# Patient Record
Sex: FEMALE | Race: ASIAN
[De-identification: names, ages, dates, MRNs, and addresses within clinical notes are randomized per-mention and may not be internally consistent; named-entity substitution may affect disease eponyms.]

---

## 2020-08-16 ENCOUNTER — HOSPITAL ENCOUNTER (INPATIENT)
Dept: HOSPITAL 72 - EMR | Age: 85
LOS: 5 days | Discharge: NURSING, REHAB OR LONG-TERM CARE | DRG: 870 | End: 2020-08-21
Payer: COMMERCIAL

## 2020-08-16 VITALS — SYSTOLIC BLOOD PRESSURE: 85 MMHG | DIASTOLIC BLOOD PRESSURE: 58 MMHG

## 2020-08-16 VITALS — DIASTOLIC BLOOD PRESSURE: 60 MMHG | SYSTOLIC BLOOD PRESSURE: 82 MMHG

## 2020-08-16 VITALS — BODY MASS INDEX: 30.4 KG/M2 | HEIGHT: 61 IN | WEIGHT: 161 LBS

## 2020-08-16 VITALS — SYSTOLIC BLOOD PRESSURE: 90 MMHG | DIASTOLIC BLOOD PRESSURE: 54 MMHG

## 2020-08-16 VITALS — DIASTOLIC BLOOD PRESSURE: 48 MMHG | SYSTOLIC BLOOD PRESSURE: 97 MMHG

## 2020-08-16 VITALS — SYSTOLIC BLOOD PRESSURE: 111 MMHG | DIASTOLIC BLOOD PRESSURE: 90 MMHG

## 2020-08-16 VITALS — SYSTOLIC BLOOD PRESSURE: 70 MMHG | DIASTOLIC BLOOD PRESSURE: 34 MMHG

## 2020-08-16 VITALS — DIASTOLIC BLOOD PRESSURE: 51 MMHG | SYSTOLIC BLOOD PRESSURE: 91 MMHG

## 2020-08-16 DIAGNOSIS — I21.4: ICD-10-CM

## 2020-08-16 DIAGNOSIS — K64.8: ICD-10-CM

## 2020-08-16 DIAGNOSIS — A41.9: Primary | ICD-10-CM

## 2020-08-16 DIAGNOSIS — L89.322: ICD-10-CM

## 2020-08-16 DIAGNOSIS — E87.6: ICD-10-CM

## 2020-08-16 DIAGNOSIS — E87.1: ICD-10-CM

## 2020-08-16 DIAGNOSIS — Z43.1: ICD-10-CM

## 2020-08-16 DIAGNOSIS — L89.126: ICD-10-CM

## 2020-08-16 DIAGNOSIS — Z43.0: ICD-10-CM

## 2020-08-16 DIAGNOSIS — E11.9: ICD-10-CM

## 2020-08-16 DIAGNOSIS — K29.70: ICD-10-CM

## 2020-08-16 DIAGNOSIS — Z79.4: ICD-10-CM

## 2020-08-16 DIAGNOSIS — L89.326: ICD-10-CM

## 2020-08-16 DIAGNOSIS — R40.3: ICD-10-CM

## 2020-08-16 DIAGNOSIS — D64.9: ICD-10-CM

## 2020-08-16 DIAGNOSIS — Z99.11: ICD-10-CM

## 2020-08-16 DIAGNOSIS — I48.91: ICD-10-CM

## 2020-08-16 DIAGNOSIS — I69.198: ICD-10-CM

## 2020-08-16 DIAGNOSIS — E87.70: ICD-10-CM

## 2020-08-16 DIAGNOSIS — L89.526: ICD-10-CM

## 2020-08-16 DIAGNOSIS — R13.10: ICD-10-CM

## 2020-08-16 DIAGNOSIS — N39.0: ICD-10-CM

## 2020-08-16 DIAGNOSIS — N17.9: ICD-10-CM

## 2020-08-16 DIAGNOSIS — J18.9: ICD-10-CM

## 2020-08-16 DIAGNOSIS — L89.896: ICD-10-CM

## 2020-08-16 DIAGNOSIS — K92.2: ICD-10-CM
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
## 2020-08-16 DIAGNOSIS — R65.21: ICD-10-CM

## 2020-08-16 DIAGNOSIS — J96.10: ICD-10-CM

## 2020-08-16 DIAGNOSIS — K57.90: ICD-10-CM

## 2020-08-16 DIAGNOSIS — L89.156: ICD-10-CM

## 2020-08-16 LAB
ADD MANUAL DIFF: YES
ALBUMIN SERPL-MCNC: 1.8 G/DL (ref 3.4–5)
ALBUMIN/GLOB SERPL: 0.6 {RATIO} (ref 1–2.7)
ALP SERPL-CCNC: 87 U/L (ref 46–116)
ALT SERPL-CCNC: 46 U/L (ref 12–78)
ANION GAP SERPL CALC-SCNC: 6 MMOL/L (ref 5–15)
APPEARANCE UR: (no result)
APTT BLD: 25 SEC (ref 23–33)
APTT PPP: YELLOW S
AST SERPL-CCNC: 62 U/L (ref 15–37)
BILIRUB SERPL-MCNC: 0.5 MG/DL (ref 0.2–1)
BUN SERPL-MCNC: 112 MG/DL (ref 7–18)
CALCIUM SERPL-MCNC: 7.3 MG/DL (ref 8.5–10.1)
CHLORIDE SERPL-SCNC: 85 MMOL/L (ref 98–107)
CO2 SERPL-SCNC: 34 MMOL/L (ref 21–32)
CREAT SERPL-MCNC: 1.8 MG/DL (ref 0.55–1.3)
ERYTHROCYTE [DISTWIDTH] IN BLOOD BY AUTOMATED COUNT: 12.2 % (ref 11.6–14.8)
GLOBULIN SER-MCNC: 3 G/DL
GLUCOSE UR STRIP-MCNC: NEGATIVE MG/DL
HCT VFR BLD CALC: 18.3 % (ref 37–47)
HGB BLD-MCNC: 6 G/DL (ref 12–16)
INR PPP: 0.9 (ref 0.9–1.1)
KETONES UR QL STRIP: NEGATIVE
LEUKOCYTE ESTERASE UR QL STRIP: (no result)
MCV RBC AUTO: 91 FL (ref 80–99)
NITRITE UR QL STRIP: NEGATIVE
PH UR STRIP: 5 [PH] (ref 4.5–8)
PLATELET # BLD: 153 K/UL (ref 150–450)
POTASSIUM SERPL-SCNC: 3.7 MMOL/L (ref 3.5–5.1)
PROT UR QL STRIP: NEGATIVE
RBC # BLD AUTO: 2.01 M/UL (ref 4.2–5.4)
SODIUM SERPL-SCNC: 125 MMOL/L (ref 136–145)
SP GR UR STRIP: 1.01 (ref 1–1.03)
UROBILINOGEN UR-MCNC: NORMAL MG/DL (ref 0–1)
WBC # BLD AUTO: 9.5 K/UL (ref 4.8–10.8)

## 2020-08-16 PROCEDURE — 84443 ASSAY THYROID STIM HORMONE: CPT

## 2020-08-16 PROCEDURE — 83880 ASSAY OF NATRIURETIC PEPTIDE: CPT

## 2020-08-16 PROCEDURE — 86140 C-REACTIVE PROTEIN: CPT

## 2020-08-16 PROCEDURE — 85730 THROMBOPLASTIN TIME PARTIAL: CPT

## 2020-08-16 PROCEDURE — 86900 BLOOD TYPING SEROLOGIC ABO: CPT

## 2020-08-16 PROCEDURE — 80048 BASIC METABOLIC PNL TOTAL CA: CPT

## 2020-08-16 PROCEDURE — 84100 ASSAY OF PHOSPHORUS: CPT

## 2020-08-16 PROCEDURE — 94002 VENT MGMT INPAT INIT DAY: CPT

## 2020-08-16 PROCEDURE — 82533 TOTAL CORTISOL: CPT

## 2020-08-16 PROCEDURE — 83550 IRON BINDING TEST: CPT

## 2020-08-16 PROCEDURE — 83615 LACTATE (LD) (LDH) ENZYME: CPT

## 2020-08-16 PROCEDURE — 93970 EXTREMITY STUDY: CPT

## 2020-08-16 PROCEDURE — 82607 VITAMIN B-12: CPT

## 2020-08-16 PROCEDURE — 83735 ASSAY OF MAGNESIUM: CPT

## 2020-08-16 PROCEDURE — 74177 CT ABD & PELVIS W/CONTRAST: CPT

## 2020-08-16 PROCEDURE — 94003 VENT MGMT INPAT SUBQ DAY: CPT

## 2020-08-16 PROCEDURE — 81003 URINALYSIS AUTO W/O SCOPE: CPT

## 2020-08-16 PROCEDURE — 5A1955Z RESPIRATORY VENTILATION, GREATER THAN 96 CONSECUTIVE HOURS: ICD-10-PCS

## 2020-08-16 PROCEDURE — 85044 MANUAL RETICULOCYTE COUNT: CPT

## 2020-08-16 PROCEDURE — 30233N1 TRANSFUSION OF NONAUTOLOGOUS RED BLOOD CELLS INTO PERIPHERAL VEIN, PERCUTANEOUS APPROACH: ICD-10-PCS

## 2020-08-16 PROCEDURE — 86901 BLOOD TYPING SEROLOGIC RH(D): CPT

## 2020-08-16 PROCEDURE — 93005 ELECTROCARDIOGRAM TRACING: CPT

## 2020-08-16 PROCEDURE — 85025 COMPLETE CBC W/AUTO DIFF WBC: CPT

## 2020-08-16 PROCEDURE — 86850 RBC ANTIBODY SCREEN: CPT

## 2020-08-16 PROCEDURE — 80053 COMPREHEN METABOLIC PANEL: CPT

## 2020-08-16 PROCEDURE — 82728 ASSAY OF FERRITIN: CPT

## 2020-08-16 PROCEDURE — 94150 VITAL CAPACITY TEST: CPT

## 2020-08-16 PROCEDURE — 94664 DEMO&/EVAL PT USE INHALER: CPT

## 2020-08-16 PROCEDURE — 87081 CULTURE SCREEN ONLY: CPT

## 2020-08-16 PROCEDURE — 82270 OCCULT BLOOD FECES: CPT

## 2020-08-16 PROCEDURE — 87040 BLOOD CULTURE FOR BACTERIA: CPT

## 2020-08-16 PROCEDURE — 84550 ASSAY OF BLOOD/URIC ACID: CPT

## 2020-08-16 PROCEDURE — 99291 CRITICAL CARE FIRST HOUR: CPT

## 2020-08-16 PROCEDURE — 36415 COLL VENOUS BLD VENIPUNCTURE: CPT

## 2020-08-16 PROCEDURE — 85610 PROTHROMBIN TIME: CPT

## 2020-08-16 PROCEDURE — 87070 CULTURE OTHR SPECIMN AEROBIC: CPT

## 2020-08-16 PROCEDURE — 87324 CLOSTRIDIUM AG IA: CPT

## 2020-08-16 PROCEDURE — 87086 URINE CULTURE/COLONY COUNT: CPT

## 2020-08-16 PROCEDURE — 96361 HYDRATE IV INFUSION ADD-ON: CPT

## 2020-08-16 PROCEDURE — 83540 ASSAY OF IRON: CPT

## 2020-08-16 PROCEDURE — 83605 ASSAY OF LACTIC ACID: CPT

## 2020-08-16 PROCEDURE — 87181 SC STD AGAR DILUTION PER AGT: CPT

## 2020-08-16 PROCEDURE — 82746 ASSAY OF FOLIC ACID SERUM: CPT

## 2020-08-16 PROCEDURE — 86920 COMPATIBILITY TEST SPIN: CPT

## 2020-08-16 PROCEDURE — 84484 ASSAY OF TROPONIN QUANT: CPT

## 2020-08-16 PROCEDURE — 93306 TTE W/DOPPLER COMPLETE: CPT

## 2020-08-16 PROCEDURE — 85007 BL SMEAR W/DIFF WBC COUNT: CPT

## 2020-08-16 PROCEDURE — 96365 THER/PROPH/DIAG IV INF INIT: CPT

## 2020-08-16 PROCEDURE — 71045 X-RAY EXAM CHEST 1 VIEW: CPT

## 2020-08-16 RX ADMIN — HUMAN INSULIN SCH MLS/HR: 100 INJECTION, SOLUTION SUBCUTANEOUS at 17:24

## 2020-08-16 RX ADMIN — SUCRALFATE SCH GM: 1 TABLET ORAL at 21:40

## 2020-08-16 RX ADMIN — SUCRALFATE SCH GM: 1 TABLET ORAL at 17:23

## 2020-08-16 RX ADMIN — PANTOPRAZOLE SODIUM SCH MG: 40 INJECTION, POWDER, FOR SOLUTION INTRAVENOUS at 21:39

## 2020-08-16 NOTE — NUR
ED Nurse Note:



Pt from [facility] and was brought in by ambulance due to hypotension 
78/48, low hgb of 6.2 and low hct of 18.5. Pt is a trach vent dependent. Noted 
severe edema on bilateral arms. Pt on G tube and with indwelling leahy cath 
upon arrival. Opens eyes spontaneously but non verbal, unable to follow 
commands. No respiratory distress.

## 2020-08-16 NOTE — NUR
ED Nurse Note:

15 min monitoring completed; blood continues to infuse. patient asymptomatic; 
vitals stable to baseline.

## 2020-08-16 NOTE — NUR
Received pt on SIMV rate of 10  Fio2 35% PEEP +5 PS 12. Placed pt on AC 
10  35% +5 per Dr. Guaman. SpO2 100%. Pt has cuffed shiley XLT 8. 
Suction with scant to small thin clear/white secretion with no complications. 
Trach is patent and secure. Cuff pressure checked via MLT. Alarms are on and 
audible. Back up trach at bedside. Will continue to monitor.

## 2020-08-16 NOTE — CARDIAC ELECTROPHYSIOLOGY PN
Subjective


Subjective


[id number]





Objective





Last 24 Hour Vital Signs








  Date Time  Temp Pulse Resp B/P (MAP) Pulse Ox O2 Delivery O2 Flow Rate FiO2


 


8/16/20 15:57  105 16 97/48 (64) 100   


 


8/16/20 15:51 97.7 79 15 70/34 (46) 100   


 


8/16/20 14:56  128 16  100 Mechanical Ventilator  35


 


8/16/20 14:56  128 16     35


 


8/16/20 14:33      Mechanical Ventilator  


 


8/16/20 14:00 97.9 103 18 111/90 (97) 100   


 


8/16/20 13:56 97.5 123 17 90/54 100 Mechanical Ventilator  35


 


8/16/20 13:51 97.5 123 17 90/54 100 Mechanical Ventilator  35


 


8/16/20 13:30 97.9 120 17 85/58 100 Mechanical Ventilator  35


 


8/16/20 13:25  127 16     35


 


8/16/20 11:10  68 24     35


 


8/16/20 10:49 96.4 87 19 82/60 100 Mechanical Ventilator  


 


8/16/20 10:45        35


 


8/16/20 10:39 96.4 59 22 82/60 (67) 99 Trach Collar  











Laboratory Tests








Test


  8/16/20


10:50 8/16/20


12:00 8/16/20


13:30


 


Urine Color Yellow    


 


Urine Appearance


  Slightly


cloudy 


  


 


 


Urine pH 5 (4.5-8.0)    


 


Urine Specific Gravity


  1.010


(1.005-1.035) 


  


 


 


Urine Protein


  Negative


(NEGATIVE) 


  


 


 


Urine Glucose (UA)


  Negative


(NEGATIVE) 


  


 


 


Urine Ketones


  Negative


(NEGATIVE) 


  


 


 


Urine Blood


  3+ (NEGATIVE)


H 


  


 


 


Urine Nitrite


  Negative


(NEGATIVE) 


  


 


 


Urine Bilirubin


  Negative


(NEGATIVE) 


  


 


 


Urine Urobilinogen


  Normal MG/DL


(0.0-1.0) 


  


 


 


Urine Leukocyte Esterase


  2+ (NEGATIVE)


H 


  


 


 


Urine RBC


  15-20 /HPF (0


- 2)  H 


  


 


 


Urine WBC


  20-30 /HPF (0


- 2)  H 


  


 


 


Urine Squamous Epithelial


Cells Moderate /LPF


(NONE/OCC)  H 


  


 


 


Urine Bacteria


  Moderate /HPF


(NONE)  H 


  


 


 


Urine Yeast


  Many /HPF


(NONE)  H 


  


 


 


White Blood Count


  


  9.5 K/UL


(4.8-10.8) 


 


 


Red Blood Count


  


  2.01 M/UL


(4.20-5.40)  L 


 


 


Hemoglobin


  


  6.0 G/DL


(12.0-16.0)  *L 


 


 


Hematocrit


  


  18.3 %


(37.0-47.0)  L 


 


 


Mean Corpuscular Volume  91 FL (80-99)   


 


Mean Corpuscular Hemoglobin


  


  29.8 PG


(27.0-31.0) 


 


 


Mean Corpuscular Hemoglobin


Concent 


  32.7 G/DL


(32.0-36.0) 


 


 


Red Cell Distribution Width


  


  12.2 %


(11.6-14.8) 


 


 


Platelet Count


  


  153 K/UL


(150-450) 


 


 


Mean Platelet Volume


  


  9.3 FL


(6.5-10.1) 


 


 


Neutrophils (%) (Auto)


  


  % (45.0-75.0)


  


 


 


Lymphocytes (%) (Auto)


  


  % (20.0-45.0)


  


 


 


Monocytes (%) (Auto)   % (1.0-10.0)   


 


Eosinophils (%) (Auto)   % (0.0-3.0)   


 


Basophils (%) (Auto)   % (0.0-2.0)   


 


Differential Total Cells


Counted 


  100  


  


 


 


Neutrophils % (Manual)  80 % (45-75)  H 


 


Lymphocytes % (Manual)  11 % (20-45)  L 


 


Monocytes % (Manual)  6 % (1-10)   


 


Eosinophils % (Manual)  1 % (0-3)   


 


Basophils % (Manual)  1 % (0-2)   


 


Band Neutrophils  1 % (0-8)   


 


Platelet Estimate  Adequate   


 


Platelet Morphology  Normal   


 


Hypochromasia  4+   


 


Anisocytosis  1+   


 


Spherocytes  2+   


 


Prothrombin Time


  


  10.2 SEC


(9.30-11.50) 


 


 


Prothromb Time International


Ratio 


  0.9 (0.9-1.1)  


  


 


 


Activated Partial


Thromboplast Time 


  25 SEC (23-33)


  


 


 


Sodium Level


  


  125 MMOL/L


(136-145)  L 


 


 


Potassium Level


  


  3.7 MMOL/L


(3.5-5.1) 


 


 


Chloride Level


  


  85 MMOL/L


()  L 


 


 


Carbon Dioxide Level


  


  34 MMOL/L


(21-32)  H 


 


 


Anion Gap


  


  6 mmol/L


(5-15) 


 


 


Blood Urea Nitrogen


  


  112 mg/dL


(7-18)  H 


 


 


Creatinine


  


  1.8 MG/DL


(0.55-1.30)  H 


 


 


Estimat Glomerular Filtration


Rate 


  26.6 mL/min


(>60) 


 


 


Glucose Level


  


  127 MG/DL


()  H 


 


 


Lactic Acid Level


  


  3.20 mmol/L


(0.4-2.0)  H 2.60 mmol/L


(0.66-2.22)  H


 


Calcium Level


  


  7.3 MG/DL


(8.5-10.1)  L 


 


 


Total Bilirubin


  


  0.5 MG/DL


(0.2-1.0) 


 


 


Aspartate Amino Transf


(AST/SGOT) 


  62 U/L (15-37)


H 


 


 


Alanine Aminotransferase


(ALT/SGPT) 


  46 U/L (12-78)


  


 


 


Alkaline Phosphatase


  


  87 U/L


() 


 


 


Troponin I


  


  3.205 ng/mL


(0.000-0.056) 


 


 


Pro-B-Type Natriuretic Peptide


  


  69591 pg/mL


(0-125)  H 


 


 


Total Protein


  


  4.8 G/DL


(6.4-8.2)  L 


 


 


Albumin


  


  1.8 G/DL


(3.4-5.0)  L 


 


 


Globulin  3.0 g/dL   


 


Albumin/Globulin Ratio


  


  0.6 (1.0-2.7)


L 


 











Microbiology








 Date/Time


Source Procedure


Growth Status


 


 


 8/16/20 10:30


Nasopharynx SARS-CoV-2 RdRp Gene Assay - Final Complete


 


 8/16/20 12:00


Rectum  Received

















Antione Redding MD Aug 16, 2020 16:59

## 2020-08-16 NOTE — DIAGNOSTIC IMAGING REPORT
EXAM:

  XR Chest, 1 View

 

CLINICAL HISTORY:

  SOB

 

TECHNIQUE:

  Frontal view of the chest.

 

COMPARISON:

  None

 

FINDINGS:

  Hardware: Tracheostomy tube terminates in the region of the mid 

thoracic trachea.

 

  Lungs/pleura: Patchy opacities throughout the lungs.  Possible small 

pleural effusions.

 

  Heart/mediastinum: Mild enlargement of the cardiac silhouette.  

Atherosclerotic calcifications of the aorta.  Mitral annular 

calcifications.

 

  Soft tissues: Unremarkable.

 

  Bones: No acute fracture.  Degenerative changes of the shoulders and 

spine.

 

  Upper abdomen: Normal.

 

IMPRESSION:   

  Patchy opacities throughout the lungs which may represent pulmonary 

edema versus infectious/inflammatory process.  Possible small pleural 

effusions.

## 2020-08-16 NOTE — NUR
NURSE NOTES:

Received report from ROSALBA Vincent. Upon assessment pt is obtunded and unresponsive to verbal 
stimuli. Vent settings observed at S8, A/C 10, Vt 400, FiO2 35% and P5 saturating at 98%. 
Right femoral TLC noted to be patent and intact running D5W 1/2 NS @ 100cc. 20 [place] leahy 
draining well to gravity. Vital WNL and pt afebrile. G-tube site intact with 0 residual. No 
signs of bleeding noted. Bed in lowest and locked position. Bed alarm on and call light 
within reach. Will continue monitoring.

## 2020-08-16 NOTE — NUR
NURSE NOTES:

Made aware of no diet order. Per Jaylyn Arreguin pt on Glucerna 1.2 at 35 cc via g-tube. 
Will f/u with MD in AM.

## 2020-08-16 NOTE — EMERGENCY ROOM REPORT
History of Present Illness


General


Chief Complaint:  Abnormal labs, hypotension


Source:  Medical Record, EMS





Present Illness


HPI


Disclaimer: Please note that this report is being documented using DRAGON 

technology. This can lead to erroneous entry secondary to incorrect 

interpretation by the dictating instrument.





HPI: 87-year-old female with a history of GI bleed, intracranial bleed, trach 

and vent dependent presents for evaluation of hypotension and abnormal labs.  

The patient was being transported by private ambulance to hospital for anemia 

with reported hemoglobin of 6.2, and for acute kidney injury with elevated BUN/

creatinine on outpatient labs.  En route she became hypotensive with pressures 

in the 80s.  Ambulance rerouted to our facility as we were the closest.  She 

arrives still mildly hypotensive no acute distress.  She is trach and vent 

dependent.  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 

oriented x0 at baseline.  No respiratory distress.  Afebrile.  Tracheostomy was 

put in 5 days ago.  She was COVID negative at that time.


 


PMH: Anemia, intracranial hemorrhage, trach and vent dependent, GI bleed, 

hypertension, GERD


 


PSH: Trach.,  G-tube


 


Allergies: Reviewed


 


Social Hx: Cannot obtain


Allergies:  


Coded Allergies:  


     No Known Allergies (Unverified , 8/16/20)





Review of Systems


All Other Systems:  limited - Cannot obtain from patient





Physical Exam


 





General: Obtunded, no spontaneous movement.  No obvious distress


HEENT: NC/AT. 


Neck: Tracheostomy appears appropriately placed, no surrounding bleeding, 

erythema, purulent drainage.


Cardiovascular: RRR.  S1 and S2 normal.  No murmur appreciated


Resp: Normal work of breathing. No cough, wheezing or crackles appreciated


Abdomen: Abdomen is soft, nondistended.  Gastrostomy tube appears in 

appropriate position without surrounding signs of infection or trauma.


Skin: Intact.  No abrasions, laceration or rash over the exposed skin


MSK: Normal tone and bulk.  No spontaneous movement.


Neuro: Obtunded, GCS 3.





Procedures


Critical Care Time


Critical Care Time


Total critical care time: Approximately 45 minutes





Due to a high probability of clinically significant, life threatening 

deterioration, the patient required the highest level of preparedness to 

intervene emergently and I personally spent this critical care time directly 

and personally managing the patient. This critical care time included obtaining 

a history, examining the patient, pulse oximetry, ordering and reviewing studies

, ordering treatments, evaluating response to treatment and updating management 

plan as needed, frequent reassessment and discussion with other providers as 

well as arranging for ultimate disposition.  This critical to care time was 

performed to assess and manage the high probability of life-threatening 

deterioration that could result in multiorgan failure.  This critical care time 

is separate from the separately billable procedures and treating other patients.





Central Line


Central Line :  


   Consent:  Emergent


   Central Line Lumen:  triple


   Maximal Sterile Barrier Tech:  yes cap, yes mask, yes sterile gown, yes 

sterile gloves, yes large sterile sheet, yes hand hygiene, yes chlorhexidine 

prep


   No Max Barrier Tech Because:  emergency insertion


   Central Line Postion:  femoral (R)


   US Guided Line?:  Yes


   Vessel visualized with U/S:  Right Femoral Vein


   Ultrasound Findings:  Collapsible Vessel, Vessel Patent, Visualize vessel 

puncture


   Complications:  none


   Central Line Post Position:  sutured, good blood return


   Attempts:  One


   Patient Tolerated:  Well


   Complications:  None





Medical Decision Making


Diagnostic Impression:  


 Primary Impression:  


 Hypotension


 Additional Impressions:  


 Anemia


 NSTEMI (non-ST elevated myocardial infarction)


 XUAN (acute kidney injury)


 Hyponatremia


 UTI (urinary tract infection)


ER Course


Is an 87-year-old trach and vent dependent female with a history of 

intracranial and GI bleed presenting for anemia, hypotension and XUAN.  Tested 

negative for COVID-19 on 8/12.  No respiratory issues at this time.  Patient 

hypotensive on arrival though otherwise her vitals are within normal limits.  

No tachycardia, afebrile.  Unable to establish peripheral access.  A central 

line was placed in the right femoral under ultrasound guidance by me with no 

complications.  Receiving aggressive IV fluid resuscitation and will add 

vasopressor agents as indicated.  





1230:


Labs confirm XUAN.  Troponin returned elevated.  Aspirin withheld over 

possibility of bleed given her history and current anemia.  Patient arrived 

with Santizo catheter in place and urinalysis concerning for infection.  Chest x-

ray also shows bilateral hazy opacities either infectious versus fluid 

overload.  BNP elevated and therefore believe more signs of pulmonary 

congestion as opposed to infiltrate.  Patient is saturating 100% without signs 

of respiratory distress or fever at this time.  Blood cultures were sent and 

patient was given Zosyn for broad coverage.  Pressures improved.  Map 68.  She 

will be admitted to SDU to her PMD, Dr. Casillas.





Sepsis reevaluation:


I, Dr. Ramy Guaman, reevaluated the patient


MAP: 68


Heart rate: 124


Respiratory rate: 20


Initial Lactate: 3.4


Repeat Lactate: Pending


Pressors: None





Laboratory Tests








Test


  8/16/20


10:50 8/16/20


12:00


 


Urine Color Yellow   


 


Urine Appearance


  Slightly


cloudy 


 


 


Urine pH 5 (4.5-8.0)   


 


Urine Specific Gravity


  1.010


(1.005-1.035) 


 


 


Urine Protein


  Negative


(NEGATIVE) 


 


 


Urine Glucose (UA)


  Negative


(NEGATIVE) 


 


 


Urine Ketones


  Negative


(NEGATIVE) 


 


 


Urine Blood


  3+ (NEGATIVE)


H 


 


 


Urine Nitrite


  Negative


(NEGATIVE) 


 


 


Urine Bilirubin


  Negative


(NEGATIVE) 


 


 


Urine Urobilinogen


  Normal MG/DL


(0.0-1.0) 


 


 


Urine Leukocyte Esterase


  2+ (NEGATIVE)


H 


 


 


Urine RBC


  15-20 /HPF (0


- 2)  H 


 


 


Urine WBC


  20-30 /HPF (0


- 2)  H 


 


 


Urine Squamous Epithelial


Cells Moderate /LPF


(NONE/OCC)  H 


 


 


Urine Bacteria


  Moderate /HPF


(NONE)  H 


 


 


Urine Yeast


  Many /HPF


(NONE)  H 


 


 


White Blood Count


  


  9.5 K/UL


(4.8-10.8)


 


Red Blood Count


  


  2.01 M/UL


(4.20-5.40)  L


 


Hemoglobin


  


  6.0 G/DL


(12.0-16.0)  *L


 


Hematocrit


  


  18.3 %


(37.0-47.0)  L


 


Mean Corpuscular Volume  91 FL (80-99)  


 


Mean Corpuscular Hemoglobin


  


  29.8 PG


(27.0-31.0)


 


Mean Corpuscular Hemoglobin


Concent 


  32.7 G/DL


(32.0-36.0)


 


Red Cell Distribution Width


  


  12.2 %


(11.6-14.8)


 


Platelet Count


  


  153 K/UL


(150-450)


 


Mean Platelet Volume


  


  9.3 FL


(6.5-10.1)


 


Neutrophils (%) (Auto)


  


  % (45.0-75.0)


 


 


Lymphocytes (%) (Auto)


  


  % (20.0-45.0)


 


 


Monocytes (%) (Auto)   % (1.0-10.0)  


 


Eosinophils (%) (Auto)   % (0.0-3.0)  


 


Basophils (%) (Auto)   % (0.0-2.0)  


 


Neutrophils % (Manual)  Pending  


 


Lymphocytes % (Manual)  Pending  


 


Platelet Estimate  Pending  


 


Platelet Morphology  Pending  


 


Prothrombin Time  Pending  


 


Prothrombin Time INR  Pending  


 


Activated Partial


Thromboplast Time 


  Pending  


 


 


Sodium Level


  


  125 MMOL/L


(136-145)  L


 


Potassium Level


  


  3.7 MMOL/L


(3.5-5.1)


 


Chloride Level


  


  85 MMOL/L


()  L


 


Carbon Dioxide Level


  


  34 MMOL/L


(21-32)  H


 


Anion Gap


  


  6 mmol/L


(5-15)


 


Blood Urea Nitrogen


  


  112 mg/dL


(7-18)  H


 


Creatinine


  


  1.8 MG/DL


(0.55-1.30)  H


 


Estimated Glomerular


Filtration Rate 


  26.6 mL/min


(>60)


 


Glucose Level


  


  127 MG/DL


()  H


 


Lactic Acid Level  Pending  


 


Calcium Level


  


  7.3 MG/DL


(8.5-10.1)  L


 


Total Bilirubin


  


  0.5 MG/DL


(0.2-1.0)


 


Aspartate Amino Transferase


(AST) 


  62 U/L (15-37)


H


 


Alanine Aminotransferase (ALT)


  


  46 U/L (12-78)


 


 


Alkaline Phosphatase


  


  87 U/L


()


 


Troponin I


  


  3.205 ng/mL


(0.000-0.056)


 


Pro-B-Type Natriuretic Peptide


  


  97139 pg/mL


(0-125)  H


 


Total Protein


  


  4.8 G/DL


(6.4-8.2)  L


 


Albumin


  


  1.8 G/DL


(3.4-5.0)  L


 


Globulin  3.0 g/dL  


 


Albumin/Globulin Ratio


  


  0.6 (1.0-2.7)


L








Microbiology








 Date/Time


Source Procedure


Growth Status


 


 


 8/16/20 10:30


Nasopharynx SARS-CoV-2 RdRp Gene Assay - Final Complete








EKG Diagnostic Results


EKG Time:  10:57


Rate:  normal


Other Impression


Irregularly irregular rhythm.  Palpable second-degree block versus atrial 

fibrillation.  Difficult to discern.  No acute ST segment changes.





Rhythm Strip Diag. Results


Rhythm Strip Time:  10:57


EP Interpretation:  yes


Rate:  60s


Rhythm:  other - Atrial fibrillation





Chest X-Ray Diagnostic Results


Chest X-Ray Diagnostic Results :  


   Chest X-Ray Ordered:  Yes


   # of Views/Limited/Complete:  1 View


   Indication:  Other - Tracheostomy


   EP Interpretation:  Yes


   Interpretation:  other - Bilateral vascular congestion versus infiltrate


   Impression:  Other - Infiltrate versus vascular congestion


   Electronically Signed by:  Electronically signed by Dr. Ramy Guaman


Disposition:  ADMITTED AS INPATIENT


Condition:  Serious











Ramy Guaman MD Aug 16, 2020 10:44

## 2020-08-16 NOTE — NUR
NURSE HAND-OFF REPORT: 



Important Events on Shift:Low BP, 2U blood given

Patient Status: Full Code

Diet: Pending



Pending Orders: Diet

Pending Results/Labs: Troponin

Pending MD notification:N/A



Latest Vital Signs: Temperature 97.7 , Pulse 82 , B/P 97 /48 , Respiratory Rate 15 , O2 SAT 
100 , Mechanical Ventilator, O2 Flow Rate .  

Vital Sign Comment: Stable



EKG Rhythm: Sinus Tachycardia

Rhythm change?: 

MD Notified?: -

MD Response: 



Latest Wilkinson Fall Score: 70  

Fall Risk: High Risk 

Safety Measures: Call light Within Reach, Bed Alarm Zone 3, Side Rails Side Rails x3, Bed 
position Low and Locked.

Fall Precautions: 

Yellow Socks

Door Sign

Patient Fall Education



Report given to ROSALBA Smith.

## 2020-08-16 NOTE — NUR
NURSE NOTES:

Second unit of blood started at 1551. Patient continues to have low BP 70/34 pulse of 79 on 
cardiac monitor.

## 2020-08-16 NOTE — NUR
ED Nurse Note:

blood transfusion initiated. verified with 2 rn vitals stable to baseline; 
documented on blood bank form

## 2020-08-16 NOTE — NUR
ED Nurse Note:

RIGHT FEMORAL TLC ETABLISHED BY DEEPIKA. BLOOD, LACTIC ACID, BLOOD CULTURE 
COLLLECTED; SENT DOWN TO LAB.

## 2020-08-16 NOTE — NUR
TRANSFER TO FLOOR:

Patient transferred to sdu [id number] as ordered, per jas walker.   Report given to 
grzegorz messer. endorsed blood transfusion. patient stable for transfer. 
[place] [facility] r[place] and rt

## 2020-08-17 VITALS — SYSTOLIC BLOOD PRESSURE: 96 MMHG | DIASTOLIC BLOOD PRESSURE: 61 MMHG

## 2020-08-17 VITALS — SYSTOLIC BLOOD PRESSURE: 111 MMHG | DIASTOLIC BLOOD PRESSURE: 55 MMHG

## 2020-08-17 VITALS — DIASTOLIC BLOOD PRESSURE: 76 MMHG | SYSTOLIC BLOOD PRESSURE: 103 MMHG

## 2020-08-17 VITALS — SYSTOLIC BLOOD PRESSURE: 100 MMHG | DIASTOLIC BLOOD PRESSURE: 52 MMHG

## 2020-08-17 VITALS — DIASTOLIC BLOOD PRESSURE: 57 MMHG | SYSTOLIC BLOOD PRESSURE: 121 MMHG

## 2020-08-17 VITALS — SYSTOLIC BLOOD PRESSURE: 119 MMHG | DIASTOLIC BLOOD PRESSURE: 62 MMHG

## 2020-08-17 LAB
% IRON SATURATION: 20 % (ref 15–50)
ADD MANUAL DIFF: NO
ALBUMIN SERPL-MCNC: 1.7 G/DL (ref 3.4–5)
ALBUMIN/GLOB SERPL: 0.6 {RATIO} (ref 1–2.7)
ALP SERPL-CCNC: 92 U/L (ref 46–116)
ALT SERPL-CCNC: 45 U/L (ref 12–78)
ANION GAP SERPL CALC-SCNC: 7 MMOL/L (ref 5–15)
AST SERPL-CCNC: 57 U/L (ref 15–37)
BILIRUB SERPL-MCNC: 0.6 MG/DL (ref 0.2–1)
BUN SERPL-MCNC: 103 MG/DL (ref 7–18)
CALCIUM SERPL-MCNC: 6.8 MG/DL (ref 8.5–10.1)
CHLORIDE SERPL-SCNC: 92 MMOL/L (ref 98–107)
CO2 SERPL-SCNC: 31 MMOL/L (ref 21–32)
CREAT SERPL-MCNC: 1.5 MG/DL (ref 0.55–1.3)
ERYTHROCYTE [DISTWIDTH] IN BLOOD BY AUTOMATED COUNT: 12.5 % (ref 11.6–14.8)
FERRITIN SERPL-MCNC: 1338 NG/ML (ref 8–388)
GLOBULIN SER-MCNC: 3 G/DL
HCT VFR BLD CALC: 25.5 % (ref 37–47)
HGB BLD-MCNC: 8.6 G/DL (ref 12–16)
IRON SERPL-MCNC: 23 UG/DL (ref 50–175)
LDH SERPL L TO P-CCNC: 462 U/L (ref 81–234)
MCV RBC AUTO: 87 FL (ref 80–99)
PHOSPHATE SERPL-MCNC: 4.5 MG/DL (ref 2.5–4.9)
PLATELET # BLD: 141 K/UL (ref 150–450)
POTASSIUM SERPL-SCNC: 3.1 MMOL/L (ref 3.5–5.1)
RBC # BLD AUTO: 2.92 M/UL (ref 4.2–5.4)
SODIUM SERPL-SCNC: 130 MMOL/L (ref 136–145)
TIBC SERPL-MCNC: 115 UG/DL (ref 250–450)
UNSATURATED IRON BINDING: 92 UG/DL (ref 112–346)
WBC # BLD AUTO: 8 K/UL (ref 4.8–10.8)

## 2020-08-17 RX ADMIN — DOCUSATE SODIUM SCH MG: 50 LIQUID ORAL at 17:54

## 2020-08-17 RX ADMIN — SUCRALFATE SCH GM: 1 TABLET ORAL at 13:19

## 2020-08-17 RX ADMIN — SUCRALFATE SCH GM: 1 TABLET ORAL at 20:38

## 2020-08-17 RX ADMIN — HUMAN INSULIN SCH MLS/HR: 100 INJECTION, SOLUTION SUBCUTANEOUS at 12:00

## 2020-08-17 RX ADMIN — PANTOPRAZOLE SODIUM SCH MG: 40 INJECTION, POWDER, FOR SOLUTION INTRAVENOUS at 20:38

## 2020-08-17 RX ADMIN — SUCRALFATE SCH GM: 1 TABLET ORAL at 09:00

## 2020-08-17 RX ADMIN — DEXTROSE MONOHYDRATE SCH MLS/HR: 50 INJECTION, SOLUTION INTRAVENOUS at 10:24

## 2020-08-17 RX ADMIN — HUMAN INSULIN SCH MLS/HR: 100 INJECTION, SOLUTION SUBCUTANEOUS at 02:47

## 2020-08-17 RX ADMIN — HUMAN INSULIN SCH MLS/HR: 100 INJECTION, SOLUTION SUBCUTANEOUS at 20:38

## 2020-08-17 RX ADMIN — DEXTROSE MONOHYDRATE SCH MLS/HR: 50 INJECTION, SOLUTION INTRAVENOUS at 16:58

## 2020-08-17 RX ADMIN — SUCRALFATE SCH GM: 1 TABLET ORAL at 17:54

## 2020-08-17 RX ADMIN — PANTOPRAZOLE SODIUM SCH MG: 40 INJECTION, POWDER, FOR SOLUTION INTRAVENOUS at 09:00

## 2020-08-17 RX ADMIN — ACETAMINOPHEN PRN MG: 160 SOLUTION ORAL at 20:38

## 2020-08-17 NOTE — CONSULTATION
Consult Note


Consult Note


I am asked to evaluate this patient at the request of Dr. Erazo covering 

for Dr. Casillas for renal failure.


Patient examined, chronic trach connected to vent 


data reviewed


Patient non-historian, 





Chief Complaint:  Abnormal labs, hypotension





HPI: 87-year-old female with a history of GI bleed, intracranial bleed, trach 

and vent dependent presents for evaluation of hypotension and abnormal labs.  

The patient was being transported by private ambulance to hospital for anemia 

with reported hemoglobin of 6.2, and for acute kidney injury with elevated BUN/

creatinine on outpatient labs.  En route she became hypotensive with pressures 

in the 80s.  Ambulance rerouted to our facility as we were the closest.  She 

arrives still mildly hypotensive no acute distress.  She is trach and vent 

dependent.  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 

oriented x0 at baseline.  No respiratory distress.  Afebrile.  Tracheostomy was 

put in 5 days ago.  She was COVID negative at that time.


 


PMH: Anemia, intracranial hemorrhage, trach and vent dependent, GI bleed, 

hypertension, GERD


 


PSH: Trach.,  G-tube


 


Social Hx: Cannot obtain


Allergies:  No Known Allergies (Unverified , 8/16/20)





Review of Systems


All Other Systems:  limited - Cannot obtain from patient











VITAL SIGNS:  Show blood pressure 111/85 , pulse 82, respirations 18, 

temperature 97.7.





General: Obtunded, no spontaneous movement.  No obvious distress


HEENT: NC/AT. 


Neck: Tracheostomy appears appropriately placed, no surrounding bleeding, 

erythema, purulent drainage.


Cardiovascular: RRR.  S1 and S2 normal.  No murmur appreciated


Resp: Normal work of breathing. No cough, wheezing or crackles appreciated


Abdomen: Abdomen is soft, nondistended.  Gastrostomy tube appears in 

appropriate position without surrounding signs of infection or trauma.


Skin: Intact.  No abrasions, laceration or rash over the exposed skin


MSK: Normal tone and bulk.  No spontaneous movement.


Neuro: Obtunded, GCS 3.





.


Assessment/Plan





Acute renal failure, mainly prerenal picture


Non-STEMI: Troponin 2.1


Chronic ventilatory dependent respiratory failure, history of intracranial bleed


Septic shock with lactic acidosis, possible UTI, possible pneumonia


Severe hyponatremia


Severe anemia, history of GI bleed


Hypertension


GERD











Suggestions:


Hydrate


Hold BP medications


IV Protonix, Carafate


Antibiotics per ID


Monitor renal parameters and electrolytes


Potassium supplement as needed


Transfusion as needed


Per consultants





 I spent an additional 36 minutes on review of medical records including prior 

hospital records,consult notes, progress notes, procedures ,imaging labs, 

hemodynamics, and other clinical documentation. 














Mando Garcia MD Aug 17, 2020 10:24

## 2020-08-17 NOTE — CONSULTATION
DATE OF CONSULTATION:  08/16/2020

CARDIOLOGY CONSULTATION



CONSULTING PHYSICIAN:  Antione Redding MD



REFERRING PHYSICIAN:  Felix Barriga MD



ADDITIONAL REFERRING PHYSICIAN:  Lilly Casillas MD



REASON FOR CONSULTATION:  Hypotension and non-ST elevation myocardial

infarction with troponin of more than 3.



HISTORY OF PRESENT ILLNESS:  The patient is a 87-year-old lady with history

of hypertension and ventilator-dependent respiratory failure status post

tracheostomy, dysphagia status post PEG placement, history of intracranial

bleed and GI bleed, was transferred by _____ anemia with hemoglobin of

6.2.  En route, the patient  became hypotensive with blood pressure

dropping to 70s and 80s.  The patient _____ facility which was the

closest.  The patient's tracheostomy was put in just 5 days ago and was

COVID negative at that time.  The patient also noted to have elevated

troponin of more than 3 and cardiac electrophysiology consultation was

obtained for further evaluation and management.



REVIEW OF SYSTEMS:  Cannot be obtained.



PAST MEDICAL HISTORY:  As mentioned above.



FAMILY HISTORY:  Noncontributory.



SOCIAL HISTORY:  Cannot be obtained.



ALLERGIES:  Reviewed.



PHYSICAL EXAMINATION:

VITAL SIGNS:  Show blood pressure 70/30 and currently 97/48, pulse 105,

respirations 18, temperature 97.7.

HEAD AND NECK:  Status post tracheostomy, which is fresh.

LUNGS:  Coarse rhonchi.

CARDIOVASCULAR:  Regular S1 and S2 with no gallop.

ABDOMEN:  Status post G-tube.

EXTREMITIES:  A 3+ pitting edema.



LABORATORY AND DIAGNOSTIC DATA:  White count of _____, hemoglobin of 6,

hematocrit of 18, and platelet count 153.  Sodium 125, potassium 3.7, BUN

of 112, creatinine of 1.8, and glucose of 127.  Troponin 3.25.  BNP is

19,000.



ASSESSMENT AND PLAN:

1. Non-ST elevation myocardial infarction with troponin of 3.2.  This

may be due to patient's demand ischemia in view hemoglobin of only 6 as

well as renal failure. Her EKG shows sinus rhythm with nonspecific ST-T

wave abnormality and no ST elevation.  In view of low blood pressure,

unable to put the patient on beta-blocker in view of history of GI bleed

and hemoglobin of 6 _____ aspirin.

2. Volume overload.  BNP of 19,000 as well as 4+ edema.  However, the

patient is likely intravascularly depleted as her BUN is 112 and

creatinine 1.8.  Further evaluation by Dr. Garcia.

3. Severe hyponatremia, sodium 125.

4. Septic shock with lactic acidosis.  The patient is on IV antibiotic

per ID.

5. 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

6. Dysphagia, status post PEG placement.

7. Acute renal failure.



The case was discussed with nurse at the bedside.  Echocardiogram is

also pending at the time of this dictation.



Thank you very much for allowing me to participate in the care of this

patient.  Please do not hesitate to contact me for any questions regarding

my evaluation.



Sincerely,









  ______________________________________________

  Antione Redding M.D.





DR:  Margareth

D:  08/16/2020 16:59

T:  08/17/2020 02:24

JOB#:  [id number]

CC:

## 2020-08-17 NOTE — NUR
RADIOLOGY DEPT., CHEST X-RAY DONE.-P.DYE Service to Anticoagulation received from Dr Urbina     Upon review of chart it appears that patient's Warfarin/INRs were previously managed by [facility] prior to hospitalization    Call placed to Med/Surg and spoke with Angel the nurse who is taking care of patient. She reports that patient resided at [facility] prior to hospitalization and will be discharged back to them. Will disregard referral.

## 2020-08-17 NOTE — CONSULTATION
History of Present Illness


General


Date patient seen:  Aug 17, 2020


Chief Complaint:  Abnormal Labs


Referring physician:  Dr Casillas


Reason for Consultation:  respiratory failure





Present Illness


HPI


87-year-old female with a history of GI bleed, intracranial bleed, trach and 

vent dependent presents for evaluation of hypotension and abnormal labs.  The 

patient was being transported by private ambulance to hospital for anemia with 

reported hemoglobin of 6.2, and for acute kidney injury with elevated BUN/

creatinine on outpatient labs.  En route she became hypotensive with pressures 

in the 80s.  Ambulance rerouted to our facility as we were the closest.  She 

arrives still mildly hypotensive no acute distress.  She is trach and vent 

dependent.  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 

oriented x0 at baseline. per report recent trach and surgery called toe valuate 

and assist with care.


Allergies:  


Coded Allergies:  


     No Known Allergies (Unverified , 8/16/20)





Medication History


Scheduled


Albuterol Sulfate* (Albuterol Sulfate Hfa*), 2 PUFF INH Q3H, (Reported)


Atorvastatin Calcium* (Lipitor*), 10 MG ORAL BEDTIME, (Reported)


Clopidogrel Bisulfate* (Plavix*), 75 MG ORAL DAILY, (Reported)


Diltiazem Hcl* (Cardizem*), 90 MG ORAL EVERY 6 HOURS, (Reported)


Docusate Sodium* (Colace*), 100 MG ORAL DAILY, (Reported)


Furosemide* (Lasix*), 20 MG ORAL DAILY, (Reported)


Insulin Regular, Human* (Novolin R*), 0 SUBQ .SLIDING SCALE, (Reported)


Magnesium Hydroxide* (Milk Of Magnesia*), 30 ML ORAL DAILY, (Reported)


Metoclopramide Hcl* (Reglan*), 5 MG ORAL EVERY 6 HOURS, (Reported)


Oxybutynin Chloride (Ditropan Xl), 5 MG ORAL DAILY, (Reported)


Pantoprazole* (Protonix*), 40 MG ORAL DAILY, (Reported)


Valsartan (Diovan), 80 MG ORAL DAILY, (Reported)





Scheduled PRN


Acetaminophen* (Acetaminophen 325MG Tablet*), 325 MG ORAL Q4H PRN for For Pain, 

(Reported)





Miscellaneous Medications


Chlorhexidine Gluconate* (Hibiclens*), 118 ML TP, (Reported)


Cholecalciferol (Vitamin D3) (Vitamin D3), 5,000 UNIT MC, (Reported)


Gel Base No.41 (Hydrogel), 3,000 GM MC, (Reported)


Iron,Carbonyl (Feosol), 325 MG PO, (Reported)


Silver Sulfadiazine (Silver Sulfadiazine), 50 GM TP, (Reported)





Patient History


Limited by:  medical condition


History Provided By:  Medical Record, PMD


Healthcare decision maker





Resuscitation status





Advanced Directive on File








Past Medical/Surgical History


Past Medical/Surgical History:  


(1) Sepsis


(2) Nosocomial pneumonia


(3) Hyponatremia


(4) UTI (urinary tract infection)


(5) Hypotension


(6) Chronic respiratory failure


(7) Anemia


(8) NSTEMI (non-ST elevated myocardial infarction)


(9) Feeding by G-tube


(10) XUAN (acute kidney injury)


(11) Chronic vegetative state


(12) ICH (intracerebral hemorrhage)





Review of Systems


All Other Systems:  negative except mentioned in HPI


ROS Narrative


cannot obtain given medical condition





Physical Exam


General Appearance:  mild distress


Lines, tubes and drains:  peripheral


HEENT:  mucous membranes moist


Neck:  trach


Respiratory/Chest:  on vent


Cardiovascular/Chest:  normal peripheral pulses, normal rate, regular rhythm


Abdomen:  soft, no organomegaly, no mass, feeding tube, other


Genitourinary/Rectal:  normal rectal exam


Extremities:  normal capillary refill, other


Skin Exam:  other


Neurologic:  unresponsiveness





Last 24 Hour Vital Signs








  Date Time  Temp Pulse Resp B/P (MAP) Pulse Ox O2 Delivery O2 Flow Rate FiO2


 


8/17/20 13:35  120 21     35


 


8/17/20 12:00 97.9 74 18 121/57 (78) 99   


 


8/17/20 12:00        35


 


8/17/20 12:00  65      


 


8/17/20 12:00      Mechanical Ventilator  


 


8/17/20 11:04  110 20     35


 


8/17/20 08:49  84 15     35


 


8/17/20 08:00  85      


 


8/17/20 08:00        35


 


8/17/20 08:00      Mechanical Ventilator  


 


8/17/20 08:00 98.6 82 18 111/55 (73) 99   


 


8/17/20 07:17  80 13     35


 


8/17/20 05:22  100 16     35


 


8/17/20 04:00        35


 


8/17/20 04:00      Mechanical Ventilator  


 


8/17/20 04:00 98.8 90 18 96/61 (73) 100   


 


8/17/20 03:42  107      


 


8/17/20 03:30  89 14     35


 


8/17/20 01:14  113 17     35


 


8/17/20 00:00      Mechanical Ventilator  


 


8/17/20 00:00 98.2 108 18 103/76 (85) 100   


 


8/16/20 23:33  133      


 


8/16/20 23:25  110 16     35


 


8/16/20 21:07  102 12     35


 


8/16/20 20:00 99.7 79 14 91/51 (64) 100   


 


8/16/20 20:00      Mechanical Ventilator  


 


8/16/20 20:00        35


 


8/16/20 19:22  82      


 


8/16/20 19:20  89 17     35


 


8/16/20 17:21  82 15     35


 


8/16/20 16:00  92      


 


8/16/20 16:00        35


 


8/16/20 15:57  105 16 97/48 (64) 100   


 


8/16/20 15:51 97.7 79 15 70/34 (46) 100   


 


8/16/20 14:56  128 16  100 Mechanical Ventilator  35


 


8/16/20 14:56  128 16     35


 


8/16/20 14:33      Mechanical Ventilator  

















Intake and Output  


 


 8/16/20 8/17/20





 19:00 07:00


 


Intake Total 560 ml 741.6 ml


 


Output Total 101 ml 600 ml


 


Balance 459 ml 141.6 ml


 


  


 


Intake Free Water  20 ml


 


IV Total 60 ml 721.6 ml


 


Blood Product 500 ml 


 


Output Urine Total 101 ml 600 ml


 


# Voids 100 


 


# Bowel Movements 2 











Laboratory Tests








Test


  8/16/20


19:45 8/17/20


03:23


 


Troponin I


  2.111 ng/mL


(0.000-0.056) 1.928 ng/mL


(0.000-0.056)


 


White Blood Count


  


  8.0 K/UL


(4.8-10.8)


 


Red Blood Count


  


  2.92 M/UL


(4.20-5.40)  L


 


Hemoglobin


  


  8.6 G/DL


(12.0-16.0)  #L


 


Hematocrit


  


  25.5 %


(37.0-47.0)  #L


 


Mean Corpuscular Volume  87 FL (80-99)  


 


Mean Corpuscular Hemoglobin


  


  29.4 PG


(27.0-31.0)


 


Mean Corpuscular Hemoglobin


Concent 


  33.6 G/DL


(32.0-36.0)


 


Red Cell Distribution Width


  


  12.5 %


(11.6-14.8)


 


Platelet Count


  


  141 K/UL


(150-450)  L


 


Mean Platelet Volume


  


  8.0 FL


(6.5-10.1)


 


Neutrophils (%) (Auto)


  


  % (45.0-75.0)


 


 


Lymphocytes (%) (Auto)


  


  % (20.0-45.0)


 


 


Monocytes (%) (Auto)   % (1.0-10.0)  


 


Eosinophils (%) (Auto)   % (0.0-3.0)  


 


Basophils (%) (Auto)   % (0.0-2.0)  


 


Reticulocyte Count


  


  1.8 %


(0.5-2.0)


 


Sodium Level


  


  130 MMOL/L


(136-145)  L


 


Potassium Level


  


  3.1 MMOL/L


(3.5-5.1)  L


 


Chloride Level


  


  92 MMOL/L


()  L


 


Carbon Dioxide Level


  


  31 MMOL/L


(21-32)


 


Anion Gap


  


  7 mmol/L


(5-15)


 


Blood Urea Nitrogen


  


  103 mg/dL


(7-18)  H


 


Creatinine


  


  1.5 MG/DL


(0.55-1.30)  H


 


Estimat Glomerular Filtration


Rate 


  32.9 mL/min


(>60)


 


Glucose Level


  


  128 MG/DL


()  H


 


Uric Acid


  


  9.8 MG/DL


(2.6-7.2)  H


 


Calcium Level


  


  6.8 MG/DL


(8.5-10.1)  L


 


Phosphorus Level


  


  4.5 MG/DL


(2.5-4.9)


 


Magnesium Level


  


  2.2 MG/DL


(1.8-2.4)


 


Iron Level


  


  23 ug/dL


()  L


 


Total Iron Binding Capacity


  


  115 ug/dL


(250-450)  L


 


Percent Iron Saturation  20 % (15-50)  


 


Unsaturated Iron Binding


  


  92 ug/dL


(112-346)  L


 


Ferritin


  


  1338 NG/ML


(8-388)  H


 


Total Bilirubin


  


  0.6 MG/DL


(0.2-1.0)


 


Aspartate Amino Transf


(AST/SGOT) 


  57 U/L (15-37)


H


 


Alanine Aminotransferase


(ALT/SGPT) 


  45 U/L (12-78)


 


 


Alkaline Phosphatase


  


  92 U/L


()


 


Lactate Dehydrogenase


  


  462 U/L


()  H


 


C-Reactive Protein,


Quantitative 


  9.9 mg/dL


(0.00-0.90)  H


 


Pro-B-Type Natriuretic Peptide


  


  25274 pg/mL


(0-125)  H


 


Total Protein


  


  4.7 G/DL


(6.4-8.2)  L


 


Albumin


  


  1.7 G/DL


(3.4-5.0)  L


 


Globulin  3.0 g/dL  


 


Albumin/Globulin Ratio


  


  0.6 (1.0-2.7)


L


 


Folate


  


  15.5 NG/ML


(8.6-58.9)


 


Thyroid Stimulating Hormone


(TSH) 


  1.954 uiU/mL


(0.358-3.740)


 


Cortisol AM Sample  Pending  











Microbiology








 Date/Time


Source Procedure


Growth Status


 


 


 8/17/20 09:05


Nasopharynx SARS-CoV-2 RdRp Gene Assay - Final Complete








Height (Feet):  5


Height (Inches):  3.00


Weight (Pounds):  139


Medications





Current Medications








 Medications


  (Trade)  Dose


 Ordered  Sig/Lisa


 Route


 PRN Reason  Start Time


 Stop Time Status Last Admin


Dose Admin


 


 Dextrose/Sodium


 Chloride  1,000 ml @ 


 100 mls/hr  Q10H


 IV


   8/16/20 16:00


 9/15/20 15:59  8/17/20 02:47


 


 


 Docusate Sodium


  (Colace)  100 mg  TWICE A  DAY


 NG


   8/17/20 18:00


 9/16/20 17:59   


 


 


 Pantoprazole


  (Protonix)  40 mg  EVERY 12  HOURS


 IVP


   8/16/20 21:00


 9/15/20 20:59  8/17/20 09:00


 


 


 Piperacillin Sod/


 Tazobactam Sod


 3.375 gm/Sodium


 Chloride  110 ml @ 


 27.5 mls/hr  Q8H


 IVPB


   8/17/20 09:00


 8/24/20 08:59  8/17/20 10:24


 


 


 Polyethylene


 Glycol


  (Miralax)  17 gm  BEDTIME


 NG


   8/17/20 21:00


 9/16/20 20:59   


 


 


 Sucralfate


  (Carafate)  1 gm  FOUR TIMES A  DAY


 GT


   8/16/20 18:00


 11/14/20 17:59  8/17/20 13:19


 











Assessment/Plan


Problem List:  


(1) Sepsis


ICD Codes:  A41.9 - Sepsis, unspecified organism


SNOMED:  44524595


(2) Nosocomial pneumonia


ICD Codes:  J18.9 - Pneumonia, unspecified organism; Y95 - Nosocomial condition


SNOMED:  046348349


(3) Hyponatremia


ICD Codes:  E87.1 - Hypo-osmolality and hyponatremia


SNOMED:  11808840


(4) UTI (urinary tract infection)


ICD Codes:  N39.0 - Urinary tract infection, site not specified


SNOMED:  18045090


(5) Hypotension


ICD Codes:  I95.9 - Hypotension, unspecified


SNOMED:  49574014


(6) Chronic respiratory failure


Assessment & Plan:  87F ill appearing trach on vent


trach evaluated and 8f xlt noted


sutures in place causing tension and breakdown


sutures removed at bedside


trach stoma evaluated and with breakdown.  dressings applied


trach in place and functional


cont vent support


will monitor and follow with recs


thank you


ICD Codes:  J96.10 - Chronic respiratory failure, unspecified whether with 

hypoxia or hypercapnia


SNOMED:  23077360


(7) Anemia


ICD Codes:  D64.9 - Anemia, unspecified


SNOMED:  985766335


(8) NSTEMI (non-ST elevated myocardial infarction)


ICD Codes:  I21.4 - Non-ST elevation (NSTEMI) myocardial infarction


SNOMED:  07661674


(9) Feeding by G-tube


Assessment & Plan:  DAILY ESTIMATED NEEDS:


Needs based on wound, critical care 47.5kg abw 


25-30  kcals/kg 


6370-3962  total kcals


1.25-2  g protein/kg


59-95  g total protein 


25-30ml/kcal   mL/kg


8431-8128  total fluid mLs





NUTRITION DIAGNOSIS:


Swallowing difficulty r/t respiratory status as evidenced by pt is trach


and peg dep.





CURRENT TF: NPO for EGD  





ENTERAL NUTRITION RECOMMENDATIONS:


Osmolite 1.2 goal of 45ml/hr x24 hrs + Prosource 1 pack daily    to provide 

1080ml, 1296 kcal, 60g + 11g pro, 886ml free H2O  





- As medically able, start Osmolite low fiber TF @low rate 25ml/hr for 6 hrs.


- Advance as tolerated 10ml/hr q4-6 hrs to goal


- Flush per MD/ HOB over 30 degrees


- Add Prosource 1 pack daily to better meet est pro needs.








ADDITIONAL RECOMMENDATIONS:


1) F/up w/ WC eval, add KEVIN BID w/ TF order 


2) Per SNF : 4'8" (56 inches) and 142 lbs/64.55kg 


3) Monitor renal function, lytes; need for renal TF formula  


4) Monitor BG and need for carb control formula 


   -> rec accuchecks + niss


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


SNOMED:  896861210, 342847652, 144885515


(10) XUAN (acute kidney injury)


ICD Codes:  N17.9 - Acute kidney failure, unspecified


SNOMED:  36242407, 7943979


(11) Chronic vegetative state


ICD Codes:  R40.3 - Persistent vegetative state


SNOMED:  53284611


(12) ICH (intracerebral hemorrhage)


ICD Codes:  I61.9 - Nontraumatic intracerebral hemorrhage, unspecified


SNOMED:  626599388











Reymundo Driscoll Aug 17, 2020 14:38

## 2020-08-17 NOTE — NUR
NURSE NOTES:

Bright red blood noticed upon trach suctioning. EKG recorded displaying Afib with RVR. Dr. Redding notified.

## 2020-08-17 NOTE — NUR
NURSE NOTES:WOUND CARE NOTES:Pt presented on admission with Tracheostomy ,Generalized 
edemae, Multiple Pressure injuries.

DTPI that is de-capped L Scapula(L)3.4cmx (W)7.3cm. Base of wound is 90% slough 10% Loose 
purpuric base and edges. Surrounding non-blanching erythema.

Inferior L scapula ,but in close proximity is DTPI (L)1.9cm x (W)4.4cm. Base of wound is 
purpuric with Maroon borders. 

Within close proximity to both wounds #3 linear purpuric areas that fans from scapula area 
outward towards thoracic spine.

DTPI Sacrococcygeal to L Buttocks (L)7.4cm x (W)4.3cm.Base of wound is purpuric and 
indurated with surrounding non-blanching erythema.

Two Partial thickness pressure injuries noted to L buttocks(Proximal)2.5cm x (W)4.6cm. Base 
of wound is moist and viable.

                                                                              Surrounding 
Non-Blanching erythema.

                                                                              (Distal)1cm x 
(W)1cm. Base of wound is moist and viable.

                                                                              Surrounding 
Non-Blanching erythema.

Perineal area is erythematous and moist . A purpuric area that is fluctuant noted at 
Perineum.

Bilat lower ext are edematous. Pt noted to have a large serous filled Bulla R Heel(L)11.5cm 
x (W)11.5cm.Surrounding area of heel is firm and pink. A second smaller serous Bulla noted 
to dorso/lateral R foot (L)2cm x(W)2.2cm.Scattered small purpuric areas noted to heads of 
2nd ,3rd,4th metatarsals.

Multiple purpuric areas noted to distal/lateral L tibia and L foot.

Elongated Purpuric Area with Marginal erythema along edges noted to distal/Lateral L 
tibia(L)7cm x (W)1.8cm.

L Heel /L Foot is an irregular shaped Purpuric area with marginal erythema without 
fluctuance/induration (L)3cm x (W)9.5cm. 

L Lateral Malleolus DTPI(L)1.5cm x (W)1.9cm.. Base of injury is fluctuant, purpuric with 
Maroon borders.

DTPI distal/lateral L foot (L)1.5cm x (W)1.5cm. Base of injury is maroon with fluctuance.

L Heel is boggy with non-blanchable erythema.



Tx.Plan: Cleanse wound L Scapula with Saline. Apply Therahoney. Apply Cavilon Skin Barrier 
Periwound. Cover with Optifoam 

            Drsg every 3 days and prn.

            Apply Cavilon Skin Barrier to Purpuric areas L Scapula. Cover with Optifoam drsg 
every 3 days and prn.

            Apply Moisture Barrier Paste to Sacrum and L Buttocks. Cover each site with 
Optifoam drsgs. Change every 3 days

            and PRN.

            Apply Cavilon Skin Barrier Spray to Blisters R foot. Cover with ABD Pads and 
wrap with Kerlix every 3 days and prn.

            Apply Cavilon Barrier Spray To Purpuric areas Distal L tibia and L foot. Cover 
with ABD Pads and wrap with Kerlix 

            every 3 days and prn.

            Reposition at least every 2hors or as tolerated .

            Off-load heels with Pillow.

            Place Pillow between knees.

            APM/HEATHER Mattress.

## 2020-08-17 NOTE — NUR
NURSE NOTES:

Left message for Dr. Mir in regard to diet order and possible insulin with sliding scale 
orders. Dr. Barriga at bedside. Pt in stable condition.

## 2020-08-17 NOTE — NUR
NURSE HAND-OFF REPORT: 



Important Events on Shift: Diarrhea, blood culture positive gram positive cocci

Patient Status: stable

Diet: npo



Pending Orders: na

Pending Results/Labs: C.diff and OB stool collected

Pending MD notification:na



Latest Vital Signs: Temperature 98.0 , Pulse 116 , B/P 119 /62 , Respiratory Rate 20 , O2 
SAT 99 , Mechanical Ventilator, O2 Flow Rate .  

Vital Sign Comment: stable



EKG Rhythm: Atrial Fibrillation

Rhythm change?: N 

MD Notified?: N -

MD Response: 



Latest Wilkinson Fall Score: 70  

Fall Risk: High Risk 

Safety Measures: Call light Within Reach, Bed Alarm Zone 1, Side Rails Side Rails x3, Bed 
position Low and Locked.

Fall Precautions: 

Yellow Socks

Yellow Gown

Patient Fall Education



Report given to ROSALBA Yost.

## 2020-08-17 NOTE — NUR
NURSE NOTES:

Received report from ROSALBA Yost. Patient obtunded, A. fib with HR 80. Trach to vent 
Shiley 8 AC 10  Fio2 35% PEEP 5, NPO at this time, will follow up with MD. Patient on 
Santizo Catheter draining well to gravity at this time. Right femoral PICC TLC intact and 
patent. IVF running as prescribed rate D5W @ 100ml/h, endorsed MD made aware of K level. Bed 
in lowest position, side rails upx3, call light within reach, bed alarm on, Will continue to 
monitor.

## 2020-08-17 NOTE — NUR
NURSE NOTES:

99.9 temperature post PRN administration. 

Pt. HR fluctuating from 34-60 bpm. 

Dr. Casillas at nurses station with no new orders.

## 2020-08-17 NOTE — NUR
NURSE HAND-OFF REPORT: 



Important Events on Shift: 3.1 

Patient Status: Stable

Diet: Pending



Pending Orders: Y

Pending Results/Labs: N

Pending MD notification: Y



Latest Vital Signs: Temperature 98.8 , Pulse 80 , B/P 96 /61 , Respiratory Rate 13 , O2 SAT 
100 , Mechanical Ventilator, O2 Flow Rate .  

Vital Sign Comment: 



EKG Rhythm: Atrial Fibrillation

Rhythm change?: N 

MD Notified?: -

MD Response: 



Latest Wilkinson Fall Score: 70  

Fall Risk: High Risk 

Safety Measures: Call light Within Reach, Bed Alarm Zone 1, Side Rails Side Rails x3, Bed 
position Low and Locked.

Fall Precautions: 

Yellow Socks

Yellow Gown

Patient Fall Education



Report given to ROSALBA Moctezuma.

## 2020-08-17 NOTE — NUR
***INSURANCE***



[facility] 

SPOKE TO: NITO GARC[facility] FULLY DELEGATED

NO AUTH ON FILE YET

FAX CLINCIALS TO [facility]

## 2020-08-17 NOTE — DIAGNOSTIC IMAGING REPORT
Indication: Shortness of breath

 

Technique: One view of the chest

 

Comparison: 8/16/2020

 

Findings: The heart is enlarged. There there is bilateral interstitial and airspace

edema versus infiltrates, unchanged. There is evidence of pleural fluid bilaterally,

left greater than right. Tracheostomy remains. Findings are unchanged

 

Impression: 

 

 Unchanged, over one day, findings as above. Likely multifactorial COPD, CHF, clinically better  O2 requirements at baseline     Patient resting on cart, denies any needs. States pain is \"ok\" call light within reach.

## 2020-08-17 NOTE — NUR
RD ASSESSMENT & RECOMMENDATIONS

SEE CARE ACTIVITY FOR COMPLETE ASSESSMENT



DAILY ESTIMATED NEEDS:

Needs based on wound, critical care 47.5kg abw 

25-30  kcals/kg 

0356-1878  total kcals

1.25-2  g protein/kg

59-95  g total protein 

25-30ml/kcal   mL/kg

8270-0583  total fluid mLs



NUTRITION DIAGNOSIS:

Swallowing difficulty r/t respiratory status as evidenced by pt is trach

and peg dep.



CURRENT TF: NPO for EGD  



ENTERAL NUTRITION RECOMMENDATIONS:

Osmolite 1.2 goal of 45ml/hr x24 hrs + Prosource 1 pack daily    to provide 1080ml, 1296 
kcal, 60g + 11g pro, 886ml free H2O  



- As medically able, start Osmolite low fiber TF @low rate 25ml/hr for 6 hrs.

- Advance as tolerated 10ml/hr q4-6 hrs to goal

- Flush per MD/ HOB over 30 degrees

- Add Prosource 1 pack daily to better meet est pro needs.





ADDITIONAL RECOMMENDATIONS:

1) F/up w/ WC eval, add KEVIN BID w/ TF order 

2) Per SNF : 4'8" (56 inches) and 142 lbs/64.55kg 

3) Monitor renal function, lytes; need for renal TF formula  

4) Monitor BG and need for carb control formula 

   -> rec accuchecks + niss

## 2020-08-17 NOTE — CONSULTATION
History of Present Illness


General


Date patient seen:  Aug 17, 2020


Time patient seen:  10:15


Chief Complaint:  Abnormal Labs


Referring physician:  Dr. Barriga


Reason for Consultation:  R/o UTI, pna





Present Illness


HPI





86yo F non-verbal, vegetative state, from SNF, who p/w anemia to 6.0 and 

hypotension, UCx positive and CXR w/ possible pna, for which ID is consulted.





Pt is non-verbal. History obtained via chart review. Per ED note, pt p/w anemia

, XUAN on labs, hypotensive en route but NAD. Trach and vent dependent. Trach 

placed 5 days ago, COVID neg at that time.





PMH/PSH: GIB, ICH, trach and vent dependent, HTN, GERD


NKDA


Social hx: Resides at [facility]


Fam hx: Non-contributory


Allergies:  


Coded Allergies:  


     No Known Allergies (Unverified , 8/16/20)





Medication History


Scheduled


Albuterol Sulfate* (Albuterol Sulfate Hfa*), 2 PUFF INH Q3H, (Reported)


Atorvastatin Calcium* (Lipitor*), 10 MG ORAL BEDTIME, (Reported)


Clopidogrel Bisulfate* (Plavix*), 75 MG ORAL DAILY, (Reported)


Diltiazem Hcl* (Cardizem*), 90 MG ORAL EVERY 6 HOURS, (Reported)


Docusate Sodium* (Colace*), 100 MG ORAL DAILY, (Reported)


Furosemide* (Lasix*), 20 MG ORAL DAILY, (Reported)


Insulin Regular, Human* (Novolin R*), 0 SUBQ .SLIDING SCALE, (Reported)


Magnesium Hydroxide* (Milk Of Magnesia*), 30 ML ORAL DAILY, (Reported)


Metoclopramide Hcl* (Reglan*), 5 MG ORAL EVERY 6 HOURS, (Reported)


Oxybutynin Chloride (Ditropan Xl), 5 MG ORAL DAILY, (Reported)


Pantoprazole* (Protonix*), 40 MG ORAL DAILY, (Reported)


Valsartan (Diovan), 80 MG ORAL DAILY, (Reported)





Scheduled PRN


Acetaminophen* (Acetaminophen 325MG Tablet*), 325 MG ORAL Q4H PRN for For Pain, 

(Reported)





Miscellaneous Medications


Chlorhexidine Gluconate* (Hibiclens*), 118 ML TP, (Reported)


Cholecalciferol (Vitamin D3) (Vitamin D3), 5,000 UNIT MC, (Reported)


Gel Base No.41 (Hydrogel), 3,000 GM MC, (Reported)


Iron,Carbonyl (Feosol), 325 MG PO, (Reported)


Silver Sulfadiazine (Silver Sulfadiazine), 50 GM TP, (Reported)





Patient History


Healthcare decision maker





Resuscitation status





Advanced Directive on File








Review of Systems


ROS Narrative





Unable to obtain 2/2 pt condition





Physical Exam


Physical Exam Narrative





Gen: Older woman, NAD on vent 35% Fi02


HEENT: OP clear, trach


CV: RRR


Pulm: CTAB anteriorly


Abd: Soft, NTND


Neuro: Not interactive





Last 24 Hour Vital Signs








  Date Time  Temp Pulse Resp B/P (MAP) Pulse Ox O2 Delivery O2 Flow Rate FiO2


 


8/17/20 07:17  80 13     35


 


8/17/20 05:22  100 16     35


 


8/17/20 04:00        35


 


8/17/20 04:00      Mechanical Ventilator  


 


8/17/20 04:00 98.8 90 18 96/61 (73) 100   


 


8/17/20 03:42  107      


 


8/17/20 03:30  89 14     35


 


8/17/20 01:14  113 17     35


 


8/17/20 00:00      Mechanical Ventilator  


 


8/17/20 00:00 98.2 108 18 103/76 (85) 100   


 


8/16/20 23:33  133      


 


8/16/20 23:25  110 16     35


 


8/16/20 21:07  102 12     35


 


8/16/20 20:00 99.7 79 14 91/51 (64) 100   


 


8/16/20 20:00      Mechanical Ventilator  


 


8/16/20 20:00        35


 


8/16/20 19:22  82      


 


8/16/20 19:20  89 17     35


 


8/16/20 17:21  82 15     35


 


8/16/20 16:00  92      


 


8/16/20 16:00        35


 


8/16/20 15:57  105 16 97/48 (64) 100   


 


8/16/20 15:51 97.7 79 15 70/34 (46) 100   


 


8/16/20 14:56  128 16  100 Mechanical Ventilator  35


 


8/16/20 14:56  128 16     35


 


8/16/20 14:33      Mechanical Ventilator  


 


8/16/20 14:00 97.9 103 18 111/90 (97) 100   


 


8/16/20 13:56 97.5 123 17 90/54 100 Mechanical Ventilator  35


 


8/16/20 13:51 97.5 123 17 90/54 100 Mechanical Ventilator  35


 


8/16/20 13:30 97.9 120 17 85/58 100 Mechanical Ventilator  35


 


8/16/20 13:25  127 16     35


 


8/16/20 11:10  68 24     35


 


8/16/20 10:49 96.4 87 19 82/60 100 Mechanical Ventilator  


 


8/16/20 10:45        35


 


8/16/20 10:39 96.4 59 22 82/60 (67) 99 Trach Collar  

















Intake and Output  


 


 8/16/20 8/17/20





 19:00 07:00


 


Intake Total 560 ml 741.6 ml


 


Output Total 101 ml 600 ml


 


Balance 459 ml 141.6 ml


 


  


 


Intake Free Water  20 ml


 


IV Total 60 ml 721.6 ml


 


Blood Product 500 ml 


 


Output Urine Total 101 ml 600 ml


 


# Voids 100 


 


# Bowel Movements 2 











Laboratory Tests








Test


  8/16/20


10:50 8/16/20


12:00 8/16/20


13:30 8/16/20


19:45


 


Urine Color Yellow     


 


Urine Appearance


  Slightly


cloudy 


  


  


 


 


Urine pH 5 (4.5-8.0)     


 


Urine Specific Gravity


  1.010


(1.005-1.035) 


  


  


 


 


Urine Protein


  Negative


(NEGATIVE) 


  


  


 


 


Urine Glucose (UA)


  Negative


(NEGATIVE) 


  


  


 


 


Urine Ketones


  Negative


(NEGATIVE) 


  


  


 


 


Urine Blood


  3+ (NEGATIVE)


H 


  


  


 


 


Urine Nitrite


  Negative


(NEGATIVE) 


  


  


 


 


Urine Bilirubin


  Negative


(NEGATIVE) 


  


  


 


 


Urine Urobilinogen


  Normal MG/DL


(0.0-1.0) 


  


  


 


 


Urine Leukocyte Esterase


  2+ (NEGATIVE)


H 


  


  


 


 


Urine RBC


  15-20 /HPF (0


- 2)  H 


  


  


 


 


Urine WBC


  20-30 /HPF (0


- 2)  H 


  


  


 


 


Urine Squamous Epithelial


Cells Moderate /LPF


(NONE/OCC)  H 


  


  


 


 


Urine Bacteria


  Moderate /HPF


(NONE)  H 


  


  


 


 


Urine Yeast


  Many /HPF


(NONE)  H 


  


  


 


 


White Blood Count


  


  9.5 K/UL


(4.8-10.8) 


  


 


 


Red Blood Count


  


  2.01 M/UL


(4.20-5.40)  L 


  


 


 


Hemoglobin


  


  6.0 G/DL


(12.0-16.0)  *L 


  


 


 


Hematocrit


  


  18.3 %


(37.0-47.0)  L 


  


 


 


Mean Corpuscular Volume  91 FL (80-99)    


 


Mean Corpuscular Hemoglobin


  


  29.8 PG


(27.0-31.0) 


  


 


 


Mean Corpuscular Hemoglobin


Concent 


  32.7 G/DL


(32.0-36.0) 


  


 


 


Red Cell Distribution Width


  


  12.2 %


(11.6-14.8) 


  


 


 


Platelet Count


  


  153 K/UL


(150-450) 


  


 


 


Mean Platelet Volume


  


  9.3 FL


(6.5-10.1) 


  


 


 


Neutrophils (%) (Auto)


  


  % (45.0-75.0)


  


  


 


 


Lymphocytes (%) (Auto)


  


  % (20.0-45.0)


  


  


 


 


Monocytes (%) (Auto)   % (1.0-10.0)    


 


Eosinophils (%) (Auto)   % (0.0-3.0)    


 


Basophils (%) (Auto)   % (0.0-2.0)    


 


Differential Total Cells


Counted 


  100  


  


  


 


 


Neutrophils % (Manual)  80 % (45-75)  H  


 


Lymphocytes % (Manual)  11 % (20-45)  L  


 


Monocytes % (Manual)  6 % (1-10)    


 


Eosinophils % (Manual)  1 % (0-3)    


 


Basophils % (Manual)  1 % (0-2)    


 


Band Neutrophils  1 % (0-8)    


 


Platelet Estimate  Adequate    


 


Platelet Morphology  Normal    


 


Hypochromasia  4+    


 


Anisocytosis  1+    


 


Spherocytes  2+    


 


Prothrombin Time


  


  10.2 SEC


(9.30-11.50) 


  


 


 


Prothromb Time International


Ratio 


  0.9 (0.9-1.1)  


  


  


 


 


Activated Partial


Thromboplast Time 


  25 SEC (23-33)


  


  


 


 


Sodium Level


  


  125 MMOL/L


(136-145)  L 


  


 


 


Potassium Level


  


  3.7 MMOL/L


(3.5-5.1) 


  


 


 


Chloride Level


  


  85 MMOL/L


()  L 


  


 


 


Carbon Dioxide Level


  


  34 MMOL/L


(21-32)  H 


  


 


 


Anion Gap


  


  6 mmol/L


(5-15) 


  


 


 


Blood Urea Nitrogen


  


  112 mg/dL


(7-18)  H 


  


 


 


Creatinine


  


  1.8 MG/DL


(0.55-1.30)  H 


  


 


 


Estimat Glomerular Filtration


Rate 


  26.6 mL/min


(>60) 


  


 


 


Glucose Level


  


  127 MG/DL


()  H 


  


 


 


Lactic Acid Level


  


  3.20 mmol/L


(0.4-2.0)  H 2.60 mmol/L


(0.66-2.22)  H 


 


 


Calcium Level


  


  7.3 MG/DL


(8.5-10.1)  L 


  


 


 


Total Bilirubin


  


  0.5 MG/DL


(0.2-1.0) 


  


 


 


Aspartate Amino Transf


(AST/SGOT) 


  62 U/L (15-37)


H 


  


 


 


Alanine Aminotransferase


(ALT/SGPT) 


  46 U/L (12-78)


  


  


 


 


Alkaline Phosphatase


  


  87 U/L


() 


  


 


 


Troponin I


  


  3.205 ng/mL


(0.000-0.056) 


  2.111 ng/mL


(0.000-0.056)


 


Pro-B-Type Natriuretic Peptide


  


  91307 pg/mL


(0-125)  H 


  


 


 


Total Protein


  


  4.8 G/DL


(6.4-8.2)  L 


  


 


 


Albumin


  


  1.8 G/DL


(3.4-5.0)  L 


  


 


 


Globulin  3.0 g/dL    


 


Albumin/Globulin Ratio


  


  0.6 (1.0-2.7)


L 


  


 


 


Test


  8/17/20


03:23 


  


  


 


 


White Blood Count


  8.0 K/UL


(4.8-10.8) 


  


  


 


 


Red Blood Count


  2.92 M/UL


(4.20-5.40)  L 


  


  


 


 


Hemoglobin


  8.6 G/DL


(12.0-16.0)  #L 


  


  


 


 


Hematocrit


  25.5 %


(37.0-47.0)  #L 


  


  


 


 


Mean Corpuscular Volume 87 FL (80-99)     


 


Mean Corpuscular Hemoglobin


  29.4 PG


(27.0-31.0) 


  


  


 


 


Mean Corpuscular Hemoglobin


Concent 33.6 G/DL


(32.0-36.0) 


  


  


 


 


Red Cell Distribution Width


  12.5 %


(11.6-14.8) 


  


  


 


 


Platelet Count


  141 K/UL


(150-450)  L 


  


  


 


 


Mean Platelet Volume


  8.0 FL


(6.5-10.1) 


  


  


 


 


Neutrophils (%) (Auto)


  % (45.0-75.0)


  


  


  


 


 


Lymphocytes (%) (Auto)


  % (20.0-45.0)


  


  


  


 


 


Monocytes (%) (Auto)  % (1.0-10.0)     


 


Eosinophils (%) (Auto)  % (0.0-3.0)     


 


Basophils (%) (Auto)  % (0.0-2.0)     


 


Sodium Level


  130 MMOL/L


(136-145)  L 


  


  


 


 


Potassium Level


  3.1 MMOL/L


(3.5-5.1)  L 


  


  


 


 


Chloride Level


  92 MMOL/L


()  L 


  


  


 


 


Carbon Dioxide Level


  31 MMOL/L


(21-32) 


  


  


 


 


Anion Gap


  7 mmol/L


(5-15) 


  


  


 


 


Blood Urea Nitrogen


  103 mg/dL


(7-18)  H 


  


  


 


 


Creatinine


  1.5 MG/DL


(0.55-1.30)  H 


  


  


 


 


Estimat Glomerular Filtration


Rate 32.9 mL/min


(>60) 


  


  


 


 


Glucose Level


  128 MG/DL


()  H 


  


  


 


 


Uric Acid


  9.8 MG/DL


(2.6-7.2)  H 


  


  


 


 


Calcium Level


  6.8 MG/DL


(8.5-10.1)  L 


  


  


 


 


Phosphorus Level


  4.5 MG/DL


(2.5-4.9) 


  


  


 


 


Magnesium Level


  2.2 MG/DL


(1.8-2.4) 


  


  


 


 


Iron Level Pending     


 


Unsaturated Iron Binding Pending     


 


Ferritin Pending     


 


Total Bilirubin


  0.6 MG/DL


(0.2-1.0) 


  


  


 


 


Aspartate Amino Transf


(AST/SGOT) 57 U/L (15-37)


H 


  


  


 


 


Alanine Aminotransferase


(ALT/SGPT) 45 U/L (12-78)


  


  


  


 


 


Alkaline Phosphatase


  92 U/L


() 


  


  


 


 


Lactate Dehydrogenase Pending     


 


Troponin I


  1.928 ng/mL


(0.000-0.056) 


  


  


 


 


C-Reactive Protein,


Quantitative 9.9 mg/dL


(0.00-0.90)  H 


  


  


 


 


Pro-B-Type Natriuretic Peptide


  69035 pg/mL


(0-125)  H 


  


  


 


 


Total Protein


  4.7 G/DL


(6.4-8.2)  L 


  


  


 


 


Albumin


  1.7 G/DL


(3.4-5.0)  L 


  


  


 


 


Globulin 3.0 g/dL     


 


Albumin/Globulin Ratio


  0.6 (1.0-2.7)


L 


  


  


 


 


Folate Pending     


 


Thyroid Stimulating Hormone


(TSH) Pending  


  


  


  


 


 


Cortisol AM Sample Pending     











Microbiology








 Date/Time


Source Procedure


Growth Status


 


 


 8/16/20 10:30


Nasopharynx SARS-CoV-2 RdRp Gene Assay - Final Complete


 


 8/16/20 10:50


Urine,Clean Catch Urine Culture - Preliminary


Gram Negative Bacillus 1 Resulted


 


 8/16/20 12:00


Rectum  Received








Height (Feet):  5


Height (Inches):  3.00


Weight (Pounds):  139


Medications





Current Medications








 Medications


  (Trade)  Dose


 Ordered  Sig/Lisa


 Route


 PRN Reason  Start Time


 Stop Time Status Last Admin


Dose Admin


 


 Dextrose/Sodium


 Chloride  1,000 ml @ 


 100 mls/hr  Q10H


 IV


   8/16/20 16:00


 9/15/20 15:59  8/17/20 02:47


 


 


 Pantoprazole


  (Protonix)  40 mg  DAILY


 IVP


   8/17/20 09:00


 9/16/20 08:59   


 


 


 Pantoprazole


  (Protonix)  40 mg  EVERY 12  HOURS


 IVP


   8/16/20 21:00


 9/15/20 20:59  8/16/20 21:39


 


 


 Potassium Chloride  100 ml @ 


 100 mls/hr  Q1HR


 IVPB


   8/17/20 08:00


 8/17/20 11:59 UNV  


 


 


 Sucralfate


  (Carafate)  1 gm  FOUR TIMES A  DAY


 GT


   8/16/20 18:00


 11/14/20 17:59  8/16/20 21:40


 











Assessment/Plan


Assessment/Plan:





86yo F with:





Hypotension in setting of anemia to 6.0


Normal WBC


Afebrile, Tmax 99.7


   8/16 BCx p


   MRSA nares p


Possible UTI


   8/16 UA+, UCx >100k GNR


Possible pna, vent dependent via trach


   8/16 CXR: Patchy opacities throughout the lungs which may represent 

pulmonary edema versus infectious/inflammatory process. Possible small pleural 

effusions.


   8/16 COVID rapid Ag neg x1


Volume OL? BNP 16,470


NSTEMI, troponin 2.1


XUAN on CKD, Cr 1.8, improving





Vent dependent, FiO2 35%


S/p trach 5 days pta


H/o GIB


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





PMH: GIB, ICH, trach and vent dependent, HTN, GERD





Plan:


Cont Zosyn #2 for UTI and possible pneumonia


Repeat COVID test again (neg x2 now)


F/u UCx, BCx


Trend XUAN





Monitor CBC/CMP


Monitor resp status


Monitor temp curve





D/w RN





Thank you for this consult. [facility] will continue to follow.











Vianney Feldman M.D. Aug 17, 2020 07:53

## 2020-08-17 NOTE — NUR
CASE MANAGEMENT: REVIEW



87 YEAR OLD FEMALE BIBA FROM [place] 





CC: ABNORMAL LABS 







SI: ANEMIA . XUAN . PNA 

T 96.4 HR 59 RR 22 BP 82/60 SAT 99% MECH VENT FIO2 35

H/H 6.0/18.3   CR 1.8 TROP 3.205 











IS: NS IVF BOLUS X1

ZOSYN IV X1

ALBUMIN IV X1

KCl 10MEQ IV X1



TRANSFUSE PRBC 2 UNITS 





***PATIENT ADMITTED TO STEP DOWN UNIT 08/16/2020***

DCP: PATIENT IS FROM [facility]

## 2020-08-17 NOTE — NUR
NURSE NOTES:

Report received from ROSALBA Moctezuma with update. Pt episode of fever 100.2 axil at start of shift. 
Cooling measures and PRN initiated. BP and O2 stat stable. No respiratory or cardiac 
distress noted. Made aware of episode of diarrhea today. Pending c-diff and OBS results. Pt. 
resuming NPO until further orders. Per Clarke, made aware of plan for EGD with clearance 
from cardio; pending hematology. Right femoral TLC stable and intact running D5 1/2 NS @ 
100. Bed kept in lowest and locked position. Bed alarm on. Will continue to monitor.

## 2020-08-17 NOTE — GENERAL PROGRESS NOTE
Assessment/Plan


Problem List:  


(1) Feeding by G-tube


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


SNOMED:  526623853, 257254231, 074818382


(2) Chronic respiratory failure


ICD Codes:  J96.10 - Chronic respiratory failure, unspecified whether with 

hypoxia or hypercapnia


SNOMED:  50799505


(3) Hyponatremia


ICD Codes:  E87.1 - Hypo-osmolality and hyponatremia


SNOMED:  10467901


(4) UTI (urinary tract infection)


ICD Codes:  N39.0 - Urinary tract infection, site not specified


SNOMED:  85950329


(5) Hypotension


ICD Codes:  I95.9 - Hypotension, unspecified


SNOMED:  23742606


(6) NSTEMI (non-ST elevated myocardial infarction)


ICD Codes:  I21.4 - Non-ST elevation (NSTEMI) myocardial infarction


SNOMED:  07180640


(7) Anemia


ICD Codes:  D64.9 - Anemia, unspecified


SNOMED:  184824298


(8) XUAN (acute kidney injury)


ICD Codes:  N17.9 - Acute kidney failure, unspecified


SNOMED:  19930997, 1889893


Assessment/Plan:


ppi


GTF on hold


abx per ID


monitor H&H


plan EGD if cleared by cardiology


stool ob





Subjective


ROS Limited/Unobtainable:  No


Allergies:  


Coded Allergies:  


     No Known Allergies (Unverified , 8/16/20)





Objective





Last 24 Hour Vital Signs








  Date Time  Temp Pulse Resp B/P (MAP) Pulse Ox O2 Delivery O2 Flow Rate FiO2


 


8/17/20 08:49  84 15     35


 


8/17/20 08:00 98.6 82 18 111/55 (73) 99   


 


8/17/20 07:17  80 13     35


 


8/17/20 05:22  100 16     35


 


8/17/20 04:00        35


 


8/17/20 04:00      Mechanical Ventilator  


 


8/17/20 04:00 98.8 90 18 96/61 (73) 100   


 


8/17/20 03:42  107      


 


8/17/20 03:30  89 14     35


 


8/17/20 01:14  113 17     35


 


8/17/20 00:00      Mechanical Ventilator  


 


8/17/20 00:00 98.2 108 18 103/76 (85) 100   


 


8/16/20 23:33  133      


 


8/16/20 23:25  110 16     35


 


8/16/20 21:07  102 12     35


 


8/16/20 20:00 99.7 79 14 91/51 (64) 100   


 


8/16/20 20:00      Mechanical Ventilator  


 


8/16/20 20:00        35


 


8/16/20 19:22  82      


 


8/16/20 19:20  89 17     35


 


8/16/20 17:21  82 15     35


 


8/16/20 16:00  92      


 


8/16/20 16:00        35


 


8/16/20 15:57  105 16 97/48 (64) 100   


 


8/16/20 15:51 97.7 79 15 70/34 (46) 100   


 


8/16/20 14:56  128 16  100 Mechanical Ventilator  35


 


8/16/20 14:56  128 16     35


 


8/16/20 14:33      Mechanical Ventilator  


 


8/16/20 14:00 97.9 103 18 111/90 (97) 100   


 


8/16/20 13:56 97.5 123 17 90/54 100 Mechanical Ventilator  35


 


8/16/20 13:51 97.5 123 17 90/54 100 Mechanical Ventilator  35


 


8/16/20 13:30 97.9 120 17 85/58 100 Mechanical Ventilator  35


 


8/16/20 13:25  127 16     35


 


8/16/20 11:10  68 24     35


 


8/16/20 10:49 96.4 87 19 82/60 100 Mechanical Ventilator  


 


8/16/20 10:45        35


 


8/16/20 10:39 96.4 59 22 82/60 (67) 99 Trach Collar  

















Intake and Output  


 


 8/16/20 8/17/20





 19:00 07:00


 


Intake Total 560 ml 741.6 ml


 


Output Total 101 ml 600 ml


 


Balance 459 ml 141.6 ml


 


  


 


Intake Free Water  20 ml


 


IV Total 60 ml 721.6 ml


 


Blood Product 500 ml 


 


Output Urine Total 101 ml 600 ml


 


# Voids 100 


 


# Bowel Movements 2 








Laboratory Tests


8/16/20 10:50: 


Urine Color Yellow, Urine Appearance Slightly cloudy, Urine pH 5, Urine 

Specific Gravity 1.010, Urine Protein Negative, Urine Glucose (UA) Negative, 

Urine Ketones Negative, Urine Blood 3+H, Urine Nitrite Negative, Urine 

Bilirubin Negative, Urine Urobilinogen Normal, Urine Leukocyte Esterase 2+H, 

Urine RBC 15-20H, Urine WBC 20-30H, Urine Squamous Epithelial Cells ModerateH, 

Urine Bacteria ModerateH, Urine Yeast ManyH


8/16/20 12:00: 


White Blood Count 9.5, Red Blood Count 2.01L, Hemoglobin 6.0*L, Hematocrit 18.3L

, Mean Corpuscular Volume 91, Mean Corpuscular Hemoglobin 29.8, Mean 

Corpuscular Hemoglobin Concent 32.7, Red Cell Distribution Width 12.2, Platelet 

Count 153, Mean Platelet Volume 9.3, Neutrophils (%) (Auto) , Lymphocytes (%) (

Auto) , Monocytes (%) (Auto) , Eosinophils (%) (Auto) , Basophils (%) (Auto) , 

Differential Total Cells Counted 100, Neutrophils % (Manual) 80H, Lymphocytes % 

(Manual) 11L, Monocytes % (Manual) 6, Eosinophils % (Manual) 1, Basophils % (

Manual) 1, Band Neutrophils 1, Platelet Estimate Adequate, Platelet Morphology 

Normal, Hypochromasia 4+, Anisocytosis 1+, Spherocytes 2+, Prothrombin Time 10.2

, Prothromb Time International Ratio 0.9, Activated Partial Thromboplast Time 25

, Sodium Level 125L, Potassium Level 3.7, Chloride Level 85L, Carbon Dioxide 

Level 34H, Anion Gap 6, Blood Urea Nitrogen 112H, Creatinine 1.8H, Estimat 

Glomerular Filtration Rate 26.6, Glucose Level 127H, Lactic Acid Level 3.20H, 

Calcium Level 7.3L, Total Bilirubin 0.5, Aspartate Amino Transf (AST/SGOT) 62H, 

Alanine Aminotransferase (ALT/SGPT) 46, Alkaline Phosphatase 87, Troponin I 

3.205H, Pro-B-Type Natriuretic Peptide 78060P, Total Protein 4.8L, Albumin 1.8L

, Globulin 3.0, Albumin/Globulin Ratio 0.6L


8/16/20 13:30: Lactic Acid Level 2.60H


8/16/20 19:45: Troponin I 2.111H


8/17/20 03:23: 


White Blood Count 8.0, Red Blood Count 2.92L, Hemoglobin 8.6#L, Hematocrit 25.5#

L, Mean Corpuscular Volume 87, Mean Corpuscular Hemoglobin 29.4, Mean 

Corpuscular Hemoglobin Concent 33.6, Red Cell Distribution Width 12.5, Platelet 

Count 141L, Mean Platelet Volume 8.0, Neutrophils (%) (Auto) , Lymphocytes (%) (

Auto) , Monocytes (%) (Auto) , Eosinophils (%) (Auto) , Basophils (%) (Auto) , 

Reticulocyte Count [Pending], Sodium Level 130L, Potassium Level 3.1L, Chloride 

Level 92L, Carbon Dioxide Level 31, Anion Gap 7, Blood Urea Nitrogen 103H, 

Creatinine 1.5H, Estimat Glomerular Filtration Rate 32.9, Glucose Level 128H, 

Uric Acid 9.8H, Calcium Level 6.8L, Phosphorus Level 4.5, Magnesium Level 2.2, 

Iron Level 23L, Total Iron Binding Capacity 115L, Percent Iron Saturation 20, 

Unsaturated Iron Binding 92L, Ferritin 1338H, Total Bilirubin 0.6, Aspartate 

Amino Transf (AST/SGOT) 57H, Alanine Aminotransferase (ALT/SGPT) 45, Alkaline 

Phosphatase 92, Lactate Dehydrogenase 462H, Troponin I 1.928H, C-Reactive 

Protein, Quantitative 9.9H, Pro-B-Type Natriuretic Peptide 50493J, Total 

Protein 4.7L, Albumin 1.7L, Globulin 3.0, Albumin/Globulin Ratio 0.6L, Folate 

15.5, Thyroid Stimulating Hormone (TSH) 1.954, Cortisol AM Sample [Pending]


Height (Feet):  5


Height (Inches):  3.00


Weight (Pounds):  139


General Appearance:  lethargic


EENT:  normal ENT inspection


Neck:  supple


Cardiovascular:  normal rate


Respiratory/Chest:  decreased breath sounds


Abdomen:  normal bowel sounds, non tender, soft


Extremities:  non-tender











George Mir MD Aug 17, 2020 09:43

## 2020-08-17 NOTE — CARDIAC ELECTROPHYSIOLOGY PN
Assessment/Plan


Assessment/Plan


1. Non-ST elevation myocardial infarction with troponin of 3.2, down to 2 and 

1.9. 


Due to demand ischemia in view hemoglobin of only 6 as


well as renal failure. Her EKG shows sinus rhythm with nonspecific ST-T


wave abnormality and no ST elevation. 





2. Volume overload.  BNP of 19,000 as well as 4+ edema.  However, the


patient is likely intravascularly depleted as her BUN is 112 and


creatinine 1.8.  Improved to 103/1.5. Further evaluation by Dr. Garcia.





3. Severe hyponatremia, sodium 125.





4. Septic shock with lactic acidosis and Hb 6.  Got PRBC and is on IV antibiotic


per [id number] to proceed with EGD in am.





5. 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


6. Dysphagia, status post PEG placement.


7. Acute renal failure.





Subjective


Subjective


 Remained in atrial fib in 70s.On 35% Fio2 on Vent via Trach. NPO.





Objective





Last 24 Hour Vital Signs








  Date Time  Temp Pulse Resp B/P (MAP) Pulse Ox O2 Delivery O2 Flow Rate FiO2


 


8/17/20 13:35  120 21     35


 


8/17/20 12:00 97.9 74 18 121/57 (78) 99   


 


8/17/20 12:00        35


 


8/17/20 12:00  65      


 


8/17/20 12:00      Mechanical Ventilator  


 


8/17/20 11:04  110 20     35


 


8/17/20 08:49  84 15     35


 


8/17/20 08:00  85      


 


8/17/20 08:00        35


 


8/17/20 08:00      Mechanical Ventilator  


 


8/17/20 08:00 98.6 82 18 111/55 (73) 99   


 


8/17/20 07:17  80 13     35


 


8/17/20 05:22  100 16     35


 


8/17/20 04:00        35


 


8/17/20 04:00      Mechanical Ventilator  


 


8/17/20 04:00 98.8 90 18 96/61 (73) 100   


 


8/17/20 03:42  107      


 


8/17/20 03:30  89 14     35


 


8/17/20 01:14  113 17     35


 


8/17/20 00:00      Mechanical Ventilator  


 


8/17/20 00:00 98.2 108 18 103/76 (85) 100   


 


8/16/20 23:33  133      


 


8/16/20 23:25  110 16     35


 


8/16/20 21:07  102 12     35


 


8/16/20 20:00 99.7 79 14 91/51 (64) 100   


 


8/16/20 20:00      Mechanical Ventilator  


 


8/16/20 20:00        35


 


8/16/20 19:22  82      


 


8/16/20 19:20  89 17     35


 


8/16/20 17:21  82 15     35


 


8/16/20 16:00  92      


 


8/16/20 16:00        35

















Intake and Output  


 


 8/16/20 8/17/20





 19:00 07:00


 


Intake Total 560 ml 741.6 ml


 


Output Total 101 ml 600 ml


 


Balance 459 ml 141.6 ml


 


  


 


Intake Free Water  20 ml


 


IV Total 60 ml 721.6 ml


 


Blood Product 500 ml 


 


Output Urine Total 101 ml 600 ml


 


# Voids 100 


 


# Bowel Movements 2 











Laboratory Tests








Test


  8/16/20


19:45 8/17/20


03:23 8/17/20


15:00


 


Troponin I


  2.111 ng/mL


(0.000-0.056) 1.928 ng/mL


(0.000-0.056) 


 


 


White Blood Count


  


  8.0 K/UL


(4.8-10.8) 


 


 


Red Blood Count


  


  2.92 M/UL


(4.20-5.40)  L 


 


 


Hemoglobin


  


  8.6 G/DL


(12.0-16.0)  #L 


 


 


Hematocrit


  


  25.5 %


(37.0-47.0)  #L 


 


 


Mean Corpuscular Volume  87 FL (80-99)   


 


Mean Corpuscular Hemoglobin


  


  29.4 PG


(27.0-31.0) 


 


 


Mean Corpuscular Hemoglobin


Concent 


  33.6 G/DL


(32.0-36.0) 


 


 


Red Cell Distribution Width


  


  12.5 %


(11.6-14.8) 


 


 


Platelet Count


  


  141 K/UL


(150-450)  L 


 


 


Mean Platelet Volume


  


  8.0 FL


(6.5-10.1) 


 


 


Neutrophils (%) (Auto)


  


  % (45.0-75.0)


  


 


 


Lymphocytes (%) (Auto)


  


  % (20.0-45.0)


  


 


 


Monocytes (%) (Auto)   % (1.0-10.0)   


 


Eosinophils (%) (Auto)   % (0.0-3.0)   


 


Basophils (%) (Auto)   % (0.0-2.0)   


 


Reticulocyte Count


  


  1.8 %


(0.5-2.0) 


 


 


Sodium Level


  


  130 MMOL/L


(136-145)  L 


 


 


Potassium Level


  


  3.1 MMOL/L


(3.5-5.1)  L 


 


 


Chloride Level


  


  92 MMOL/L


()  L 


 


 


Carbon Dioxide Level


  


  31 MMOL/L


(21-32) 


 


 


Anion Gap


  


  7 mmol/L


(5-15) 


 


 


Blood Urea Nitrogen


  


  103 mg/dL


(7-18)  H 


 


 


Creatinine


  


  1.5 MG/DL


(0.55-1.30)  H 


 


 


Estimat Glomerular Filtration


Rate 


  32.9 mL/min


(>60) 


 


 


Glucose Level


  


  128 MG/DL


()  H 


 


 


Uric Acid


  


  9.8 MG/DL


(2.6-7.2)  H 


 


 


Calcium Level


  


  6.8 MG/DL


(8.5-10.1)  L 


 


 


Phosphorus Level


  


  4.5 MG/DL


(2.5-4.9) 


 


 


Magnesium Level


  


  2.2 MG/DL


(1.8-2.4) 


 


 


Iron Level


  


  23 ug/dL


()  L 


 


 


Total Iron Binding Capacity


  


  115 ug/dL


(250-450)  L 


 


 


Percent Iron Saturation  20 % (15-50)   


 


Unsaturated Iron Binding


  


  92 ug/dL


(112-346)  L 


 


 


Ferritin


  


  1338 NG/ML


(8-388)  H 


 


 


Total Bilirubin


  


  0.6 MG/DL


(0.2-1.0) 


 


 


Aspartate Amino Transf


(AST/SGOT) 


  57 U/L (15-37)


H 


 


 


Alanine Aminotransferase


(ALT/SGPT) 


  45 U/L (12-78)


  


 


 


Alkaline Phosphatase


  


  92 U/L


() 


 


 


Lactate Dehydrogenase


  


  462 U/L


()  H 


 


 


C-Reactive Protein,


Quantitative 


  9.9 mg/dL


(0.00-0.90)  H 


 


 


Pro-B-Type Natriuretic Peptide


  


  92179 pg/mL


(0-125)  H 


 


 


Total Protein


  


  4.7 G/DL


(6.4-8.2)  L 


 


 


Albumin


  


  1.7 G/DL


(3.4-5.0)  L 


 


 


Globulin  3.0 g/dL   


 


Albumin/Globulin Ratio


  


  0.6 (1.0-2.7)


L 


 


 


Folate


  


  15.5 NG/ML


(8.6-58.9) 


 


 


Thyroid Stimulating Hormone


(TSH) 


  1.954 uiU/mL


(0.358-3.740) 


 


 


Cortisol AM Sample  Pending   


 


Stool Occult Blood   Pending  











Microbiology








 Date/Time


Source Procedure


Growth Status


 


 


 8/16/20 12:00


Blood Blood Culture - Preliminary Resulted


 


 8/17/20 09:05


Nasopharynx SARS-CoV-2 RdRp Gene Assay - Final Complete


 


 8/16/20 10:30


Nasopharynx SARS-CoV-2 RdRp Gene Assay - Final Complete


 


 8/16/20 10:50


Urine,Clean Catch Urine Culture - Preliminary


Gram Negative Bacillus 1 Resulted


 


 8/16/20 12:00


Rectum  Received








Objective





HEAD AND NECK:  Status post tracheostomy, which is fresh.


LUNGS:  Coarse rhonchi.


CARDIOVASCULAR:  Regular S1 and S2 with no gallop.


ABDOMEN:  Status post G-tube.


EXTREMITIES:   3+ pitting edema.











Antione Redding MD Aug 17, 2020 16:03

## 2020-08-17 NOTE — NUR
NURSE NOTES:

Dr. Mir made aware patient continuous diarrhea, smelly. C. diff and OB stool collected. 
Per MD discontinue laxatives. MD made aware per Dr. Miladis marin for EGD. Dr. Mir made 
aware of Cardiology stand point.

## 2020-08-17 NOTE — HISTORY & PHYSICAL
History of Present Illness


General


Reason for Hospitalization:  Abnormal Labs





Present Illness


Allergies:  


Coded Allergies:  


     No Known Allergies (Unverified , 8/16/20)





COVID-19 Screening


Contact w/high risk pt:  No


Experienced COVID-19 symptoms?:  No





Medication History


Scheduled


Albuterol Sulfate* (Albuterol Sulfate Hfa*), 2 PUFF INH Q3H, (Reported)


Atorvastatin Calcium* (Lipitor*), 10 MG ORAL BEDTIME, (Reported)


Clopidogrel Bisulfate* (Plavix*), 75 MG ORAL DAILY, (Reported)


Diltiazem Hcl* (Cardizem*), 90 MG ORAL EVERY 6 HOURS, (Reported)


Docusate Sodium* (Colace*), 100 MG ORAL DAILY, (Reported)


Furosemide* (Lasix*), 20 MG ORAL DAILY, (Reported)


Insulin Regular, Human* (Novolin R*), 0 SUBQ .SLIDING SCALE, (Reported)


Magnesium Hydroxide* (Milk Of Magnesia*), 30 ML ORAL DAILY, (Reported)


Metoclopramide Hcl* (Reglan*), 5 MG ORAL EVERY 6 HOURS, (Reported)


Oxybutynin Chloride (Ditropan Xl), 5 MG ORAL DAILY, (Reported)


Pantoprazole* (Protonix*), 40 MG ORAL DAILY, (Reported)


Valsartan (Diovan), 80 MG ORAL DAILY, (Reported)





Scheduled PRN


Acetaminophen* (Acetaminophen 325MG Tablet*), 325 MG ORAL Q4H PRN for For Pain, 

(Reported)





Miscellaneous Medications


Chlorhexidine Gluconate* (Hibiclens*), 118 ML TP, (Reported)


Cholecalciferol (Vitamin D3) (Vitamin D3), 5,000 UNIT MC, (Reported)


Gel Base No.41 (Hydrogel), 3,000 GM MC, (Reported)


Iron,Carbonyl (Feosol), 325 MG PO, (Reported)


Silver Sulfadiazine (Silver Sulfadiazine), 50 GM TP, (Reported)





Patient History


Healthcare decision maker





Resuscitation status





Advanced Directive on File








Review of Systems


Review of Symptoms


General ROS: no weight loss or fever


Psychological ROS: no depression or mood changes, no memory loss


Ophthalmic ROS: no visual changes or eye irritation


ENT ROS: no nasal congestion, hearing loss, dizziness


Allergy and Immunology ROS: no allergic symptoms or urticaria


Hematological and Lymphatic ROS: no swollen glands, unusual bleeding or bruising


Endocrine ROS: no polyuria, polydipsia, weight changes, temperature intolerance


Respiratory ROS: no cough, shortness of breath, or wheezing


Cardiovascular ROS: no chest pain or dyspnea on exertion


Gastrointestinal ROS: denies abdominal pain, bright red blood in stool.


Musculoskeletal ROS: no myalgias or arthralgias


Neurological ROS: no TIA or stroke symptoms


Dermatological ROS: no new or changing skin lesions, rashes or pruritis





Physical Exam


Physical Exam


General appearance:  alert, cooperative, no distress, appears stated age


Head:  Normocephalic, without obvious abnormality, atraumatic


Eyes:  conjunctivae/corneas clear. PERRL, EOM's intact. Fundi benign


Throat:  Lips, mucosa, and tongue normal. Teeth and gums normal


Neck:  supple, symmetrical, trachea midline, no adenopathy, thyroid: not 

enlarged, symmetric, no tenderness/mass/nodules, no carotid bruit and no JVD


Lungs:  clear to auscultation bilaterally


Heart:  regular rate and rhythm, S1, S2 normal, no murmur, click, rub or gallop


Abdomen:  soft, non-tender. Bowel sounds normal. No masses,  no organomegaly


Extremities:  extremities normal, atraumatic, no cyanosis or edema


Pulses:  2+ and symmetric


Skin:  Skin color, texture, turgor normal. No rashes or lesions


Neurologic:  Grossly normal





Last 24 Hour Vital Signs








  Date Time  Temp Pulse Resp B/P (MAP) Pulse Ox O2 Delivery O2 Flow Rate FiO2


 


8/17/20 05:22  100 16     35


 


8/17/20 04:00        35


 


8/17/20 04:00      Mechanical Ventilator  


 


8/17/20 04:00 98.8 90 18 96/61 (73) 100   


 


8/17/20 03:42  107      


 


8/17/20 03:30  89 14     35


 


8/17/20 01:14  113 17     35


 


8/17/20 00:00      Mechanical Ventilator  


 


8/17/20 00:00 98.2 108 18 103/76 (85) 100   


 


8/16/20 23:33  133      


 


8/16/20 23:25  110 16     35


 


8/16/20 21:07  102 12     35


 


8/16/20 20:00 99.7 79 14 91/51 (64) 100   


 


8/16/20 20:00      Mechanical Ventilator  


 


8/16/20 20:00        35


 


8/16/20 19:22  82      


 


8/16/20 19:20  89 17     35


 


8/16/20 17:21  82 15     35


 


8/16/20 16:00  92      


 


8/16/20 16:00        35


 


8/16/20 15:57  105 16 97/48 (64) 100   


 


8/16/20 15:51 97.7 79 15 70/34 (46) 100   


 


8/16/20 14:56  128 16  100 Mechanical Ventilator  35


 


8/16/20 14:56  128 16     35


 


8/16/20 14:33      Mechanical Ventilator  


 


8/16/20 14:00 97.9 103 18 111/90 (97) 100   


 


8/16/20 13:56 97.5 123 17 90/54 100 Mechanical Ventilator  35


 


8/16/20 13:51 97.5 123 17 90/54 100 Mechanical Ventilator  35


 


8/16/20 13:30 97.9 120 17 85/58 100 Mechanical Ventilator  35


 


8/16/20 13:25  127 16     35


 


8/16/20 11:10  68 24     35


 


8/16/20 10:49 96.4 87 19 82/60 100 Mechanical Ventilator  


 


8/16/20 10:45        35


 


8/16/20 10:39 96.4 59 22 82/60 (67) 99 Trach Collar  

















Intake and Output  


 


 8/16/20 8/17/20





 19:00 07:00


 


Intake Total 560 ml 641.6 ml


 


Output Total 101 ml 600 ml


 


Balance 459 ml 41.6 ml


 


  


 


Intake Free Water  20 ml


 


IV Total 60 ml 621.6 ml


 


Blood Product 500 ml 


 


Output Urine Total 101 ml 600 ml


 


# Voids 100 


 


# Bowel Movements 2 











Laboratory Tests








Test


  8/16/20


10:50 8/16/20


12:00 8/16/20


13:30 8/16/20


19:45


 


Urine Color Yellow     


 


Urine Appearance


  Slightly


cloudy 


  


  


 


 


Urine pH 5 (4.5-8.0)     


 


Urine Specific Gravity


  1.010


(1.005-1.035) 


  


  


 


 


Urine Protein


  Negative


(NEGATIVE) 


  


  


 


 


Urine Glucose (UA)


  Negative


(NEGATIVE) 


  


  


 


 


Urine Ketones


  Negative


(NEGATIVE) 


  


  


 


 


Urine Blood


  3+ (NEGATIVE)


H 


  


  


 


 


Urine Nitrite


  Negative


(NEGATIVE) 


  


  


 


 


Urine Bilirubin


  Negative


(NEGATIVE) 


  


  


 


 


Urine Urobilinogen


  Normal MG/DL


(0.0-1.0) 


  


  


 


 


Urine Leukocyte Esterase


  2+ (NEGATIVE)


H 


  


  


 


 


Urine RBC


  15-20 /HPF (0


- 2)  H 


  


  


 


 


Urine WBC


  20-30 /HPF (0


- 2)  H 


  


  


 


 


Urine Squamous Epithelial


Cells Moderate /LPF


(NONE/OCC)  H 


  


  


 


 


Urine Bacteria


  Moderate /HPF


(NONE)  H 


  


  


 


 


Urine Yeast


  Many /HPF


(NONE)  H 


  


  


 


 


White Blood Count


  


  9.5 K/UL


(4.8-10.8) 


  


 


 


Red Blood Count


  


  2.01 M/UL


(4.20-5.40)  L 


  


 


 


Hemoglobin


  


  6.0 G/DL


(12.0-16.0)  *L 


  


 


 


Hematocrit


  


  18.3 %


(37.0-47.0)  L 


  


 


 


Mean Corpuscular Volume  91 FL (80-99)    


 


Mean Corpuscular Hemoglobin


  


  29.8 PG


(27.0-31.0) 


  


 


 


Mean Corpuscular Hemoglobin


Concent 


  32.7 G/DL


(32.0-36.0) 


  


 


 


Red Cell Distribution Width


  


  12.2 %


(11.6-14.8) 


  


 


 


Platelet Count


  


  153 K/UL


(150-450) 


  


 


 


Mean Platelet Volume


  


  9.3 FL


(6.5-10.1) 


  


 


 


Neutrophils (%) (Auto)


  


  % (45.0-75.0)


  


  


 


 


Lymphocytes (%) (Auto)


  


  % (20.0-45.0)


  


  


 


 


Monocytes (%) (Auto)   % (1.0-10.0)    


 


Eosinophils (%) (Auto)   % (0.0-3.0)    


 


Basophils (%) (Auto)   % (0.0-2.0)    


 


Differential Total Cells


Counted 


  100  


  


  


 


 


Neutrophils % (Manual)  80 % (45-75)  H  


 


Lymphocytes % (Manual)  11 % (20-45)  L  


 


Monocytes % (Manual)  6 % (1-10)    


 


Eosinophils % (Manual)  1 % (0-3)    


 


Basophils % (Manual)  1 % (0-2)    


 


Band Neutrophils  1 % (0-8)    


 


Platelet Estimate  Adequate    


 


Platelet Morphology  Normal    


 


Hypochromasia  4+    


 


Anisocytosis  1+    


 


Spherocytes  2+    


 


Prothrombin Time


  


  10.2 SEC


(9.30-11.50) 


  


 


 


Prothromb Time International


Ratio 


  0.9 (0.9-1.1)  


  


  


 


 


Activated Partial


Thromboplast Time 


  25 SEC (23-33)


  


  


 


 


Sodium Level


  


  125 MMOL/L


(136-145)  L 


  


 


 


Potassium Level


  


  3.7 MMOL/L


(3.5-5.1) 


  


 


 


Chloride Level


  


  85 MMOL/L


()  L 


  


 


 


Carbon Dioxide Level


  


  34 MMOL/L


(21-32)  H 


  


 


 


Anion Gap


  


  6 mmol/L


(5-15) 


  


 


 


Blood Urea Nitrogen


  


  112 mg/dL


(7-18)  H 


  


 


 


Creatinine


  


  1.8 MG/DL


(0.55-1.30)  H 


  


 


 


Estimat Glomerular Filtration


Rate 


  26.6 mL/min


(>60) 


  


 


 


Glucose Level


  


  127 MG/DL


()  H 


  


 


 


Lactic Acid Level


  


  3.20 mmol/L


(0.4-2.0)  H 2.60 mmol/L


(0.66-2.22)  H 


 


 


Calcium Level


  


  7.3 MG/DL


(8.5-10.1)  L 


  


 


 


Total Bilirubin


  


  0.5 MG/DL


(0.2-1.0) 


  


 


 


Aspartate Amino Transf


(AST/SGOT) 


  62 U/L (15-37)


H 


  


 


 


Alanine Aminotransferase


(ALT/SGPT) 


  46 U/L (12-78)


  


  


 


 


Alkaline Phosphatase


  


  87 U/L


() 


  


 


 


Troponin I


  


  3.205 ng/mL


(0.000-0.056) 


  2.111 ng/mL


(0.000-0.056)


 


Pro-B-Type Natriuretic Peptide


  


  36275 pg/mL


(0-125)  H 


  


 


 


Total Protein


  


  4.8 G/DL


(6.4-8.2)  L 


  


 


 


Albumin


  


  1.8 G/DL


(3.4-5.0)  L 


  


 


 


Globulin  3.0 g/dL    


 


Albumin/Globulin Ratio


  


  0.6 (1.0-2.7)


L 


  


 


 


Test


  8/17/20


03:23 


  


  


 


 


White Blood Count


  8.0 K/UL


(4.8-10.8) 


  


  


 


 


Red Blood Count


  2.92 M/UL


(4.20-5.40)  L 


  


  


 


 


Hemoglobin


  8.6 G/DL


(12.0-16.0)  #L 


  


  


 


 


Hematocrit


  25.5 %


(37.0-47.0)  #L 


  


  


 


 


Mean Corpuscular Volume 87 FL (80-99)     


 


Mean Corpuscular Hemoglobin


  29.4 PG


(27.0-31.0) 


  


  


 


 


Mean Corpuscular Hemoglobin


Concent 33.6 G/DL


(32.0-36.0) 


  


  


 


 


Red Cell Distribution Width


  12.5 %


(11.6-14.8) 


  


  


 


 


Platelet Count


  141 K/UL


(150-450)  L 


  


  


 


 


Mean Platelet Volume


  8.0 FL


(6.5-10.1) 


  


  


 


 


Neutrophils (%) (Auto)


  % (45.0-75.0)


  


  


  


 


 


Lymphocytes (%) (Auto)


  % (20.0-45.0)


  


  


  


 


 


Monocytes (%) (Auto)  % (1.0-10.0)     


 


Eosinophils (%) (Auto)  % (0.0-3.0)     


 


Basophils (%) (Auto)  % (0.0-2.0)     


 


Sodium Level


  130 MMOL/L


(136-145)  L 


  


  


 


 


Potassium Level


  3.1 MMOL/L


(3.5-5.1)  L 


  


  


 


 


Chloride Level


  92 MMOL/L


()  L 


  


  


 


 


Carbon Dioxide Level


  31 MMOL/L


(21-32) 


  


  


 


 


Anion Gap


  7 mmol/L


(5-15) 


  


  


 


 


Blood Urea Nitrogen


  103 mg/dL


(7-18)  H 


  


  


 


 


Creatinine


  1.5 MG/DL


(0.55-1.30)  H 


  


  


 


 


Estimat Glomerular Filtration


Rate 32.9 mL/min


(>60) 


  


  


 


 


Glucose Level


  128 MG/DL


()  H 


  


  


 


 


Uric Acid


  9.8 MG/DL


(2.6-7.2)  H 


  


  


 


 


Calcium Level


  6.8 MG/DL


(8.5-10.1)  L 


  


  


 


 


Phosphorus Level


  4.5 MG/DL


(2.5-4.9) 


  


  


 


 


Magnesium Level


  2.2 MG/DL


(1.8-2.4) 


  


  


 


 


Total Bilirubin


  0.6 MG/DL


(0.2-1.0) 


  


  


 


 


Aspartate Amino Transf


(AST/SGOT) 57 U/L (15-37)


H 


  


  


 


 


Alanine Aminotransferase


(ALT/SGPT) 45 U/L (12-78)


  


  


  


 


 


Alkaline Phosphatase


  92 U/L


() 


  


  


 


 


Troponin I


  1.928 ng/mL


(0.000-0.056) 


  


  


 


 


C-Reactive Protein,


Quantitative 9.9 mg/dL


(0.00-0.90)  H 


  


  


 


 


Pro-B-Type Natriuretic Peptide


  94234 pg/mL


(0-125)  H 


  


  


 


 


Total Protein


  4.7 G/DL


(6.4-8.2)  L 


  


  


 


 


Albumin


  1.7 G/DL


(3.4-5.0)  L 


  


  


 


 


Globulin 3.0 g/dL     


 


Albumin/Globulin Ratio


  0.6 (1.0-2.7)


L 


  


  


 


 


Cortisol AM Sample Pending     











Microbiology








 Date/Time


Source Procedure


Growth Status


 


 


 8/16/20 10:30


Nasopharynx SARS-CoV-2 RdRp Gene Assay - Final Complete


 


 8/16/20 12:00


Rectum  Received








Height (Feet):  5


Height (Inches):  3.00


Weight (Pounds):  139


Medications





Current Medications








 Medications


  (Trade)  Dose


 Ordered  Sig/Lisa


 Route


 PRN Reason  Start Time


 Stop Time Status Last Admin


Dose Admin


 


 Dextrose/Sodium


 Chloride  1,000 ml @ 


 100 mls/hr  Q10H


 IV


   8/16/20 16:00


 9/15/20 15:59  8/17/20 02:47


 


 


 Pantoprazole


  (Protonix)  40 mg  DAILY


 IVP


   8/17/20 09:00


 9/16/20 08:59 UNV  


 


 


 Pantoprazole


  (Protonix)  40 mg  DAILY


 IVP


   8/17/20 09:00


 9/16/20 08:59 UNV  


 


 


 Pantoprazole


  (Protonix)  40 mg  EVERY 12  HOURS


 IVP


   8/16/20 21:00


 9/15/20 20:59  8/16/20 21:39


 


 


 Sucralfate


  (Carafate)  1 gm  FOUR TIMES A  DAY


 GT


   8/16/20 18:00


 11/14/20 17:59  8/16/20 21:40


 











Assessment/Plan


Assessment/Plan:


Internal Med H&P





Covering for Dr. Casillas


RFA: Anemia, Gi bleed, trop high





HPI: 


87-year-old female with a history of GI bleed, intracranial bleed, trach and 

vent dependent presents for evaluation of hypotension and abnormal labs.  The 

patient was being transported by private ambulance to hospital for anemia with 

reported hemoglobin of 6.2, and for acute kidney injury with elevated BUN/

creatinine on outpatient labs.  En route she became hypotensive with pressures 

in the 80s.  Ambulance rerouted to our facility as we were the closest.  She 

arrives still mildly hypotensive no acute distress.  She is trach and vent 

dependent.  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 

oriented x0 at baseline.  No respiratory distress.  Afebrile.  Tracheostomy was 

put in 5 days ago.  She was COVID negative at that time. Currently comfortable 

in bed labs noted.


 


PMH: Anemia, intracranial hemorrhage, trach and vent dependent, GI bleed, 

hypertension, GERD


 


PSH: Trach.,  G-tube


 


Allergies: Reviewed


 


Social Hx: Cannot obtain


Allergies:  


Coded Allergies:  


     No Known Allergies (Unverified , 8/16/20)





Review of Systems


All Other Systems:  limited - Cannot obtain from patient





PE


General: Obtunded, no spontaneous movement.  No obvious distress


HEENT: NC/AT. 


Neck: Tracheostomy appears appropriately placed


Cardiovascular: RRR.  S1 and S2 normal.  No murmur appreciated


Resp: Normal work of breathing. No cough


Abdomen: Abdomen is soft, nondistended.  Gastrostomy tube appears in 

appropriate position without surrounding signs of infection or trauma.


Skin: Intact.  No abrasions, laceration or rash over the exposed skin


MSK: Normal tone and bulk.


Neuro: Obtunded





Labs noted





Imaging reviewed





Assessment and Recs


# Anemia rule out underlying gi bleed


--> anemia panel has been ordered, esr, ferritin, tibc, occult


--> s/p transfusion prbc 8/16


--> gi service if h/h downtrends


--> triple lumen has been placed


# NSTEMI (non-ST elevated myocardial infarction) with trop elevation


-> on bb, off asa


--> seen by Dr. Redding


--> volume management as per cards/renal


--> trop downtrended


# XUAN (acute kidney injury)


--> cr 1.8-->1.5


# Hyponatremia/Hypokalemoa


--> per renal recs


# Respiratory failure s/p trach


-> Dr. Malagon recs


# PNA on cxr


--> as per id recs


# Dysphagia s/p peg





Appreciate consultant care, will follow





[facility]


Hospital declaration


  INPATIENT level of care is warranted for this patient because patient is a 89 

year old with *** who presents with suspicion of ***. I have a high level of 

concern because ***. Patient is at high risk for ***. Plan of care/treatment 

include ***. Patient care is expected to be greater than 2 midnights.  





  OBSERVATION level of care is warranted for this patient. Patient is a 89 year 

old with *** who presents with ***. Patient will be admitted for 1 midnight, 

but if additional night(s) is/are necessary, patient will be converted to 

inpatient status for the entire hospitalization





Disposition: Once the patient is stable to leave the hospital, I anticipate the 

patient will likely be discharged to the following environment:***





Estimated discharge date: ***





I spent 70 minutes on this patient's case, and *** minutes was dedicated to 

counseling and/or care coordination. 








MIPS ([employer] System) 


Applicable CPT: 69173, 78282





CHECK ALL THAT ARE MET:





  Measure #5 (CHF): All ages. Prescribe ACE/ARB upon discharge for patients 

with left ventricular systolic dysfunction. If not, the reason is clearly 

documented in the medical chart.





  Measure #8 (CHF): All ages. Prescribe a beta blocker upon discharge for 

patients with left ventricular systolic dysfunction. If not, the reason is 

clearly documented in the medical chart.





  Measure #47 Advance care plan or surrogate decision maker documented in the 

medical record. 





  Measure #130 The provider has documented, updated, or reviewed the patients 

current medication list and has documented it in the patients note.  





  Measure #374 (All): Send report to referring provider. 





  Measure #407(Sepsis due to MSSA bacteremia): Age 18+ Patient treated with a 

beta-lactam antibiotic (Nafcillin, Oxacillin or Cefazolin) as definitive 

therapy. 








MEDICAL COMPLEXITY


High complexity medical decision making (need 2/3 categories)





Problem - need 4 points


  Acute/new problem with new plan for workup (4 points, 1 max)


  Acute/new problem without additional workup (3 points, 1 max)


  Unstable chronic problem actively being managed (2 point each, 2 max)


  Stable chronic problem actively being managed (1 point each, 2 max)


  Self-limited/transient process (constipation, muscle ache, etc) (1 point each

, 2 max)


 





Data - need 4 points


  Reviewed labs/imaging studies (1 points, 2 max)


  Independent review of imaging (EKG, xrays, etc) (2 points, 2 max)


  Discussed case with consult/other MD/RN (2 points, 2 max)





High Risk - qualify if have one of the following:


  Severe exacerbation of acute problem, acute mental status change, IV narcotics

, monitoring drug levels (vancomycin, INR, tacrolimus etc)











Felix Barriga MD Aug 17, 2020 06:58

## 2020-08-17 NOTE — CONSULTATION
History of Present Illness


General


Date patient seen:  Aug 17, 2020


Chief Complaint:  Abnormal Labs


Referring physician:  Dr Casillas


Reason for Consultation:  respiratory failure





Present Illness


HPI


 87-year-old female with a history of Chronic respiratory failure, vegetative 

state,  intracranial bleed, trach and vent dependent presented  to ER  for 

evaluation of hypotension and abnormal All laboratory, microbiology and 

radiology results were reviewed.including  hemoglobin of 6.2 and  elevated BUN/

creatinine.  


Pt wad diagnosed to have sepsis and pneumonia and admitted for further 

management.


Allergies:  


Coded Allergies:  


     No Known Allergies (Unverified , 8/16/20)





Medication History


Scheduled


Albuterol Sulfate* (Albuterol Sulfate Hfa*), 2 PUFF INH Q3H, (Reported)


Atorvastatin Calcium* (Lipitor*), 10 MG ORAL BEDTIME, (Reported)


Clopidogrel Bisulfate* (Plavix*), 75 MG ORAL DAILY, (Reported)


Diltiazem Hcl* (Cardizem*), 90 MG ORAL EVERY 6 HOURS, (Reported)


Docusate Sodium* (Colace*), 100 MG ORAL DAILY, (Reported)


Furosemide* (Lasix*), 20 MG ORAL DAILY, (Reported)


Insulin Regular, Human* (Novolin R*), 0 SUBQ .SLIDING SCALE, (Reported)


Magnesium Hydroxide* (Milk Of Magnesia*), 30 ML ORAL DAILY, (Reported)


Metoclopramide Hcl* (Reglan*), 5 MG ORAL EVERY 6 HOURS, (Reported)


Oxybutynin Chloride (Ditropan Xl), 5 MG ORAL DAILY, (Reported)


Pantoprazole* (Protonix*), 40 MG ORAL DAILY, (Reported)


Valsartan (Diovan), 80 MG ORAL DAILY, (Reported)





Scheduled PRN


Acetaminophen* (Acetaminophen 325MG Tablet*), 325 MG ORAL Q4H PRN for For Pain, 

(Reported)





Miscellaneous Medications


Chlorhexidine Gluconate* (Hibiclens*), 118 ML TP, (Reported)


Cholecalciferol (Vitamin D3) (Vitamin D3), 5,000 UNIT MC, (Reported)


Gel Base No.41 (Hydrogel), 3,000 GM MC, (Reported)


Iron,Carbonyl (Feosol), 325 MG PO, (Reported)


Silver Sulfadiazine (Silver Sulfadiazine), 50 GM TP, (Reported)





Patient History


Healthcare decision maker





Resuscitation status





Advanced Directive on File








Past Medical/Surgical History


Past Medical/Surgical History:  


(1) Chronic respiratory failure


(2) Feeding by G-tube


(3) Chronic vegetative state


(4) ICH (intracerebral hemorrhage)





Review of Systems


Constitutional:  Reports: no symptoms


All Other Systems:  negative except mentioned in HPI





Physical Exam


General Appearance:  WD/WN, no apparent distress


Lines, tubes and drains:  peripheral


HEENT:  normocephalic, atraumatic


Respiratory/Chest:  chest wall non-tender, lungs clear


Cardiovascular/Chest:  normal peripheral pulses, normal rate


Abdomen:  normal bowel sounds, non tender


Genitourinary/Rectal:  normal genital exam


Extremities:  normal range of motion


Skin Exam:  normal pigmentation





Last 24 Hour Vital Signs








  Date Time  Temp Pulse Resp B/P (MAP) Pulse Ox O2 Delivery O2 Flow Rate FiO2


 


8/17/20 11:04  110 20     35


 


8/17/20 08:49  84 15     35


 


8/17/20 08:00  85      


 


8/17/20 08:00 98.6 82 18 111/55 (73) 99   


 


8/17/20 07:17  80 13     35


 


8/17/20 05:22  100 16     35


 


8/17/20 04:00        35


 


8/17/20 04:00      Mechanical Ventilator  


 


8/17/20 04:00 98.8 90 18 96/61 (73) 100   


 


8/17/20 03:42  107      


 


8/17/20 03:30  89 14     35


 


8/17/20 01:14  113 17     35


 


8/17/20 00:00      Mechanical Ventilator  


 


8/17/20 00:00 98.2 108 18 103/76 (85) 100   


 


8/16/20 23:33  133      


 


8/16/20 23:25  110 16     35


 


8/16/20 21:07  102 12     35


 


8/16/20 20:00 99.7 79 14 91/51 (64) 100   


 


8/16/20 20:00      Mechanical Ventilator  


 


8/16/20 20:00        35


 


8/16/20 19:22  82      


 


8/16/20 19:20  89 17     35


 


8/16/20 17:21  82 15     35


 


8/16/20 16:00  92      


 


8/16/20 16:00        35


 


8/16/20 15:57  105 16 97/48 (64) 100   


 


8/16/20 15:51 97.7 79 15 70/34 (46) 100   


 


8/16/20 14:56  128 16  100 Mechanical Ventilator  35


 


8/16/20 14:56  128 16     35


 


8/16/20 14:33      Mechanical Ventilator  


 


8/16/20 14:00 97.9 103 18 111/90 (97) 100   


 


8/16/20 13:56 97.5 123 17 90/54 100 Mechanical Ventilator  35


 


8/16/20 13:51 97.5 123 17 90/54 100 Mechanical Ventilator  35


 


8/16/20 13:30 97.9 120 17 85/58 100 Mechanical Ventilator  35


 


8/16/20 13:25  127 16     35


 


8/16/20 11:10  68 24     35

















Intake and Output  


 


 8/16/20 8/17/20





 19:00 07:00


 


Intake Total 560 ml 741.6 ml


 


Output Total 101 ml 600 ml


 


Balance 459 ml 141.6 ml


 


  


 


Intake Free Water  20 ml


 


IV Total 60 ml 721.6 ml


 


Blood Product 500 ml 


 


Output Urine Total 101 ml 600 ml


 


# Voids 100 


 


# Bowel Movements 2 











Laboratory Tests








Test


  8/16/20


12:00 8/16/20


13:30 8/16/20


19:45 8/17/20


03:23


 


White Blood Count


  9.5 K/UL


(4.8-10.8) 


  


  8.0 K/UL


(4.8-10.8)


 


Red Blood Count


  2.01 M/UL


(4.20-5.40)  L 


  


  2.92 M/UL


(4.20-5.40)  L


 


Hemoglobin


  6.0 G/DL


(12.0-16.0)  *L 


  


  8.6 G/DL


(12.0-16.0)  #L


 


Hematocrit


  18.3 %


(37.0-47.0)  L 


  


  25.5 %


(37.0-47.0)  #L


 


Mean Corpuscular Volume 91 FL (80-99)     87 FL (80-99)  


 


Mean Corpuscular Hemoglobin


  29.8 PG


(27.0-31.0) 


  


  29.4 PG


(27.0-31.0)


 


Mean Corpuscular Hemoglobin


Concent 32.7 G/DL


(32.0-36.0) 


  


  33.6 G/DL


(32.0-36.0)


 


Red Cell Distribution Width


  12.2 %


(11.6-14.8) 


  


  12.5 %


(11.6-14.8)


 


Platelet Count


  153 K/UL


(150-450) 


  


  141 K/UL


(150-450)  L


 


Mean Platelet Volume


  9.3 FL


(6.5-10.1) 


  


  8.0 FL


(6.5-10.1)


 


Neutrophils (%) (Auto)


  % (45.0-75.0)


  


  


  % (45.0-75.0)


 


 


Lymphocytes (%) (Auto)


  % (20.0-45.0)


  


  


  % (20.0-45.0)


 


 


Monocytes (%) (Auto)  % (1.0-10.0)      % (1.0-10.0)  


 


Eosinophils (%) (Auto)  % (0.0-3.0)      % (0.0-3.0)  


 


Basophils (%) (Auto)  % (0.0-2.0)      % (0.0-2.0)  


 


Differential Total Cells


Counted 100  


  


  


  


 


 


Neutrophils % (Manual) 80 % (45-75)  H   


 


Lymphocytes % (Manual) 11 % (20-45)  L   


 


Monocytes % (Manual) 6 % (1-10)     


 


Eosinophils % (Manual) 1 % (0-3)     


 


Basophils % (Manual) 1 % (0-2)     


 


Band Neutrophils 1 % (0-8)     


 


Platelet Estimate Adequate     


 


Platelet Morphology Normal     


 


Hypochromasia 4+     


 


Anisocytosis 1+     


 


Spherocytes 2+     


 


Prothrombin Time


  10.2 SEC


(9.30-11.50) 


  


  


 


 


Prothromb Time International


Ratio 0.9 (0.9-1.1)  


  


  


  


 


 


Activated Partial


Thromboplast Time 25 SEC (23-33)


  


  


  


 


 


Sodium Level


  125 MMOL/L


(136-145)  L 


  


  130 MMOL/L


(136-145)  L


 


Potassium Level


  3.7 MMOL/L


(3.5-5.1) 


  


  3.1 MMOL/L


(3.5-5.1)  L


 


Chloride Level


  85 MMOL/L


()  L 


  


  92 MMOL/L


()  L


 


Carbon Dioxide Level


  34 MMOL/L


(21-32)  H 


  


  31 MMOL/L


(21-32)


 


Anion Gap


  6 mmol/L


(5-15) 


  


  7 mmol/L


(5-15)


 


Blood Urea Nitrogen


  112 mg/dL


(7-18)  H 


  


  103 mg/dL


(7-18)  H


 


Creatinine


  1.8 MG/DL


(0.55-1.30)  H 


  


  1.5 MG/DL


(0.55-1.30)  H


 


Estimat Glomerular Filtration


Rate 26.6 mL/min


(>60) 


  


  32.9 mL/min


(>60)


 


Glucose Level


  127 MG/DL


()  H 


  


  128 MG/DL


()  H


 


Lactic Acid Level


  3.20 mmol/L


(0.4-2.0)  H 2.60 mmol/L


(0.66-2.22)  H 


  


 


 


Calcium Level


  7.3 MG/DL


(8.5-10.1)  L 


  


  6.8 MG/DL


(8.5-10.1)  L


 


Total Bilirubin


  0.5 MG/DL


(0.2-1.0) 


  


  0.6 MG/DL


(0.2-1.0)


 


Aspartate Amino Transf


(AST/SGOT) 62 U/L (15-37)


H 


  


  57 U/L (15-37)


H


 


Alanine Aminotransferase


(ALT/SGPT) 46 U/L (12-78)


  


  


  45 U/L (12-78)


 


 


Alkaline Phosphatase


  87 U/L


() 


  


  92 U/L


()


 


Troponin I


  3.205 ng/mL


(0.000-0.056) 


  2.111 ng/mL


(0.000-0.056) 1.928 ng/mL


(0.000-0.056)


 


Pro-B-Type Natriuretic Peptide


  61473 pg/mL


(0-125)  H 


  


  21369 pg/mL


(0-125)  H


 


Total Protein


  4.8 G/DL


(6.4-8.2)  L 


  


  4.7 G/DL


(6.4-8.2)  L


 


Albumin


  1.8 G/DL


(3.4-5.0)  L 


  


  1.7 G/DL


(3.4-5.0)  L


 


Globulin 3.0 g/dL     3.0 g/dL  


 


Albumin/Globulin Ratio


  0.6 (1.0-2.7)


L 


  


  0.6 (1.0-2.7)


L


 


Reticulocyte Count


  


  


  


  1.8 %


(0.5-2.0)


 


Uric Acid


  


  


  


  9.8 MG/DL


(2.6-7.2)  H


 


Phosphorus Level


  


  


  


  4.5 MG/DL


(2.5-4.9)


 


Magnesium Level


  


  


  


  2.2 MG/DL


(1.8-2.4)


 


Iron Level


  


  


  


  23 ug/dL


()  L


 


Total Iron Binding Capacity


  


  


  


  115 ug/dL


(250-450)  L


 


Percent Iron Saturation    20 % (15-50)  


 


Unsaturated Iron Binding


  


  


  


  92 ug/dL


(112-346)  L


 


Ferritin


  


  


  


  1338 NG/ML


(8-388)  H


 


Lactate Dehydrogenase


  


  


  


  462 U/L


()  H


 


C-Reactive Protein,


Quantitative 


  


  


  9.9 mg/dL


(0.00-0.90)  H


 


Folate


  


  


  


  15.5 NG/ML


(8.6-58.9)


 


Thyroid Stimulating Hormone


(TSH) 


  


  


  1.954 uiU/mL


(0.358-3.740)


 


Cortisol AM Sample    Pending  











Microbiology








 Date/Time


Source Procedure


Growth Status


 


 


 8/17/20 09:05


Nasopharynx SARS-CoV-2 RdRp Gene Assay - Final Complete


 


 8/16/20 12:00


Rectum  Received








Height (Feet):  5


Height (Inches):  3.00


Weight (Pounds):  139


Medications





Current Medications








 Medications


  (Trade)  Dose


 Ordered  Sig/Lisa


 Route


 PRN Reason  Start Time


 Stop Time Status Last Admin


Dose Admin


 


 Dextrose/Sodium


 Chloride  1,000 ml @ 


 100 mls/hr  Q10H


 IV


   8/16/20 16:00


 9/15/20 15:59  8/17/20 02:47


 


 


 Docusate Sodium


  (Colace)  100 mg  TWICE A  DAY


 NG


   8/17/20 18:00


 9/16/20 17:59   


 


 


 Pantoprazole


  (Protonix)  40 mg  EVERY 12  HOURS


 IVP


   8/16/20 21:00


 9/15/20 20:59  8/17/20 09:00


 


 


 Piperacillin Sod/


 Tazobactam Sod


 3.375 gm/Sodium


 Chloride  110 ml @ 


 27.5 mls/hr  Q8H


 IVPB


   8/17/20 09:00


 8/24/20 08:59  8/17/20 10:24


 


 


 Polyethylene


 Glycol


  (Miralax)  17 gm  BEDTIME


 NG


   8/17/20 21:00


 9/16/20 20:59   


 


 


 Potassium Chloride  100 ml @ 


 100 mls/hr  Q1H


 IVPB


   8/17/20 08:30


 8/17/20 12:29  8/17/20 10:24


 


 


 Sucralfate


  (Carafate)  1 gm  FOUR TIMES A  DAY


 GT


   8/16/20 18:00


 11/14/20 17:59  8/17/20 09:00


 











Assessment/Plan


Problem List:  


(1) Nosocomial pneumonia


ICD Codes:  J18.9 - Pneumonia, unspecified organism; Y95 - Nosocomial condition


SNOMED:  850382635


(2) Sepsis


ICD Codes:  A41.9 - Sepsis, unspecified organism


SNOMED:  62907327


(3) UTI (urinary tract infection)


ICD Codes:  N39.0 - Urinary tract infection, site not specified


SNOMED:  02244398


(4) Chronic respiratory failure


ICD Codes:  J96.10 - Chronic respiratory failure, unspecified whether with 

hypoxia or hypercapnia


SNOMED:  84748701


(5) XUAN (acute kidney injury)


ICD Codes:  N17.9 - Acute kidney failure, unspecified


SNOMED:  28809106, 2849221


(6) Chronic vegetative state


ICD Codes:  R40.3 - Persistent vegetative state


SNOMED:  19908641


(7) ICH (intracerebral hemorrhage)


ICD Codes:  I61.9 - Nontraumatic intracerebral hemorrhage, unspecified


SNOMED:  309973263


(8) Feeding by G-tube


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


SNOMED:  437368530, 512437386, 166146379


Respiratory:  adjust tidal volume, monitor respiratory rate, adjust FIO2, CXR


Cardiac:  continue pressors, continue to monitor HR/BP


Renal:  F/U I&O, keep IV fluid, check electrolytes


Infectious Disease:  check cultures, continue antibiotics


Gastrointestinal:  continue feedings/current rate


Endocrine:  monitor blood sugar, check HgA1C, continue sliding scale insulin


Hematologic:  monitor H/H, transfuse if hgb<8.5


Neurologic:  PRN Ativan, PRN Morphine, keep patient comfortable


Affect:  PRN ativan


Time Spent (Minutes):  40


Notes Reviewed:  internist, cardio, renal


Discussed with:  nurses, consultants, 











Shiela Causey MD Aug 17, 2020 11:09

## 2020-08-18 VITALS — DIASTOLIC BLOOD PRESSURE: 78 MMHG | SYSTOLIC BLOOD PRESSURE: 143 MMHG

## 2020-08-18 VITALS — SYSTOLIC BLOOD PRESSURE: 100 MMHG | DIASTOLIC BLOOD PRESSURE: 77 MMHG

## 2020-08-18 VITALS — DIASTOLIC BLOOD PRESSURE: 63 MMHG | SYSTOLIC BLOOD PRESSURE: 101 MMHG

## 2020-08-18 VITALS — DIASTOLIC BLOOD PRESSURE: 57 MMHG | SYSTOLIC BLOOD PRESSURE: 103 MMHG

## 2020-08-18 VITALS — DIASTOLIC BLOOD PRESSURE: 70 MMHG | SYSTOLIC BLOOD PRESSURE: 115 MMHG

## 2020-08-18 VITALS — DIASTOLIC BLOOD PRESSURE: 61 MMHG | SYSTOLIC BLOOD PRESSURE: 115 MMHG

## 2020-08-18 LAB
ADD MANUAL DIFF: NO
ALBUMIN SERPL-MCNC: 1.9 G/DL (ref 3.4–5)
ALBUMIN/GLOB SERPL: 0.7 {RATIO} (ref 1–2.7)
ALP SERPL-CCNC: 123 U/L (ref 46–116)
ALT SERPL-CCNC: 45 U/L (ref 12–78)
ANION GAP SERPL CALC-SCNC: 6 MMOL/L (ref 5–15)
AST SERPL-CCNC: 50 U/L (ref 15–37)
BILIRUB SERPL-MCNC: 0.8 MG/DL (ref 0.2–1)
BUN SERPL-MCNC: 83 MG/DL (ref 7–18)
CALCIUM SERPL-MCNC: 7.1 MG/DL (ref 8.5–10.1)
CHLORIDE SERPL-SCNC: 98 MMOL/L (ref 98–107)
CO2 SERPL-SCNC: 28 MMOL/L (ref 21–32)
CREAT SERPL-MCNC: 1.4 MG/DL (ref 0.55–1.3)
ERYTHROCYTE [DISTWIDTH] IN BLOOD BY AUTOMATED COUNT: 12.8 % (ref 11.6–14.8)
GLOBULIN SER-MCNC: 2.8 G/DL
HCT VFR BLD CALC: 23.1 % (ref 37–47)
HGB BLD-MCNC: 7.7 G/DL (ref 12–16)
MCV RBC AUTO: 89 FL (ref 80–99)
PHOSPHATE SERPL-MCNC: 3.5 MG/DL (ref 2.5–4.9)
PLATELET # BLD: 155 K/UL (ref 150–450)
POTASSIUM SERPL-SCNC: 3.2 MMOL/L (ref 3.5–5.1)
RBC # BLD AUTO: 2.61 M/UL (ref 4.2–5.4)
SODIUM SERPL-SCNC: 132 MMOL/L (ref 136–145)
WBC # BLD AUTO: 6.8 K/UL (ref 4.8–10.8)

## 2020-08-18 RX ADMIN — HUMAN INSULIN SCH MLS/HR: 100 INJECTION, SOLUTION SUBCUTANEOUS at 17:49

## 2020-08-18 RX ADMIN — PANTOPRAZOLE SODIUM SCH MG: 40 INJECTION, POWDER, FOR SOLUTION INTRAVENOUS at 09:28

## 2020-08-18 RX ADMIN — DOCUSATE SODIUM SCH MG: 50 LIQUID ORAL at 17:49

## 2020-08-18 RX ADMIN — SUCRALFATE SCH GM: 1 TABLET ORAL at 13:51

## 2020-08-18 RX ADMIN — DEXTROSE MONOHYDRATE SCH MLS/HR: 50 INJECTION, SOLUTION INTRAVENOUS at 20:24

## 2020-08-18 RX ADMIN — PANTOPRAZOLE SODIUM SCH MG: 40 INJECTION, POWDER, FOR SOLUTION INTRAVENOUS at 20:24

## 2020-08-18 RX ADMIN — MEROPENEM SCH MLS/HR: 500 INJECTION INTRAVENOUS at 13:50

## 2020-08-18 RX ADMIN — DEXTROSE MONOHYDRATE SCH MLS/HR: 50 INJECTION, SOLUTION INTRAVENOUS at 01:39

## 2020-08-18 RX ADMIN — DEXTROSE MONOHYDRATE SCH MLS/HR: 50 INJECTION, SOLUTION INTRAVENOUS at 09:38

## 2020-08-18 RX ADMIN — MEROPENEM SCH MLS/HR: 500 INJECTION INTRAVENOUS at 20:30

## 2020-08-18 RX ADMIN — SUCRALFATE SCH GM: 1 TABLET ORAL at 09:28

## 2020-08-18 RX ADMIN — SUCRALFATE SCH GM: 1 TABLET ORAL at 20:24

## 2020-08-18 RX ADMIN — DOCUSATE SODIUM SCH MG: 50 LIQUID ORAL at 09:00

## 2020-08-18 RX ADMIN — HUMAN INSULIN SCH MLS/HR: 100 INJECTION, SOLUTION SUBCUTANEOUS at 07:45

## 2020-08-18 RX ADMIN — SUCRALFATE SCH GM: 1 TABLET ORAL at 17:58

## 2020-08-18 RX ADMIN — DOCUSATE SODIUM SCH MG: 50 LIQUID ORAL at 09:28

## 2020-08-18 NOTE — NUR
*IMM NOTE*

Signature of patient or representative obtained Y/N? : 

Date/Time: 08/18/201303

Comments: 

S/W PATIENTS SON ALETHA TREVIZO,IN REGAURDS TO PATIENTS RIGHT IMPORATANT MESSAGE FROM 
MEDICARE.



Or 



If No Representative, Referred to :

## 2020-08-18 NOTE — PROGRESS NOTE
DATE:  08/18/2020

SUBJECTIVE:  Patient is awake, alert, afebrile, hemodynamically stable, but

avoids eye contact.



PHYSICAL EXAMINATION:

VITAL SIGNS:  Blood pressure 115/70, pulse is 104, respirations are 19,

temperature 98.0.

HEENT:  Eyes were normal.  ENT, mucous membranes moist and intact.

NECK:  Supple with no JVD without lymph nodes.  Tracheostomy site is clean.

 

LUNGS:  Clear without rhonchi, rales, or wheezing.

HEART:  Normal sounds with regular beats.  There is intermittent

tachycardia at rest.

ABDOMEN:  Soft, nontender with normal bowel sounds.  Gastrostomy site is

clean.

EXTREMITIES:  Warm without cyanosis, clubbing, or edema.



LABORATORY AND DIAGNOSTIC DATA:  Hemoglobin is 7.7, hematocrit 23.1 with

MCV of 89, WBC of 6.8, and platelets 155.  Her BUN and creatinine is 83

and 1.4 respectively.  Sodium is 132, potassium 3.2, chloride 98, CO2 is

28.  Her magnesium is 2.2.  Her phosphorus is 3.5.  Her iron saturation is

20.  SGOT 45, SGPT is normal, alkaline phosphatase is 123.  CRP is 6.7.

[place] [address].  ________ 16.5.  Her CRP was 9.9 yesterday and her proBNP

was 49908 yesterday.  Her troponin was 2.1 two days ago.  It was 1.9

yesterday.



IMPRESSION:  Patient has improved multiple parameters that include CRP,

proBNP, and troponin.  Patient has been assessed by the cardiologist.  To

note, patient had serial EKGs that shows that patient has atrial

fibrillation with rapid ventricular response within 100 to 130 and on the

same time has atrial fibrillation with a rate of 39 to 40 that lasted 6

seconds.  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  However,

Cardiology cleared the patient to undergo upper GI endoscopy because of

her drastic drop in hemoglobin and hematocrit and patient will not require

other interventional tests like stress test or coronary angiogram and

coronary dilation.  The patient will be scheduled for upper GI endoscopy,

but not to colonoscopy under this current condition.  Repeat laboratory

tests will be done in the a.m.









  ______________________________________________

  Lilly Casillas M.D.





DR:  BRANDEN

D:  08/18/2020 17:11

T:  08/18/2020 17:37

JOB#:  [id number]

CC:

## 2020-08-18 NOTE — GENERAL PROGRESS NOTE
Assessment/Plan


Assessment/Plan:





Covering for Dr. Casillas





Assessment and Recs


# Anemia rule out underlying gi bleed


--> anemia panel has been ordered, esr, ferritin, tibc, occult


--> s/p transfusion prbc 8/16


--> gi service if h/h downtrends


--> triple lumen has been placed


--> hgb 7.7


# NSTEMI (non-ST elevated myocardial infarction) with trop elevation


-> on bb, off asa


--> seen by Dr. Redding


--> volume management as per cards/renal


--> trop downtrended


# XUAN (acute kidney injury)


--> cr 1.8-->1.5


# Hyponatremia/Hypokalemoa


--> per renal recs


# Respiratory failure s/p trach


--> consulted pulm


# PNA on cxr


--> as per id recs


# Dysphagia s/p peg


# Dvt ppx scds





Appreciate consultant care, will follow





Subjective


Allergies:  


Coded Allergies:  


     No Known Allergies (Unverified , 8/16/20)


All Systems:  reviewed and negative except above


Subjective


8/18 remains obtunded, dw rn, labs are noted, on vanc/zosyn, mild temp overnight





Objective





Last 24 Hour Vital Signs








  Date Time  Temp Pulse Resp B/P (MAP) Pulse Ox O2 Delivery O2 Flow Rate FiO2


 


8/18/20 05:20  94 16     35


 


8/18/20 04:00  87      


 


8/18/20 04:00      Mechanical Ventilator  


 


8/18/20 04:00        35


 


8/18/20 03:30  92 16     35


 


8/18/20 01:30  108 18     35


 


8/18/20 00:00 99.3 95 19 103/57 (72) 98   


 


8/18/20 00:00      Mechanical Ventilator  


 


8/17/20 23:32  96      


 


8/17/20 23:23  94 16     35


 


8/17/20 21:08 99.9       


 


8/17/20 21:00 99.9       


 


8/17/20 20:32  58 18     35


 


8/17/20 20:00      Mechanical Ventilator  


 


8/17/20 20:00 100.1 86 19 100/52 (68) 100   


 


8/17/20 20:00  73      


 


8/17/20 20:00        35


 


8/17/20 19:30  61 18     35


 


8/17/20 19:30 100.4       


 


8/17/20 17:00  116 20     35


 


8/17/20 16:02  108 21     35


 


8/17/20 16:00        35


 


8/17/20 16:00      Mechanical Ventilator  


 


8/17/20 16:00 98.0 86 19 119/62 (81) 99   


 


8/17/20 16:00  88      


 


8/17/20 13:35  120 21     35


 


8/17/20 12:00 97.9 74 18 121/57 (78) 99   


 


8/17/20 12:00        35


 


8/17/20 12:00  65      


 


8/17/20 12:00      Mechanical Ventilator  


 


8/17/20 11:04  110 20     35


 


8/17/20 08:49  84 15     35


 


8/17/20 08:00  85      


 


8/17/20 08:00        35


 


8/17/20 08:00      Mechanical Ventilator  


 


8/17/20 08:00 98.6 82 18 111/55 (73) 99   


 


8/17/20 07:17  80 13     35

















Intake and Output  


 


 8/17/20 8/18/20





 19:00 07:00


 


Intake Total 1398.75 ml 1069.399 ml


 


Output Total 600 ml 


 


Balance 798.75 ml 1069.399 ml


 


  


 


Intake Free Water 120 ml 


 


IV Total 1278.75 ml 1069.399 ml


 


Output Urine Total 600 ml 


 


# Bowel Movements 4 








Laboratory Tests


8/17/20 15:00: Stool Occult Blood [Pending]


8/18/20 02:50: 


White Blood Count 6.8, Red Blood Count 2.61L, Hemoglobin 7.7L, Hematocrit 23.1L

, Mean Corpuscular Volume 89, Mean Corpuscular Hemoglobin 29.6, Mean 

Corpuscular Hemoglobin Concent 33.4, Red Cell Distribution Width 12.8, Platelet 

Count 155, Mean Platelet Volume 8.1, Neutrophils (%) (Auto) , Lymphocytes (%) (

Auto) , Monocytes (%) (Auto) , Eosinophils (%) (Auto) , Basophils (%) (Auto) , 

Sodium Level 132L, Potassium Level 3.2L, Chloride Level 98, Carbon Dioxide 

Level 28, Anion Gap 6, Blood Urea Nitrogen 83H, Creatinine 1.4H, Estimat 

Glomerular Filtration Rate 35.5, Glucose Level 118H, Calcium Level 7.1L, 

Phosphorus Level 3.5, Magnesium Level 2.2, Total Bilirubin 0.8, Aspartate Amino 

Transf (AST/SGOT) 50H, Alanine Aminotransferase (ALT/SGPT) 45, Alkaline 

Phosphatase 123H, C-Reactive Protein, Quantitative 6.7H, Pro-B-Type Natriuretic 

Peptide 08271M, Total Protein 4.7L, Albumin 1.9L, Globulin 2.8, Albumin/

Globulin Ratio 0.7L, Vitamin B12 Level 1289H


Height (Feet):  5


Height (Inches):  3.00


Weight (Pounds):  137


Objective


PE


General: Obtunded, no spontaneous movement.  No obvious distress


HEENT: NC/AT. 


Neck: Tracheostomy appears appropriately placed


Cardiovascular: RRR.  S1 and S2 normal.  No murmur appreciated


Resp: Normal work of breathing. No cough


Abdomen: Abdomen is soft, nondistended.  Gastrostomy tube appears in 

appropriate position without surrounding signs of infection or trauma.


Skin: Intact.  No abrasions, laceration or rash over the exposed skin


MSK: Normal tone and bulk.


Neuro: Obtunded











Felix Barriga MD Aug 18, 2020 06:41

## 2020-08-18 NOTE — NUR
NURSE NOTES:

culture tip for catheter ordered- as i was endorsed it was ordered by DR. Feldman earlier- and 
order was not in computer.

## 2020-08-18 NOTE — NEPHROLOGY PROGRESS NOTE
Assessment/Plan


Problem List:  


(1) XUAN (acute kidney injury)


(2) NSTEMI (non-ST elevated myocardial infarction)


(3) Anemia


(4) Chronic respiratory failure


(5) Hypotension


(6) Hyponatremia


(7) ICH (intracerebral hemorrhage)


(8) Sepsis


Assessment





Acute renal failure, mainly prerenal picture


Non-STEMI: Troponin 2.1


Chronic ventilatory dependent respiratory failure, history of intracranial bleed


Septic shock with lactic acidosis, possible UTI, possible pneumonia


Severe hyponatremia


Severe anemia, history of GI bleed


Hypertension


GERD


Plan








Hydrate


Hold BP medications, blood pressure is low


IV Protonix, Carafate


Antibiotics per ID


Monitor renal parameters and electrolytes


Potassium supplement as needed


Transfusion as needed


Per consultants





Subjective


ROS Limited/Unobtainable:  Yes





Objective


Objective





Last 24 Hour Vital Signs








  Date Time  Temp Pulse Resp B/P (MAP) Pulse Ox O2 Delivery O2 Flow Rate FiO2


 


8/18/20 12:00        35


 


8/18/20 10:30  120 19     35


 


8/18/20 09:00  94 17     35


 


8/18/20 08:00        35


 


8/18/20 07:47 97.9 94 19 100/77 (85) 99   


 


8/18/20 06:35  116 20     35


 


8/18/20 05:20  94 16     35


 


8/18/20 04:00 98.9 80 19 101/63 (76) 100   


 


8/18/20 04:00  87      


 


8/18/20 04:00      Mechanical Ventilator  


 


8/18/20 04:00        35


 


8/18/20 03:30  92 16     35


 


8/18/20 01:30  108 18     35


 


8/18/20 00:00 99.3 95 19 103/57 (72) 98   


 


8/18/20 00:00      Mechanical Ventilator  


 


8/17/20 23:32  96      


 


8/17/20 23:23  94 16     35


 


8/17/20 21:08 99.9       


 


8/17/20 21:00 99.9       


 


8/17/20 20:32  58 18     35


 


8/17/20 20:00      Mechanical Ventilator  


 


8/17/20 20:00 100.1 86 19 100/52 (68) 100   


 


8/17/20 20:00  73      


 


8/17/20 20:00        35


 


8/17/20 19:30  61 18     35


 


8/17/20 19:30 100.4       


 


8/17/20 17:00  116 20     35


 


8/17/20 16:02  108 21     35


 


8/17/20 16:00        35


 


8/17/20 16:00      Mechanical Ventilator  


 


8/17/20 16:00 98.0 86 19 119/62 (81) 99   


 


8/17/20 16:00  88      


 


8/17/20 13:35  120 21     35

















Intake and Output  


 


 8/17/20 8/18/20





 19:00 07:00


 


Intake Total 1398.75 ml 1069.399 ml


 


Output Total 600 ml 600 ml


 


Balance 798.75 ml 469.399 ml


 


  


 


Intake Free Water 120 ml 


 


IV Total 1278.75 ml 1069.399 ml


 


Output Urine Total 600 ml 600 ml


 


# Bowel Movements 4 








Laboratory Tests


8/17/20 15:00: Stool Occult Blood Positive


8/18/20 02:50: 


White Blood Count 6.8, Red Blood Count 2.61L, Hemoglobin 7.7L, Hematocrit 23.1L

, Mean Corpuscular Volume 89, Mean Corpuscular Hemoglobin 29.6, Mean 

Corpuscular Hemoglobin Concent 33.4, Red Cell Distribution Width 12.8, Platelet 

Count 155, Mean Platelet Volume 8.1, Neutrophils (%) (Auto) , Lymphocytes (%) (

Auto) , Monocytes (%) (Auto) , Eosinophils (%) (Auto) , Basophils (%) (Auto) , 

Sodium Level 132L, Potassium Level 3.2L, Chloride Level 98, Carbon Dioxide 

Level 28, Anion Gap 6, Blood Urea Nitrogen 83H, Creatinine 1.4H, Estimat 

Glomerular Filtration Rate 35.5, Glucose Level 118H, Calcium Level 7.1L, 

Phosphorus Level 3.5, Magnesium Level 2.2, Total Bilirubin 0.8, Aspartate Amino 

Transf (AST/SGOT) 50H, Alanine Aminotransferase (ALT/SGPT) 45, Alkaline 

Phosphatase 123H, C-Reactive Protein, Quantitative 6.7H, Pro-B-Type Natriuretic 

Peptide 52329P, Total Protein 4.7L, Albumin 1.9L, Globulin 2.8, Albumin/

Globulin Ratio 0.7L, Vitamin B12 Level 1289H


Height (Feet):  5


Height (Inches):  2.00


Weight (Pounds):  164


General Appearance:  no apparent distress, lethargic


EENT:  other - On mechanical ventilation


Cardiovascular:  tachycardia


Respiratory/Chest:  decreased breath sounds


Abdomen:  distended


Extremities:  moderate edema











Mando Garcia MD Aug 18, 2020 13:24

## 2020-08-18 NOTE — NUR
CASE MANAGEMENT: REVIEW





SI: ANEMIA . XUAN . PNA 

EGD WHEN CLEARED BY CARDIO 

T 98.0  RR 21 /70 % MECH VENT FIO2 35

H/H 7.7/23.1  K 3.2 BUN 83 CR 1.4 BNP 68550 







IS: VANCOMYCIN IV Q24HR

MICAFUNGIN IV Q24HR

MEROPENEM IV Q12HR

PROTONIX IV Q12HR

D5 NS IVF @ 100ML/HR 







***STEP DOWN UNIT***

DCP: PATIENT IS FROM [facility]

## 2020-08-18 NOTE — DIAGNOSTIC IMAGING REPORT
Indication: Dyspnea

 

Technique: One view of the chest

 

Comparison: 8/17/2020

 

Findings: There are bilateral interstitial and airspace infiltrates versus edema

again demonstrated, stable slightly worse. Left hemidiaphragm is obscured, pleural

effusion likely. There is probably a small amount of pleural fluid on the right as

well. The heart is enlarged. Tracheostomy is again demonstrated.

 

Impression: Stable or slightly worse bilateral interstitial and airspace edema versus

infiltrates

## 2020-08-18 NOTE — NUR
NURSE NOTES:

Dr. Feldman is present at the bedside. Made Dr. Feldman aware of VRE rectume, ESBL urine. New IV 
Abx ordered and orders carried out. TLC line ordered to be removed once peripheral lines 
available.

## 2020-08-18 NOTE — NUR
***INSURANCE***



[facility] 

SPOKE TO: [facility]IT[facility] FULLY DELEGATED

NO AUTH ON FILE YET

FAX CLINCIALS TO [facility]

## 2020-08-18 NOTE — INFECTIOUS DISEASES PROG NOTE
Assessment/Plan





86yo F with:





Hypotension in setting of anemia to 6.0


Normal WBC


Afebrile, Tmax 99.7 --> Febrile to 100.4


GPC Bacteremia


   8/16 BCx 1/2 +GPCs


   MRSA nares p


Possible UTI


   8/16 UA+, UCx >100k ESBL Kleb pna (R-levo @1)


Possible pna, vent dependent via trach


   8/16 CXR: Patchy opacities throughout the lungs which may represent 

pulmonary edema versus infectious/inflammatory process. Possible small pleural 

effusions.


   8/16 COVID rapid Ag neg x1


   8/17 COVID rapid Ag neg x1


Volume OL? BNP 16,470


NSTEMI, troponin 2.1


XUAN on CKD, Cr 1.8, improving





R/o C.dif neg 8/17





Vent dependent, FiO2 35%


S/p trach 5 days pta


H/o GIB


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





PMH: GIB, ICH, trach and vent dependent, HTN, GERD





Plan:


Cont vanco #2 for GPCs in BCx, ?contaminant, will f/u speci/sensi


Stop Zosyn #2


Start meropenem #1 given ESBL Kleb in UCx





Remove R fem CVC given GPCs in BCx, do not place PICC until possible bacteremia 

cleared





F/u BCx


Trend XUAN, improving





Monitor CBC/CMP


Monitor resp status


Monitor temp curve





D/w RN





Thank you for this consult. [facility] will continue to follow.





Subjective


Allergies:  


Coded Allergies:  


     No Known Allergies (Unverified , 8/16/20)





Febrile to 100.4 at 4pm yesterday, AF since


On vent, FiO2 35% satting 99%


NAD





Objective





Last 24 Hour Vital Signs








  Date Time  Temp Pulse Resp B/P (MAP) Pulse Ox O2 Delivery O2 Flow Rate FiO2


 


8/18/20 06:35  116 20     35


 


8/18/20 05:20  94 16     35


 


8/18/20 04:00  87      


 


8/18/20 04:00      Mechanical Ventilator  


 


8/18/20 04:00        35


 


8/18/20 03:30  92 16     35


 


8/18/20 01:30  108 18     35


 


8/18/20 00:00 99.3 95 19 103/57 (72) 98   


 


8/18/20 00:00      Mechanical Ventilator  


 


8/17/20 23:32  96      


 


8/17/20 23:23  94 16     35


 


8/17/20 21:08 99.9       


 


8/17/20 21:00 99.9       


 


8/17/20 20:32  58 18     35


 


8/17/20 20:00      Mechanical Ventilator  


 


8/17/20 20:00 100.1 86 19 100/52 (68) 100   


 


8/17/20 20:00  73      


 


8/17/20 20:00        35


 


8/17/20 19:30  61 18     35


 


8/17/20 19:30 100.4       


 


8/17/20 17:00  116 20     35


 


8/17/20 16:02  108 21     35


 


8/17/20 16:00        35


 


8/17/20 16:00      Mechanical Ventilator  


 


8/17/20 16:00 98.0 86 19 119/62 (81) 99   


 


8/17/20 16:00  88      


 


8/17/20 13:35  120 21     35


 


8/17/20 12:00 97.9 74 18 121/57 (78) 99   


 


8/17/20 12:00        35


 


8/17/20 12:00  65      


 


8/17/20 12:00      Mechanical Ventilator  


 


8/17/20 11:04  110 20     35


 


8/17/20 08:49  84 15     35


 


8/17/20 08:00  85      


 


8/17/20 08:00        35


 


8/17/20 08:00      Mechanical Ventilator  


 


8/17/20 08:00 98.6 82 18 111/55 (73) 99   








Height (Feet):  5


Height (Inches):  3.00


Weight (Pounds):  137





Gen: Older woman, on vent, NAD


HEENT: Trach


CV: RRR


Pulm: CTAB on vent


Abd: Soft, NTND, +PEG


Neuro: Non-responsive


Lines: R fem CVC





Microbiology








 Date/Time


Source Procedure


Growth Status


 


 


 8/16/20 12:15


Blood Blood Culture - Preliminary


NO GROWTH AFTER 24 HOURS Resulted


 


 8/16/20 12:00


Blood Blood Culture - Preliminary


Gram Positive Cocci Resulted


 


 8/17/20 09:05


Nasopharynx SARS-CoV-2 RdRp Gene Assay - Final Complete


 


 8/16/20 10:30


Nasopharynx SARS-CoV-2 RdRp Gene Assay - Final Complete


 


 8/16/20 10:50


Urine,Clean Catch Urine Culture - Preliminary


Gram Negative Bacillus 1 Resulted


 


 8/16/20 12:00


Rectum  Received











Laboratory Tests








Test


  8/17/20


15:00 8/18/20


02:50


 


Stool Occult Blood Pending   


 


White Blood Count


  


  6.8 K/UL


(4.8-10.8)


 


Red Blood Count


  


  2.61 M/UL


(4.20-5.40)  L


 


Hemoglobin


  


  7.7 G/DL


(12.0-16.0)  L


 


Hematocrit


  


  23.1 %


(37.0-47.0)  L


 


Mean Corpuscular Volume  89 FL (80-99)  


 


Mean Corpuscular Hemoglobin


  


  29.6 PG


(27.0-31.0)


 


Mean Corpuscular Hemoglobin


Concent 


  33.4 G/DL


(32.0-36.0)


 


Red Cell Distribution Width


  


  12.8 %


(11.6-14.8)


 


Platelet Count


  


  155 K/UL


(150-450)


 


Mean Platelet Volume


  


  8.1 FL


(6.5-10.1)


 


Neutrophils (%) (Auto)


  


  % (45.0-75.0)


 


 


Lymphocytes (%) (Auto)


  


  % (20.0-45.0)


 


 


Monocytes (%) (Auto)   % (1.0-10.0)  


 


Eosinophils (%) (Auto)   % (0.0-3.0)  


 


Basophils (%) (Auto)   % (0.0-2.0)  


 


Sodium Level


  


  132 MMOL/L


(136-145)  L


 


Potassium Level


  


  3.2 MMOL/L


(3.5-5.1)  L


 


Chloride Level


  


  98 MMOL/L


()


 


Carbon Dioxide Level


  


  28 MMOL/L


(21-32)


 


Anion Gap


  


  6 mmol/L


(5-15)


 


Blood Urea Nitrogen


  


  83 mg/dL


(7-18)  H


 


Creatinine


  


  1.4 MG/DL


(0.55-1.30)  H


 


Estimat Glomerular Filtration


Rate 


  35.5 mL/min


(>60)


 


Glucose Level


  


  118 MG/DL


()  H


 


Calcium Level


  


  7.1 MG/DL


(8.5-10.1)  L


 


Phosphorus Level


  


  3.5 MG/DL


(2.5-4.9)


 


Magnesium Level


  


  2.2 MG/DL


(1.8-2.4)


 


Total Bilirubin


  


  0.8 MG/DL


(0.2-1.0)


 


Aspartate Amino Transf


(AST/SGOT) 


  50 U/L (15-37)


H


 


Alanine Aminotransferase


(ALT/SGPT) 


  45 U/L (12-78)


 


 


Alkaline Phosphatase


  


  123 U/L


()  H


 


C-Reactive Protein,


Quantitative 


  6.7 mg/dL


(0.00-0.90)  H


 


Pro-B-Type Natriuretic Peptide


  


  30573 pg/mL


(0-125)  H


 


Total Protein


  


  4.7 G/DL


(6.4-8.2)  L


 


Albumin


  


  1.9 G/DL


(3.4-5.0)  L


 


Globulin  2.8 g/dL  


 


Albumin/Globulin Ratio


  


  0.7 (1.0-2.7)


L


 


Vitamin B12 Level


  


  1289 PG/ML


(193-986)  H











Current Medications








 Medications


  (Trade)  Dose


 Ordered  Sig/Lisa


 Route


 PRN Reason  Start Time


 Stop Time Status Last Admin


Dose Admin


 


 Acetaminophen


  (Tylenol)  650 mg  Q6H  PRN


 GT


 For Headache  8/17/20 20:30


 9/16/20 20:29  8/17/20 20:38


 


 


 Dextrose/Sodium


 Chloride  1,000 ml @ 


 100 mls/hr  Q10H


 IV


   8/16/20 16:00


 9/15/20 15:59  8/17/20 20:38


 


 


 Docusate Sodium


  (Colace)  100 mg  TWICE A  DAY


 NG


   8/17/20 18:00


 9/16/20 17:59   


 


 


 Pantoprazole


  (Protonix)  40 mg  EVERY 12  HOURS


 IVP


   8/16/20 21:00


 9/15/20 20:59  8/17/20 20:38


 


 


 Piperacillin Sod/


 Tazobactam Sod


 3.375 gm/Sodium


 Chloride  110 ml @ 


 27.5 mls/hr  Q8H


 IVPB


   8/17/20 09:00


 8/24/20 08:59  8/18/20 01:39


 


 


 Potassium Chloride  100 ml @ 


 100 mls/hr  Q1H


 IVPB


   8/18/20 07:00


 8/18/20 10:59   


 


 


 Sucralfate


  (Carafate)  1 gm  FOUR TIMES A  DAY


 GT


   8/16/20 18:00


 11/14/20 17:59  8/17/20 20:38


 


 


 Vancomycin HCl


  ([facility] pharmacy


 to dose)  1 ea  DAILY  PRN


 MISC


 Per rx protocol  8/17/20 16:45


 9/16/20 16:44   


 


 


 Vancomycin HCl


 500 mg/Dextrose  110 ml @ 


 110 mls/hr  Q24H


 IVPB


   8/18/20 21:00


 8/23/20 20:59   


 

















Vianney Feldman M.D. Aug 18, 2020 07:19

## 2020-08-18 NOTE — CARDIAC ELECTROPHYSIOLOGY PN
Assessment/Plan


Assessment/Plan


1. Non-ST elevation myocardial infarction with troponin of 3.2, down to 2 and 

1.9. 


Due to demand ischemia in view hemoglobin of only 6 as


well as renal failure. Her EKG shows sinus rhythm with nonspecific ST-T


wave abnormality and no ST elevation. 





2. Volume overload.  BNP of 19,000 as well as 4+ edema.  However, the


patient is likely intravascularly depleted as her BUN is 112 and


creatinine 1.8.  Improved to 83/1.4. Fu  by Dr. Garcia.





3. Severe hyponatremia, sodium 125.





4. S/P Septic shock with lactic acidosis and Hb 6.  Got PRBC and is on IV 

antibiotic


per ID. OK to proceed with EGD in am.





5. 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


6. Dysphagia, status post PEG placement.


7. Acute renal failure.





DW RN





Subjective


Subjective


 Remained in atrial fib in 70s.Had katt down to 30s and tachy episodes as 

well.  On 35% Fio2 on Vent via Trach. NPO. Scheduled for EGD in am





Objective





Last 24 Hour Vital Signs








  Date Time  Temp Pulse Resp B/P (MAP) Pulse Ox O2 Delivery O2 Flow Rate FiO2


 


8/18/20 17:00  118 18     35


 


8/18/20 16:00        35


 


8/18/20 16:00  105      


 


8/18/20 15:16  122 21     35


 


8/18/20 13:20  73 19     35


 


8/18/20 12:00 98.0 104 19 115/70 (85) 100   


 


8/18/20 12:00      Mechanical Ventilator  


 


8/18/20 12:00  94      


 


8/18/20 12:00        35


 


8/18/20 10:30  120 19     35


 


8/18/20 09:00  94 17     35


 


8/18/20 08:00        35


 


8/18/20 08:00      Mechanical Ventilator  


 


8/18/20 08:00  94      


 


8/18/20 07:47 97.9 94 19 100/77 (85) 99   


 


8/18/20 06:35  116 20     35


 


8/18/20 05:20  94 16     35


 


8/18/20 04:00 98.9 80 19 101/63 (76) 100   


 


8/18/20 04:00  87      


 


8/18/20 04:00      Mechanical Ventilator  


 


8/18/20 04:00        35


 


8/18/20 03:30  92 16     35


 


8/18/20 01:30  108 18     35


 


8/18/20 00:00 99.3 95 19 103/57 (72) 98   


 


8/18/20 00:00      Mechanical Ventilator  


 


8/17/20 23:32  96      


 


8/17/20 23:23  94 16     35


 


8/17/20 21:08 99.9       


 


8/17/20 21:00 99.9       


 


8/17/20 20:32  58 18     35


 


8/17/20 20:00      Mechanical Ventilator  


 


8/17/20 20:00 100.1 86 19 100/52 (68) 100   


 


8/17/20 20:00  73      


 


8/17/20 20:00        35


 


8/17/20 19:30  61 18     35


 


8/17/20 19:30 100.4       

















Intake and Output  


 


 8/17/20 8/18/20





 19:00 07:00


 


Intake Total 1398.75 ml 1069.399 ml


 


Output Total 600 ml 600 ml


 


Balance 798.75 ml 469.399 ml


 


  


 


Intake Free Water 120 ml 


 


IV Total 1278.75 ml 1069.399 ml


 


Output Urine Total 600 ml 600 ml


 


# Bowel Movements 4 











Laboratory Tests








Test


  8/18/20


02:50


 


White Blood Count


  6.8 K/UL


(4.8-10.8)


 


Red Blood Count


  2.61 M/UL


(4.20-5.40)  L


 


Hemoglobin


  7.7 G/DL


(12.0-16.0)  L


 


Hematocrit


  23.1 %


(37.0-47.0)  L


 


Mean Corpuscular Volume 89 FL (80-99)  


 


Mean Corpuscular Hemoglobin


  29.6 PG


(27.0-31.0)


 


Mean Corpuscular Hemoglobin


Concent 33.4 G/DL


(32.0-36.0)


 


Red Cell Distribution Width


  12.8 %


(11.6-14.8)


 


Platelet Count


  155 K/UL


(150-450)


 


Mean Platelet Volume


  8.1 FL


(6.5-10.1)


 


Neutrophils (%) (Auto)


  % (45.0-75.0)


 


 


Lymphocytes (%) (Auto)


  % (20.0-45.0)


 


 


Monocytes (%) (Auto)  % (1.0-10.0)  


 


Eosinophils (%) (Auto)  % (0.0-3.0)  


 


Basophils (%) (Auto)  % (0.0-2.0)  


 


Sodium Level


  132 MMOL/L


(136-145)  L


 


Potassium Level


  3.2 MMOL/L


(3.5-5.1)  L


 


Chloride Level


  98 MMOL/L


()


 


Carbon Dioxide Level


  28 MMOL/L


(21-32)


 


Anion Gap


  6 mmol/L


(5-15)


 


Blood Urea Nitrogen


  83 mg/dL


(7-18)  H


 


Creatinine


  1.4 MG/DL


(0.55-1.30)  H


 


Estimat Glomerular Filtration


Rate 35.5 mL/min


(>60)


 


Glucose Level


  118 MG/DL


()  H


 


Calcium Level


  7.1 MG/DL


(8.5-10.1)  L


 


Phosphorus Level


  3.5 MG/DL


(2.5-4.9)


 


Magnesium Level


  2.2 MG/DL


(1.8-2.4)


 


Total Bilirubin


  0.8 MG/DL


(0.2-1.0)


 


Aspartate Amino Transf


(AST/SGOT) 50 U/L (15-37)


H


 


Alanine Aminotransferase


(ALT/SGPT) 45 U/L (12-78)


 


 


Alkaline Phosphatase


  123 U/L


()  H


 


C-Reactive Protein,


Quantitative 6.7 mg/dL


(0.00-0.90)  H


 


Pro-B-Type Natriuretic Peptide


  11775 pg/mL


(0-125)  H


 


Total Protein


  4.7 G/DL


(6.4-8.2)  L


 


Albumin


  1.9 G/DL


(3.4-5.0)  L


 


Globulin 2.8 g/dL  


 


Albumin/Globulin Ratio


  0.7 (1.0-2.7)


L


 


Vitamin B12 Level


  1289 PG/ML


(193-986)  H











Microbiology








 Date/Time


Source Procedure


Growth Status


 


 


 8/16/20 12:15


Blood Blood Culture - Preliminary Resulted


 


 8/16/20 12:00


Blood Blood Culture - Preliminary


Gram Positive Cocci Resulted


 


 8/17/20 09:05


Nasopharynx SARS-CoV-2 RdRp Gene Assay - Final Complete


 


 8/16/20 12:00


Nasal Nares MRSA Culture - Final


NO METHICILLIN RESISTANT STAPH AUREUS... Complete


 


 8/16/20 10:30


Nasopharynx SARS-CoV-2 RdRp Gene Assay - Final Complete


 


 8/17/20 15:00


Stool Clostridium difficile Toxin Assay - Final Complete


 


 8/16/20 10:50


Urine,Clean Catch Urine Culture - Final


Klebsiella Pneumoniae Esbl Complete


 


 8/16/20 12:00


Rectum - Final


NO CARBAPENEM-RESISTANT ENTEROBACTERI... Complete


 


 8/16/20 12:00


Rectum VRE Culture - Final


Enterococcus Faecium - Vre Complete








Objective





HEAD AND NECK:  Status post tracheostomy, which is fresh.


LUNGS:  Coarse rhonchi.


CARDIOVASCULAR:  Regular S1 and S2 with no gallop.


ABDOMEN:  Status post G-tube.


EXTREMITIES:   3+ pitting edema.











Antione Redding MD Aug 18, 2020 17:39

## 2020-08-18 NOTE — NUR
NURSE NOTES:

assessed pt. to right femoral area where femoral line was removed prior to shift- no 
bleeding noted- gauze appears to be dry- will continue to monitor pt. and with plan of care.

## 2020-08-18 NOTE — NUR
NURSE HAND-OFF REPORT: 



Important Events on Shift: No changes

Patient Status: Stable

Diet: NPO



Pending Orders: 

Pending Results/Labs:

Pending MD notification:



Latest Vital Signs: Temperature 97.9 , Pulse 94 , B/P 100 /77 , Respiratory Rate 19 , O2 SAT 
99 , Mechanical Ventilator, O2 Flow Rate .  

Vital Sign Comment: 



EKG Rhythm: Atrial Fibrillation

Rhythm change?: N 

MD Notified?: N -

MD Response: 



Latest Wilkinson Fall Score: 70  

Fall Risk: High Risk 

Safety Measures: Call light Within Reach, Bed Alarm Zone 1, Side Rails Side Rails x3, Bed 
position Low and Locked.

Fall Precautions: 

Yellow Socks

Yellow Gown

Patient Fall Education



Report given to ROSALBA Macdonald.

## 2020-08-18 NOTE — NUR
NURSE HAND-OFF REPORT: 



Important Events on Shift:Hypokalemia

Patient Status: stable

Diet: NPO



Pending Orders: n/a

Pending Results/Labs:Tip culture

Pending MD notification:n/a



Latest Vital Signs: Temperature 98.0 , Pulse 118 , B/P 115 /61 , Respiratory Rate 18 , O2 
 , Mechanical Ventilator, O2 Flow Rate .  

Vital Sign Comment: VSS



EKG Rhythm: Atrial Fibrillation

Rhythm change?: N 

MD Notified?: N -

MD Response: 



Latest Wilkinson Fall Score: 70  

Fall Risk: High Risk 

Safety Measures: Call light Within Reach, Bed Alarm Zone 1, Side Rails Side Rails x3, Bed 
position Low and Locked.

Fall Precautions: 

Yellow Socks

Yellow Gown

Patient Fall Education



Report given to ayde Hernandez

## 2020-08-18 NOTE — SURGERY PROGRESS NOTE
Surgery Progress Note


Subjective


Additional Comments


h/h noted


no active bleeding


trach site okay


dressings going well





Objective





Last 24 Hour Vital Signs








  Date Time  Temp Pulse Resp B/P (MAP) Pulse Ox O2 Delivery O2 Flow Rate FiO2


 


8/18/20 13:20  73 19     35


 


8/18/20 12:00 98.0 104 19 115/70 (85) 100   


 


8/18/20 12:00        35


 


8/18/20 10:30  120 19     35


 


8/18/20 09:00  94 17     35


 


8/18/20 08:00        35


 


8/18/20 07:47 97.9 94 19 100/77 (85) 99   


 


8/18/20 06:35  116 20     35


 


8/18/20 05:20  94 16     35


 


8/18/20 04:00 98.9 80 19 101/63 (76) 100   


 


8/18/20 04:00  87      


 


8/18/20 04:00      Mechanical Ventilator  


 


8/18/20 04:00        35


 


8/18/20 03:30  92 16     35


 


8/18/20 01:30  108 18     35


 


8/18/20 00:00 99.3 95 19 103/57 (72) 98   


 


8/18/20 00:00      Mechanical Ventilator  


 


8/17/20 23:32  96      


 


8/17/20 23:23  94 16     35


 


8/17/20 21:08 99.9       


 


8/17/20 21:00 99.9       


 


8/17/20 20:32  58 18     35


 


8/17/20 20:00      Mechanical Ventilator  


 


8/17/20 20:00 100.1 86 19 100/52 (68) 100   


 


8/17/20 20:00  73      


 


8/17/20 20:00        35


 


8/17/20 19:30  61 18     35


 


8/17/20 19:30 100.4       


 


8/17/20 17:00  116 20     35


 


8/17/20 16:02  108 21     35


 


8/17/20 16:00        35


 


8/17/20 16:00      Mechanical Ventilator  


 


8/17/20 16:00 98.0 86 19 119/62 (81) 99   


 


8/17/20 16:00  88      








I&O











Intake and Output  


 


 8/17/20 8/18/20





 19:00 07:00


 


Intake Total 1398.75 ml 1069.399 ml


 


Output Total 600 ml 600 ml


 


Balance 798.75 ml 469.399 ml


 


  


 


Intake Free Water 120 ml 


 


IV Total 1278.75 ml 1069.399 ml


 


Output Urine Total 600 ml 600 ml


 


# Bowel Movements 4 








Dressing:  saturated


Wound:  clean


Cardiovascular:  RSR


Respiratory:  decreased breath sounds


Abdomen:  soft, non-tender, present bowel sounds


Extremities:  no tenderness, no cyanosis





Laboratory Tests








Test


  8/17/20


15:00 8/18/20


02:50


 


Stool Occult Blood


  Positive


(NEGATIVE) 


 


 


White Blood Count


  


  6.8 K/UL


(4.8-10.8)


 


Red Blood Count


  


  2.61 M/UL


(4.20-5.40)  L


 


Hemoglobin


  


  7.7 G/DL


(12.0-16.0)  L


 


Hematocrit


  


  23.1 %


(37.0-47.0)  L


 


Mean Corpuscular Volume  89 FL (80-99)  


 


Mean Corpuscular Hemoglobin


  


  29.6 PG


(27.0-31.0)


 


Mean Corpuscular Hemoglobin


Concent 


  33.4 G/DL


(32.0-36.0)


 


Red Cell Distribution Width


  


  12.8 %


(11.6-14.8)


 


Platelet Count


  


  155 K/UL


(150-450)


 


Mean Platelet Volume


  


  8.1 FL


(6.5-10.1)


 


Neutrophils (%) (Auto)


  


  % (45.0-75.0)


 


 


Lymphocytes (%) (Auto)


  


  % (20.0-45.0)


 


 


Monocytes (%) (Auto)   % (1.0-10.0)  


 


Eosinophils (%) (Auto)   % (0.0-3.0)  


 


Basophils (%) (Auto)   % (0.0-2.0)  


 


Sodium Level


  


  132 MMOL/L


(136-145)  L


 


Potassium Level


  


  3.2 MMOL/L


(3.5-5.1)  L


 


Chloride Level


  


  98 MMOL/L


()


 


Carbon Dioxide Level


  


  28 MMOL/L


(21-32)


 


Anion Gap


  


  6 mmol/L


(5-15)


 


Blood Urea Nitrogen


  


  83 mg/dL


(7-18)  H


 


Creatinine


  


  1.4 MG/DL


(0.55-1.30)  H


 


Estimat Glomerular Filtration


Rate 


  35.5 mL/min


(>60)


 


Glucose Level


  


  118 MG/DL


()  H


 


Calcium Level


  


  7.1 MG/DL


(8.5-10.1)  L


 


Phosphorus Level


  


  3.5 MG/DL


(2.5-4.9)


 


Magnesium Level


  


  2.2 MG/DL


(1.8-2.4)


 


Total Bilirubin


  


  0.8 MG/DL


(0.2-1.0)


 


Aspartate Amino Transf


(AST/SGOT) 


  50 U/L (15-37)


H


 


Alanine Aminotransferase


(ALT/SGPT) 


  45 U/L (12-78)


 


 


Alkaline Phosphatase


  


  123 U/L


()  H


 


C-Reactive Protein,


Quantitative 


  6.7 mg/dL


(0.00-0.90)  H


 


Pro-B-Type Natriuretic Peptide


  


  64447 pg/mL


(0-125)  H


 


Total Protein


  


  4.7 G/DL


(6.4-8.2)  L


 


Albumin


  


  1.9 G/DL


(3.4-5.0)  L


 


Globulin  2.8 g/dL  


 


Albumin/Globulin Ratio


  


  0.7 (1.0-2.7)


L


 


Vitamin B12 Level


  


  1289 PG/ML


(193-986)  H











Plan


Problems:  


(1) Sepsis


(2) Nosocomial pneumonia


(3) Hyponatremia


(4) UTI (urinary tract infection)


(5) Hypotension


(6) Chronic respiratory failure


Assessment & Plan:  87F ill appearing trach on vent


trach evaluated and 8f xlt noted


sutures in place causing tension and breakdown


sutures removed at bedside


trach stoma evaluated and with breakdown.  dressings applied


trach in place and functional


cont vent support


will monitor and follow with recs


thank you 





Pt presented on admission with Tracheostomy ,Generalized edema, Multiple 

Pressure injuries.


DTPI that is de-capped L Scapula(L)3.4cmx (W)7.3cm. Base of wound is 90% slough 

10% Loose purpuric base and edges. Surrounding non-blanching erythema.


Inferior L scapula ,but in close proximity is DTPI (L)1.9cm x (W)4.4cm. Base of 

wound is purpuric with Maroon borders. 


Within close proximity to both wounds #3 linear purpuric areas that fans from 

scapula area outward towards thoracic spine.


DTPI Sacrococcygeal to L Buttocks (L)7.4cm x (W)4.3cm.Base of wound is purpuric 

and indurated with surrounding non-blanching erythema.


Two Partial thickness pressure injuries noted to L buttocks(Proximal)2.5cm x (W)

4.6cm. Base of wound is moist and viable.Surrounding Non-Blanching erythema.(

Distal)1cm x (W)1cm. Base of wound is moist and viable.Surrounding Non-

Blanching erythema.


Perineal area is erythematous and moist . A purpuric area that is fluctuant 

noted at Perineum.


Bilat lower ext are edematous. Pt noted to have a large serous filled Bulla R 

Heel(L)11.5cm x (W)11.5cm.Surrounding area of heel is firm and pink. A second 

smaller serous Bulla noted to dorso/lateral R foot (L)2cm x(W)2.2cm.Scattered 

small purpuric areas noted to heads of 2nd ,3rd,4th metatarsals.


Multiple purpuric areas noted to distal/lateral L tibia and L foot.


Elongated Purpuric Area with Marginal erythema along edges noted to distal/

Lateral L tibia(L)7cm x (W)1.8cm.


L Heel /L Foot is an irregular shaped Purpuric area with marginal erythema 

without fluctuance/induration (L)3cm x (W)9.5cm. 


L Lateral Malleolus DTPI(L)1.5cm x (W)1.9cm.. Base of injury is fluctuant, 

purpuric with Maroon borders.


DTPI distal/lateral L foot (L)1.5cm x (W)1.5cm. Base of injury is maroon with 

fluctuance.


L Heel is boggy with non-blanchable erythema.





Tx.Plan: Cleanse wound L Scapula with Saline. Apply Therahoney. Apply Cavilon 

Skin Barrier Periwound. Cover with Optifoam 


            Drsg every 3 days and prn.


            Apply Cavilon Skin Barrier to Purpuric areas L Scapula. Cover with 

Optifoam drsg every 3 days and prn.


            Apply Moisture Barrier Paste to Sacrum and L Buttocks. Cover each 

site with Optifoam drsgs. Change every 3 days


            and PRN.


            Apply Cavilon Skin Barrier Spray to Blisters R foot. Cover with ABD 

Pads and wrap with Kerlix every 3 days and prn.


            Apply Cavilon Barrier Spray To Purpuric areas Distal L tibia and L 

foot. Cover with ABD Pads and wrap with Kerlix 


            every 3 days and prn.


            Reposition at least every 2hors or as tolerated .


            Off-load heels with Pillow.


            Place Pillow between knees.


            APM/HEATHER Mattress.








(7) Anemia


(8) NSTEMI (non-ST elevated myocardial infarction)


(9) Feeding by G-tube


Assessment & Plan:  DAILY ESTIMATED NEEDS:


Needs based on wound, critical care 47.5kg abw 


25-30  kcals/kg 


0922-7077  total kcals


1.25-2  g protein/kg


59-95  g total protein 


25-30ml/kcal   mL/kg


4410-2380  total fluid mLs





NUTRITION DIAGNOSIS:


Swallowing difficulty r/t respiratory status as evidenced by pt is trach


and peg dep.





CURRENT TF: NPO for EGD  





ENTERAL NUTRITION RECOMMENDATIONS:


Osmolite 1.2 goal of 45ml/hr x24 hrs + Prosource 1 pack daily    to provide 

1080ml, 1296 kcal, 60g + 11g pro, 886ml free H2O  





- As medically able, start Osmolite low fiber TF @low rate 25ml/hr for 6 hrs.


- Advance as tolerated 10ml/hr q4-6 hrs to goal


- Flush per MD/ HOB over 30 degrees


- Add Prosource 1 pack daily to better meet est pro needs.








ADDITIONAL RECOMMENDATIONS:


1) F/up w/ WC eval, add KEVIN BID w/ TF order 


2) Per [facility] : 4'8" (56 inches) and 142 lbs/64.55kg 


3) Monitor renal function, lytes; need for renal TF formula  


4) Monitor BG and need for carb control formula 


   -> rec accuchecks + niss  





(10) XUAN (acute kidney injury)


(11) Chronic vegetative state


(12) ICH (intracerebral hemorrhage)











Reymundo Driscoll Aug 18, 2020 14:43

## 2020-08-18 NOTE — NUR
NURSE NOTES:

Received report from ROSALBA Smith. Pt in bed resting. Responsive to tactile stimuli. IV 
site in right femoral TLC line running D5 NS@100ml/hr patent. Side railxx3 up for safety. 
G-tube site clean and intact and feeding on hold due to possible EGD today as ordered. On 
HEATHER mattress. Call light within easy reach. Will continue plan of care.

## 2020-08-18 NOTE — NUR
NURSE NOTES:

Received report form ROSALBA Stover, pt, in bed awake, no signs or symptoms of acute cardiac or 
respiratory distress noted, bed alarm on, side rails up x's3 and safety brakes engaged call 
light within easy reach, pt. appears to be tolerating current vent settings well- AC 10, TV 
400, fio2 35% and peep 5- no distress noted, pt. has G tube clamped- But NPO for EGD in am, 
HOB elevated, Santizo intact and draining to gravity, pt. appears clean and dry, Left hand 24G 
IV intact and patent, RON 20G IV intact and patent running D5NS at 100cc/hr, safety measures 
continued, will continue with plan of care.

## 2020-08-18 NOTE — GENERAL PROGRESS NOTE
Assessment/Plan


Problem List:  


(1) Feeding by G-tube


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


SNOMED:  731052518, 901820807, 211521806


(2) Chronic respiratory failure


ICD Codes:  J96.10 - Chronic respiratory failure, unspecified whether with 

hypoxia or hypercapnia


SNOMED:  99679307


(3) Hyponatremia


ICD Codes:  E87.1 - Hypo-osmolality and hyponatremia


SNOMED:  74041738


(4) UTI (urinary tract infection)


ICD Codes:  N39.0 - Urinary tract infection, site not specified


SNOMED:  46904303


(5) Hypotension


ICD Codes:  I95.9 - Hypotension, unspecified


SNOMED:  68425285


(6) NSTEMI (non-ST elevated myocardial infarction)


ICD Codes:  I21.4 - Non-ST elevation (NSTEMI) myocardial infarction


SNOMED:  84230478


(7) Anemia


ICD Codes:  D64.9 - Anemia, unspecified


SNOMED:  950021404


(8) XUAN (acute kidney injury)


ICD Codes:  N17.9 - Acute kidney failure, unspecified


SNOMED:  48206686, 8135270


Assessment/Plan:


ppi


GTF 


abx per ID


monitor H&H


plan EGD if cleared by cardiology


stool ob





Subjective


ROS Limited/Unobtainable:  No


Allergies:  


Coded Allergies:  


     No Known Allergies (Unverified , 8/16/20)





Objective





Last 24 Hour Vital Signs








  Date Time  Temp Pulse Resp B/P (MAP) Pulse Ox O2 Delivery O2 Flow Rate FiO2


 


8/18/20 07:47 97.9 94 19 100/77 (85) 99   


 


8/18/20 06:35  116 20     35


 


8/18/20 05:20  94 16     35


 


8/18/20 04:00 98.9 80 19 101/63 (76) 100   


 


8/18/20 04:00  87      


 


8/18/20 04:00      Mechanical Ventilator  


 


8/18/20 04:00        35


 


8/18/20 03:30  92 16     35


 


8/18/20 01:30  108 18     35


 


8/18/20 00:00 99.3 95 19 103/57 (72) 98   


 


8/18/20 00:00      Mechanical Ventilator  


 


8/17/20 23:32  96      


 


8/17/20 23:23  94 16     35


 


8/17/20 21:08 99.9       


 


8/17/20 21:00 99.9       


 


8/17/20 20:32  58 18     35


 


8/17/20 20:00      Mechanical Ventilator  


 


8/17/20 20:00 100.1 86 19 100/52 (68) 100   


 


8/17/20 20:00  73      


 


8/17/20 20:00        35


 


8/17/20 19:30  61 18     35


 


8/17/20 19:30 100.4       


 


8/17/20 17:00  116 20     35


 


8/17/20 16:02  108 21     35


 


8/17/20 16:00        35


 


8/17/20 16:00      Mechanical Ventilator  


 


8/17/20 16:00 98.0 86 19 119/62 (81) 99   


 


8/17/20 16:00  88      


 


8/17/20 13:35  120 21     35


 


8/17/20 12:00 97.9 74 18 121/57 (78) 99   


 


8/17/20 12:00        35


 


8/17/20 12:00  65      


 


8/17/20 12:00      Mechanical Ventilator  


 


8/17/20 11:04  110 20     35

















Intake and Output  


 


 8/17/20 8/18/20





 19:00 07:00


 


Intake Total 1398.75 ml 1069.399 ml


 


Output Total 600 ml 600 ml


 


Balance 798.75 ml 469.399 ml


 


  


 


Intake Free Water 120 ml 


 


IV Total 1278.75 ml 1069.399 ml


 


Output Urine Total 600 ml 600 ml


 


# Bowel Movements 4 








Laboratory Tests


8/17/20 15:00: Stool Occult Blood [Pending]


8/18/20 02:50: 


White Blood Count 6.8, Red Blood Count 2.61L, Hemoglobin 7.7L, Hematocrit 23.1L

, Mean Corpuscular Volume 89, Mean Corpuscular Hemoglobin 29.6, Mean 

Corpuscular Hemoglobin Concent 33.4, Red Cell Distribution Width 12.8, Platelet 

Count 155, Mean Platelet Volume 8.1, Neutrophils (%) (Auto) , Lymphocytes (%) (

Auto) , Monocytes (%) (Auto) , Eosinophils (%) (Auto) , Basophils (%) (Auto) , 

Sodium Level 132L, Potassium Level 3.2L, Chloride Level 98, Carbon Dioxide 

Level 28, Anion Gap 6, Blood Urea Nitrogen 83H, Creatinine 1.4H, Estimat 

Glomerular Filtration Rate 35.5, Glucose Level 118H, Calcium Level 7.1L, 

Phosphorus Level 3.5, Magnesium Level 2.2, Total Bilirubin 0.8, Aspartate Amino 

Transf (AST/SGOT) 50H, Alanine Aminotransferase (ALT/SGPT) 45, Alkaline 

Phosphatase 123H, C-Reactive Protein, Quantitative 6.7H, Pro-B-Type Natriuretic 

Peptide 45838C, Total Protein 4.7L, Albumin 1.9L, Globulin 2.8, Albumin/

Globulin Ratio 0.7L, Vitamin B12 Level 1289H


Height (Feet):  5


Height (Inches):  3.00


Weight (Pounds):  137


General Appearance:  no apparent distress


EENT:  normal ENT inspection


Neck:  supple


Cardiovascular:  normal rate


Respiratory/Chest:  decreased breath sounds


Abdomen:  normal bowel sounds, non tender, soft


Extremities:  non-tender











George Mir MD Aug 18, 2020 09:17

## 2020-08-18 NOTE — PULMONOLGY CRITICAL CARE NOTE
Critical Care - Asmt/Plan


Problems:  


(1) ICH (intracerebral hemorrhage)


(2) Chronic vegetative state


(3) Feeding by G-tube


(4) Chronic respiratory failure


(5) Hyponatremia


Respiratory:  monitor respiratory rate, adjust FIO2, CXR


Cardiac:  continue pressors, continue to monitor HR/BP


Renal:  F/U I&O, keep IV fluid, check electrolytes


Infectious Disease:  check cultures, continue antibiotics


Gastrointestinal:  continue feedings/current rate


Endocrine:  monitor blood sugar, check TSH, continue sliding scale insulin


Hematologic:  transfuse if hgb<8.5


Neurologic:  PRN Ativan, keep patient comfortable


Affect:  PRN ativan


Prophylaxis:  Protonix


Time Spent (Minutes):  40


Notes Reviewed:  internist, cardio


Discussed with:  





Critical Care - Objective





Last 24 Hour Vital Signs








  Date Time  Temp Pulse Resp B/P (MAP) Pulse Ox O2 Delivery O2 Flow Rate FiO2


 


8/18/20 10:30  120 19     35


 


8/18/20 09:00  94 17     35


 


8/18/20 07:47 97.9 94 19 100/77 (85) 99   


 


8/18/20 06:35  116 20     35


 


8/18/20 05:20  94 16     35


 


8/18/20 04:00 98.9 80 19 101/63 (76) 100   


 


8/18/20 04:00  87      


 


8/18/20 04:00      Mechanical Ventilator  


 


8/18/20 04:00        35


 


8/18/20 03:30  92 16     35


 


8/18/20 01:30  108 18     35


 


8/18/20 00:00 99.3 95 19 103/57 (72) 98   


 


8/18/20 00:00      Mechanical Ventilator  


 


8/17/20 23:32  96      


 


8/17/20 23:23  94 16     35


 


8/17/20 21:08 99.9       


 


8/17/20 21:00 99.9       


 


8/17/20 20:32  58 18     35


 


8/17/20 20:00      Mechanical Ventilator  


 


8/17/20 20:00 100.1 86 19 100/52 (68) 100   


 


8/17/20 20:00  73      


 


8/17/20 20:00        35


 


8/17/20 19:30  61 18     35


 


8/17/20 19:30 100.4       


 


8/17/20 17:00  116 20     35


 


8/17/20 16:02  108 21     35


 


8/17/20 16:00        35


 


8/17/20 16:00      Mechanical Ventilator  


 


8/17/20 16:00 98.0 86 19 119/62 (81) 99   


 


8/17/20 16:00  88      


 


8/17/20 13:35  120 21     35


 


8/17/20 12:00 97.9 74 18 121/57 (78) 99   


 


8/17/20 12:00        35


 


8/17/20 12:00  65      


 


8/17/20 12:00      Mechanical Ventilator  








Status:  awake, sedated


Condition:  critical


HEENT:  atraumatic, normocephalic


Neck:  full ROM


Lungs:  clear


Heart:  HR/BP stable


Abdomen:  soft, active bowel sounds, feeding tube


Extremities:  no C/C/E, edema


Micro:





Microbiology








 Date/Time


Source Procedure


Growth Status


 


 


 8/16/20 12:15


Blood Blood Culture - Preliminary


NO GROWTH AFTER 24 HOURS Resulted


 


 8/16/20 12:00


Blood Blood Culture - Preliminary


Gram Positive Cocci Resulted


 


 8/17/20 09:05


Nasopharynx SARS-CoV-2 RdRp Gene Assay - Final Complete


 


 8/16/20 12:00


Nasal Nares MRSA Culture - Final


NO METHICILLIN RESISTANT STAPH AUREUS... Complete


 


 8/16/20 10:30


Nasopharynx SARS-CoV-2 RdRp Gene Assay - Final Complete


 


 8/17/20 15:00


Stool Clostridium difficile Toxin Assay - Final Complete


 


 8/16/20 10:50


Urine,Clean Catch Urine Culture - Final


Klebsiella Pneumoniae Esbl Complete


 


 8/16/20 12:00


Rectum  Received











Critical Care - Subjective


ROS Limited/Unobtainable:  Yes


EKG Rhythm:  Sinus Rhythm


FI02:  35


Vent Support Breath Rate:  10


Vent Support Mode:  AC


Vent Tidal Volume:  400


Sputum Amount:  Small


PEEP:  5.0


PIP:  29


I&O:











Intake and Output  


 


 8/17/20 8/18/20





 19:00 07:00


 


Intake Total 1398.75 ml 1069.399 ml


 


Output Total 600 ml 600 ml


 


Balance 798.75 ml 469.399 ml


 


  


 


Intake Free Water 120 ml 


 


IV Total 1278.75 ml 1069.399 ml


 


Output Urine Total 600 ml 600 ml


 


# Bowel Movements 4 








Labs:





Laboratory Tests








Test


  8/17/20


15:00 8/18/20


02:50


 


Stool Occult Blood Pending   


 


White Blood Count


  


  6.8 K/UL


(4.8-10.8)


 


Red Blood Count


  


  2.61 M/UL


(4.20-5.40)  L


 


Hemoglobin


  


  7.7 G/DL


(12.0-16.0)  L


 


Hematocrit


  


  23.1 %


(37.0-47.0)  L


 


Mean Corpuscular Volume  89 FL (80-99)  


 


Mean Corpuscular Hemoglobin


  


  29.6 PG


(27.0-31.0)


 


Mean Corpuscular Hemoglobin


Concent 


  33.4 G/DL


(32.0-36.0)


 


Red Cell Distribution Width


  


  12.8 %


(11.6-14.8)


 


Platelet Count


  


  155 K/UL


(150-450)


 


Mean Platelet Volume


  


  8.1 FL


(6.5-10.1)


 


Neutrophils (%) (Auto)


  


  % (45.0-75.0)


 


 


Lymphocytes (%) (Auto)


  


  % (20.0-45.0)


 


 


Monocytes (%) (Auto)   % (1.0-10.0)  


 


Eosinophils (%) (Auto)   % (0.0-3.0)  


 


Basophils (%) (Auto)   % (0.0-2.0)  


 


Sodium Level


  


  132 MMOL/L


(136-145)  L


 


Potassium Level


  


  3.2 MMOL/L


(3.5-5.1)  L


 


Chloride Level


  


  98 MMOL/L


()


 


Carbon Dioxide Level


  


  28 MMOL/L


(21-32)


 


Anion Gap


  


  6 mmol/L


(5-15)


 


Blood Urea Nitrogen


  


  83 mg/dL


(7-18)  H


 


Creatinine


  


  1.4 MG/DL


(0.55-1.30)  H


 


Estimat Glomerular Filtration


Rate 


  35.5 mL/min


(>60)


 


Glucose Level


  


  118 MG/DL


()  H


 


Calcium Level


  


  7.1 MG/DL


(8.5-10.1)  L


 


Phosphorus Level


  


  3.5 MG/DL


(2.5-4.9)


 


Magnesium Level


  


  2.2 MG/DL


(1.8-2.4)


 


Total Bilirubin


  


  0.8 MG/DL


(0.2-1.0)


 


Aspartate Amino Transf


(AST/SGOT) 


  50 U/L (15-37)


H


 


Alanine Aminotransferase


(ALT/SGPT) 


  45 U/L (12-78)


 


 


Alkaline Phosphatase


  


  123 U/L


()  H


 


C-Reactive Protein,


Quantitative 


  6.7 mg/dL


(0.00-0.90)  H


 


Pro-B-Type Natriuretic Peptide


  


  78478 pg/mL


(0-125)  H


 


Total Protein


  


  4.7 G/DL


(6.4-8.2)  L


 


Albumin


  


  1.9 G/DL


(3.4-5.0)  L


 


Globulin  2.8 g/dL  


 


Albumin/Globulin Ratio


  


  0.7 (1.0-2.7)


L


 


Vitamin B12 Level


  


  1289 PG/ML


(193-986)  H

















Shiela Causey MD Aug 18, 2020 11:32

## 2020-08-18 NOTE — PROGRESS NOTE
DATE:  08/17/2020

HISTORY OF PRESENT ILLNESS:  For the purpose of this report, the medical

chart has been reviewed in detail and the patient had a report of multiple

specialists have been seen and assessed.  The patient has been reviewed

and the case has been discussed with the treatment team.  The patient is

an 87-year-old lady, who is a resident of an extended care facility only

for a few days with multiple chronic medical syndrome, was transferred to

[facility] here because of hypotension and significant drop in

hemoglobin and hematocrit.  Assessment in the emergency room, I believe

the patient became hypotensive and nonresponsive and initially was seen by

a cardiologist because of hypotension and tachycardia.  Review of her

laboratory tests, hemoglobin was 6 and hematocrit of 18.3.  She has been

seen by _____ and chest x-ray suggested pulmonary edema.  She has been

seen also by hematologist, nephrologist, and Gastroenterology as well as

Infectious Disease especially.  Due to her drop in hemoglobin and

hematocrit, she was scheduled to undergo upper GI endoscopy, stool for

_____ iron, total iron-binding capacity, and ferritin were assessed.



PHYSICAL EXAMINATION:

VITAL SIGNS:  Blood pressure is 100/52, pulse is 73, respirations are 19,

temperature is 100.1.

HEENT:  Eyes were normal.  ENT, mucous membranes were dry and

intact.

NECK:  Supple with no JVD without lymph nodes.  Tracheostomy site is clean.

 

LUNGS:  There are bilateral rhonchi at both bases.

HEART:  Normal sounds with regular beats.  There is bradycardia at rest to

39.

ABDOMEN:  Soft, nontender with normal bowel sounds.  Gastrostomy site is

clean.

EXTREMITIES:  Warm without cyanosis, clubbing, or edema.



LABORATORY AND DIAGNOSTIC DATA:  Hemoglobin is 8.6, hematocrit 25.5 with

MCV of 87, WBC of 8.0, and platelets is 141.  Her BUN and creatinine is

103 and 1.5 respectively.  Her sodium is 130, potassium 3.1, chloride 92,

CO2 is 31, uric acid is 9.8.  Her calcium is 6.8, magnesium is 2.2, and

phosphorus is 4.5.  Her iron saturation is 20.  Her iron is 23 and total

iron-binding capacity is 115.  Her ferritin is 1338.  LDH was 462.

Troponin is 1.9.  CRP is 9.9.  ProBNP is 16,470.  Today chest x-ray showed

cardiomegaly.  There is bilateral increased interstitial marking.  There

is bilateral pleural effusion, left greater than right.  These findings

are unchanged since yesterday.



Potassium was corrected.  She continued to have elevation.  She is

currently on vancomycin 1 g IV piggyback q.24 h.  She is on

piperacillin/tazobactam 3.375 g IV piggyback q.8 h.  She is on

pantoprazole 40 mg IV piggyback q.12 h., sucralfate 1 g IV piggyback via

G-tube q.6 h.  She received _____ normal saline at 100 mL per hour.

Repeat laboratory tests will be done in the a.m.









  ______________________________________________

  Lilly Casillas M.D.





DR:  BRITTNI

D:  08/17/2020 22:19

T:  08/18/2020 04:02

JOB#:  [id number]

CC:

## 2020-08-19 VITALS — DIASTOLIC BLOOD PRESSURE: 89 MMHG | SYSTOLIC BLOOD PRESSURE: 136 MMHG

## 2020-08-19 VITALS — DIASTOLIC BLOOD PRESSURE: 83 MMHG | SYSTOLIC BLOOD PRESSURE: 134 MMHG

## 2020-08-19 VITALS — DIASTOLIC BLOOD PRESSURE: 96 MMHG | SYSTOLIC BLOOD PRESSURE: 120 MMHG

## 2020-08-19 VITALS — SYSTOLIC BLOOD PRESSURE: 131 MMHG | DIASTOLIC BLOOD PRESSURE: 82 MMHG

## 2020-08-19 VITALS — SYSTOLIC BLOOD PRESSURE: 144 MMHG | DIASTOLIC BLOOD PRESSURE: 89 MMHG

## 2020-08-19 VITALS — SYSTOLIC BLOOD PRESSURE: 111 MMHG | DIASTOLIC BLOOD PRESSURE: 74 MMHG

## 2020-08-19 LAB
ADD MANUAL DIFF: YES
ALBUMIN SERPL-MCNC: 2.3 G/DL (ref 3.4–5)
ALBUMIN/GLOB SERPL: 0.8 {RATIO} (ref 1–2.7)
ALP SERPL-CCNC: 164 U/L (ref 46–116)
ALT SERPL-CCNC: 47 U/L (ref 12–78)
ANION GAP SERPL CALC-SCNC: 9 MMOL/L (ref 5–15)
AST SERPL-CCNC: 46 U/L (ref 15–37)
BILIRUB SERPL-MCNC: 1 MG/DL (ref 0.2–1)
BUN SERPL-MCNC: 62 MG/DL (ref 7–18)
CALCIUM SERPL-MCNC: 7.4 MG/DL (ref 8.5–10.1)
CHLORIDE SERPL-SCNC: 102 MMOL/L (ref 98–107)
CO2 SERPL-SCNC: 26 MMOL/L (ref 21–32)
CREAT SERPL-MCNC: 1.2 MG/DL (ref 0.55–1.3)
ERYTHROCYTE [DISTWIDTH] IN BLOOD BY AUTOMATED COUNT: 12.9 % (ref 11.6–14.8)
GLOBULIN SER-MCNC: 2.9 G/DL
HCT VFR BLD CALC: 24.6 % (ref 37–47)
HGB BLD-MCNC: 8.1 G/DL (ref 12–16)
MCV RBC AUTO: 90 FL (ref 80–99)
PHOSPHATE SERPL-MCNC: 2.7 MG/DL (ref 2.5–4.9)
PLATELET # BLD: 167 K/UL (ref 150–450)
POTASSIUM SERPL-SCNC: 3.4 MMOL/L (ref 3.5–5.1)
RBC # BLD AUTO: 2.74 M/UL (ref 4.2–5.4)
SODIUM SERPL-SCNC: 137 MMOL/L (ref 136–145)
WBC # BLD AUTO: 7.2 K/UL (ref 4.8–10.8)

## 2020-08-19 PROCEDURE — 0DB78ZX EXCISION OF STOMACH, PYLORUS, VIA NATURAL OR ARTIFICIAL OPENING ENDOSCOPIC, DIAGNOSTIC: ICD-10-PCS

## 2020-08-19 RX ADMIN — HUMAN INSULIN SCH MLS/HR: 100 INJECTION, SOLUTION SUBCUTANEOUS at 03:39

## 2020-08-19 RX ADMIN — DEXTROSE MONOHYDRATE SCH MLS/HR: 50 INJECTION, SOLUTION INTRAVENOUS at 20:13

## 2020-08-19 RX ADMIN — DOCUSATE SODIUM SCH MG: 50 LIQUID ORAL at 17:50

## 2020-08-19 RX ADMIN — SUCRALFATE SCH GM: 1 TABLET ORAL at 20:14

## 2020-08-19 RX ADMIN — AMPICILLIN SODIUM SCH MLS/HR: 2 INJECTION, POWDER, FOR SOLUTION INTRAVENOUS at 17:38

## 2020-08-19 RX ADMIN — SUCRALFATE SCH GM: 1 TABLET ORAL at 17:38

## 2020-08-19 RX ADMIN — SUCRALFATE SCH GM: 1 TABLET ORAL at 09:13

## 2020-08-19 RX ADMIN — ACETAMINOPHEN PRN MG: 160 SOLUTION ORAL at 20:14

## 2020-08-19 RX ADMIN — DOCUSATE SODIUM SCH MG: 50 LIQUID ORAL at 09:13

## 2020-08-19 RX ADMIN — MEROPENEM SCH MLS/HR: 500 INJECTION INTRAVENOUS at 20:13

## 2020-08-19 RX ADMIN — AMPICILLIN SODIUM SCH MLS/HR: 2 INJECTION, POWDER, FOR SOLUTION INTRAVENOUS at 10:06

## 2020-08-19 RX ADMIN — PANTOPRAZOLE SODIUM SCH MG: 40 INJECTION, POWDER, FOR SOLUTION INTRAVENOUS at 20:14

## 2020-08-19 RX ADMIN — HUMAN INSULIN SCH MLS/HR: 100 INJECTION, SOLUTION SUBCUTANEOUS at 11:01

## 2020-08-19 RX ADMIN — SUCRALFATE SCH GM: 1 TABLET ORAL at 12:11

## 2020-08-19 RX ADMIN — ACETAMINOPHEN PRN MG: 160 SOLUTION ORAL at 04:10

## 2020-08-19 RX ADMIN — MEROPENEM SCH MLS/HR: 500 INJECTION INTRAVENOUS at 09:13

## 2020-08-19 RX ADMIN — PANTOPRAZOLE SODIUM SCH MG: 40 INJECTION, POWDER, FOR SOLUTION INTRAVENOUS at 09:13

## 2020-08-19 NOTE — PRE-PROCEDURE NOTE/ATTESTATION
Pre-Procedure Note/Attestation


Complete Prior to Procedure


Planned Procedure:  not applicable


Procedure Narrative:


egd





Indications for Procedure


Pre-Operative Diagnosis:


gib





Attestation


I attest that I discussed the nature of the procedure; its benefits; risks and 

complications; and alternatives (and the risks and benefits of such alternatives

), prior to the procedure, with the patient (or the patient's legal 

representative).





I attest that, if there was a reasonable possibility of needing a blood 

transfusion, the patient (or the patient's legal representative) was given the 

[facility] standardized written summary, pursuant 

to the Qasim [employer] Blood Safety Act ([place] Health and Safety Code # 1645, as 

amended).





I attest that I re-evaluated the patient just prior to the surgery and that 

there has been no change in the patient's H&P, except as documented below:











George Mir MD Aug 19, 2020 08:06

## 2020-08-19 NOTE — PROCEDURE NOTE
DATE OF PROCEDURE:  08/19/2020

SURGEON:  George Mir MD.



PROCEDURE:  Upper endoscopy with biopsy.



ANESTHESIA:  Per CRNA, Renée Tarrillion.



INSTRUMENT:  Olympus adult flexible upper endoscope.



INDICATION:  GI bleeding.



REASON FOR PROCEDURE:  The procedure, risks, benefits, and possible

consequences, including hemorrhage, aspiration, perforation and infection,

and alternative treatments, were explained to the patient/legal guardian

by Dr. George Mir and the patient/legal guardian understood and

accepted these risks.



PROCEDURE IN DETAIL:  After informed consent was obtained and the patient

was adequately sedated, Olympus upper endoscope was advanced from mouth

into the second portion of the duodenum and retroflexion was performed in

the stomach.



The patient has evidence of diffuse gastritis.  Random biopsy from

antrum was obtained to rule out H. pylori infection.  Otherwise, the rest

of upper endoscopic examination grossly looked within normal limits.  No

evidence of any ulceration.  No evidence of any active bleeding.  At this

time, the scope was gradually removed and terminated.



SUMMARY OF FINDINGS:  Gastritis, otherwise normal upper endoscopic

examination.



RECOMMENDATIONS:  Follow biopsy results and treat accordingly.









  ______________________________________________

  George Mir M.D. DR:  RODNEY

D:  08/19/2020 08:20

T:  08/19/2020 08:53

JOB#:  [id number]

CC:

## 2020-08-19 NOTE — NUR
NURSE NOTES:

bed bath given linens changed- oral care provided- HOB elevated- pt. appears to be resting 
comfortably in bed- no distress noted- appears to be tolerating current vent settings well- 
will continue to monitor pt. and with plan of care.

## 2020-08-19 NOTE — CARDIAC ELECTROPHYSIOLOGY PN
Assessment/Plan


Assessment/Plan


1. Non-ST elevation myocardial infarction with troponin of 3.2, down to 2 and 

1.9. 


Due to demand ischemia in view hemoglobin of only 6 as


well as renal failure. Her EKG shows sinus rhythm with nonspecific ST-T


wave abnormality and no ST elevation. 





2. Volume overload.  BNP of 19,000 as well as 4+ edema.  However, the


patient is likely intravascularly depleted as her BUN is 112 and


creatinine 1.8.  Improved to 62/1.2. Fu  by Dr. Garcia.





3. Severe hyponatremia, sodium 125.





4. S/P Septic shock with lactic acidosis and Hb 6.  


Got PRBC and is on IV antibiotic per ID. S/P EGD. Colonscopy in am pending





5. 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


6. Dysphagia, status post PEG placement.


7. Acute renal failure.





SAY RN and Dr Casillas





Subjective


Subjective


Remained in atrial fib in 70s.Had katt down to 30s and tachy episodes as well.

  On 35% Fio2 on Vent via Trach.


    EGD today was negative. Colonoscopy now pending tomorrow





Objective





Last 24 Hour Vital Signs








  Date Time  Temp Pulse Resp B/P (MAP) Pulse Ox O2 Delivery O2 Flow Rate FiO2


 


8/19/20 13:05  122 26     35


 


8/19/20 12:00        35


 


8/19/20 12:00  79      


 


8/19/20 12:00 97.5 91 17 134/83 (100) 100   


 


8/19/20 10:44  84 16     35


 


8/19/20 10:11  98 14  98   


 


8/19/20 08:54  86 21     35


 


8/19/20 08:49  100 10  99   


 


8/19/20 08:00        35


 


8/19/20 08:00 98.0 98 18 111/74 (86) 100   


 


8/19/20 07:56  90      


 


8/19/20 07:08  77 16     35


 


8/19/20 06:47 98.1       


 


8/19/20 04:40 98.9       


 


8/19/20 04:32  101 19     35


 


8/19/20 04:00        35


 


8/19/20 04:00      Mechanical Ventilator  


 


8/19/20 04:00 99.6 113 20 144/89 (107) 100   


 


8/19/20 03:35  104      


 


8/19/20 02:35  71 27     35


 


8/19/20 00:33  122 25     35


 


8/19/20 00:00        35


 


8/19/20 00:00 98.2 112 22 131/82 (98) 99   


 


8/19/20 00:00      Mechanical Ventilator  


 


8/18/20 23:33  101      


 


8/18/20 22:51  110 22     35


 


8/18/20 21:07  96 17     35


 


8/18/20 20:00      Mechanical Ventilator  


 


8/18/20 20:00 98.6 99 18 143/78 (99) 100   


 


8/18/20 20:00        35


 


8/18/20 19:24  91      


 


8/18/20 18:30  120 19     35


 


8/18/20 17:00  118 18     35


 


8/18/20 16:00      Mechanical Ventilator  


 


8/18/20 16:00 98.0 98 19 115/61 (79) 100   


 


8/18/20 16:00        35


 


8/18/20 16:00  105      


 


8/18/20 15:16  122 21     35


 


8/18/20 13:20  73 19     35

















Intake and Output  


 


 8/18/20 8/19/20





 19:00 07:00


 


Intake Total 1890.0 ml 1789 ml


 


Output Total 800 ml 550 ml


 


Balance 1090.0 ml 1239 ml


 


  


 


IV Total 1690.0 ml 1789 ml


 


Other 200 ml 


 


Output Urine Total 800 ml 550 ml


 


# Bowel Movements 2 4











Laboratory Tests








Test


  8/19/20


02:50 8/19/20


09:30


 


White Blood Count


  7.2 K/UL


(4.8-10.8) 


 


 


Red Blood Count


  2.74 M/UL


(4.20-5.40)  L 


 


 


Hemoglobin


  8.1 G/DL


(12.0-16.0)  L 


 


 


Hematocrit


  24.6 %


(37.0-47.0)  L 


 


 


Mean Corpuscular Volume 90 FL (80-99)   


 


Mean Corpuscular Hemoglobin


  29.7 PG


(27.0-31.0) 


 


 


Mean Corpuscular Hemoglobin


Concent 33.0 G/DL


(32.0-36.0) 


 


 


Red Cell Distribution Width


  12.9 %


(11.6-14.8) 


 


 


Platelet Count


  167 K/UL


(150-450) 


 


 


Mean Platelet Volume


  6.9 FL


(6.5-10.1) 


 


 


Neutrophils (%) (Auto)


  % (45.0-75.0)


  


 


 


Lymphocytes (%) (Auto)


  % (20.0-45.0)


  


 


 


Monocytes (%) (Auto)  % (1.0-10.0)   


 


Eosinophils (%) (Auto)  % (0.0-3.0)   


 


Basophils (%) (Auto)  % (0.0-2.0)   


 


Differential Total Cells


Counted 100  


  


 


 


Neutrophils % (Manual) 87 % (45-75)  H 


 


Lymphocytes % (Manual) 4 % (20-45)  L 


 


Monocytes % (Manual) 7 % (1-10)   


 


Eosinophils % (Manual) 1 % (0-3)   


 


Basophils % (Manual) 1 % (0-2)   


 


Band Neutrophils 0 % (0-8)   


 


Platelet Estimate Adequate   


 


Platelet Morphology Normal   


 


Hypochromasia 1+   


 


Sodium Level


  137 MMOL/L


(136-145) 


 


 


Potassium Level


  3.4 MMOL/L


(3.5-5.1)  L 


 


 


Chloride Level


  102 MMOL/L


() 


 


 


Carbon Dioxide Level


  26 MMOL/L


(21-32) 


 


 


Anion Gap


  9 mmol/L


(5-15) 


 


 


Blood Urea Nitrogen


  62 mg/dL


(7-18)  H 


 


 


Creatinine


  1.2 MG/DL


(0.55-1.30) 


 


 


Estimat Glomerular Filtration


Rate 42.5 mL/min


(>60) 


 


 


Glucose Level


  117 MG/DL


()  H 


 


 


Calcium Level


  7.4 MG/DL


(8.5-10.1)  L 


 


 


Phosphorus Level


  2.7 MG/DL


(2.5-4.9) 


 


 


Magnesium Level


  2.1 MG/DL


(1.8-2.4) 


 


 


Total Bilirubin


  1.0 MG/DL


(0.2-1.0) 


 


 


Aspartate Amino Transf


(AST/SGOT) 46 U/L (15-37)


H 


 


 


Alanine Aminotransferase


(ALT/SGPT) 47 U/L (12-78)


  


 


 


Alkaline Phosphatase


  164 U/L


()  H 


 


 


Total Protein


  5.2 G/DL


(6.4-8.2)  L 


 


 


Albumin


  2.3 G/DL


(3.4-5.0)  L 


 


 


Globulin 2.9 g/dL   


 


Albumin/Globulin Ratio


  0.8 (1.0-2.7)


L 


 


 


Cortisol AM Sample Pending   


 


Stool Occult Blood  Pending  











Microbiology








 Date/Time


Source Procedure


Growth Status


 


 


 8/17/20 09:05


Nasopharynx SARS-CoV-2 RdRp Gene Assay - Final Complete


 


 8/17/20 15:00


Stool Clostridium difficile Toxin Assay - Final Complete








Objective





HEAD AND NECK:  Status post tracheostomy, which is fresh.


LUNGS:  Coarse rhonchi.


CARDIOVASCULAR:  Regular S1 and S2 with no gallop.


ABDOMEN:  Status post G-tube.


EXTREMITIES:   3+ pitting edema.











Antione Redding MD Aug 19, 2020 13:20

## 2020-08-19 NOTE — PULMONOLGY CRITICAL CARE NOTE
Critical Care - Asmt/Plan


Problems:  


(1) ICH (intracerebral hemorrhage)


(2) Chronic vegetative state


(3) Feeding by G-tube


(4) Chronic respiratory failure


(5) Hyponatremia


Respiratory:  monitor respiratory rate, adjust FIO2, CXR


Cardiac:  continue pressors, continue to monitor HR/BP


Renal:  F/U I&O, keep IV fluid, check electrolytes


Gastrointestinal:  continue feedings/current rate


Endocrine:  monitor blood sugar, continue sliding scale insulin


Hematologic:  monitor H/H, transfuse if hgb<8.5


Neurologic:  PRN Ativan, keep patient comfortable


Affect:  PRN ativan


Prophylaxis:  Heparin


Time Spent (Minutes):  40


Notes Reviewed:  cardio, renal


Discussed with:  nurses, consultants, 





Critical Care - Objective





Last 24 Hour Vital Signs








  Date Time  Temp Pulse Resp B/P (MAP) Pulse Ox O2 Delivery O2 Flow Rate FiO2


 


8/19/20 10:44  84 16     35


 


8/19/20 10:11  98 14  98   


 


8/19/20 08:54  86 21     35


 


8/19/20 08:49  100 10  99   


 


8/19/20 08:00        35


 


8/19/20 08:00 98.0 98 18 111/74 (86) 100   


 


8/19/20 07:56  90      


 


8/19/20 07:08  77 16     35


 


8/19/20 06:47 98.1       


 


8/19/20 04:40 98.9       


 


8/19/20 04:32  101 19     35


 


8/19/20 04:00        35


 


8/19/20 04:00      Mechanical Ventilator  


 


8/19/20 04:00 99.6 113 20 144/89 (107) 100   


 


8/19/20 03:35  104      


 


8/19/20 02:35  71 27     35


 


8/19/20 00:33  122 25     35


 


8/19/20 00:00        35


 


8/19/20 00:00 98.2 112 22 131/82 (98) 99   


 


8/19/20 00:00      Mechanical Ventilator  


 


8/18/20 23:33  101      


 


8/18/20 22:51  110 22     35


 


8/18/20 21:07  96 17     35


 


8/18/20 20:00      Mechanical Ventilator  


 


8/18/20 20:00 98.6 99 18 143/78 (99) 100   


 


8/18/20 20:00        35


 


8/18/20 19:24  91      


 


8/18/20 18:30  120 19     35


 


8/18/20 17:00  118 18     35


 


8/18/20 16:00      Mechanical Ventilator  


 


8/18/20 16:00 98.0 98 19 115/61 (79) 100   


 


8/18/20 16:00        35


 


8/18/20 16:00  105      


 


8/18/20 15:16  122 21     35


 


8/18/20 13:20  73 19     35


 


8/18/20 12:00 98.0 104 19 115/70 (85) 100   


 


8/18/20 12:00      Mechanical Ventilator  


 


8/18/20 12:00  94      


 


8/18/20 12:00        35








Status:  sedated


Condition:  critical


HEENT:  atraumatic, normocephalic


Lungs:  clear


Heart:  HR/BP stable


Abdomen:  soft, non-tender


Extremities:  no C/C/E


Decubiti:  location


Micro:





Microbiology








 Date/Time


Source Procedure


Growth Status


 


 


 8/16/20 12:15


Blood Blood Culture - Preliminary


YEAST Resulted


 


 8/16/20 12:00


Blood Blood Culture - Final


Enterococcus Faecalis Complete


 


 8/17/20 09:05


Nasopharynx SARS-CoV-2 RdRp Gene Assay - Final Complete


 


 8/16/20 12:00


Nasal Nares MRSA Culture - Final


NO METHICILLIN RESISTANT STAPH AUREUS... Complete


 


 8/17/20 15:00


Stool Clostridium difficile Toxin Assay - Final Complete


 


 8/16/20 12:00


Rectum - Final


NO CARBAPENEM-RESISTANT ENTEROBACTERI... Complete


 


 8/16/20 12:00


Rectum VRE Culture - Final


Enterococcus Faecium - Vre Complete











Critical Care - Subjective


ROS Limited/Unobtainable:  Yes


Condition:  critical


EKG Rhythm:  Sinus Rhythm


FI02:  35


Vent Support Breath Rate:  10


Vent Support Mode:  AC


Vent Tidal Volume:  400


Sputum Amount:  Small


PEEP:  5.0


PIP:  33


I&O:











Intake and Output  


 


 8/18/20 8/19/20





 19:00 07:00


 


Intake Total 1890.0 ml 1689 ml


 


Output Total 800 ml 550 ml


 


Balance 1090.0 ml 1139 ml


 


  


 


IV Total 1690.0 ml 1689 ml


 


Other 200 ml 


 


Output Urine Total 800 ml 550 ml


 


# Bowel Movements 2 4








CXR:


worsening infiltrate and /or edema


Labs:





Laboratory Tests








Test


  8/19/20


02:50 8/19/20


09:30


 


White Blood Count


  7.2 K/UL


(4.8-10.8) 


 


 


Red Blood Count


  2.74 M/UL


(4.20-5.40)  L 


 


 


Hemoglobin


  8.1 G/DL


(12.0-16.0)  L 


 


 


Hematocrit


  24.6 %


(37.0-47.0)  L 


 


 


Mean Corpuscular Volume 90 FL (80-99)   


 


Mean Corpuscular Hemoglobin


  29.7 PG


(27.0-31.0) 


 


 


Mean Corpuscular Hemoglobin


Concent 33.0 G/DL


(32.0-36.0) 


 


 


Red Cell Distribution Width


  12.9 %


(11.6-14.8) 


 


 


Platelet Count


  167 K/UL


(150-450) 


 


 


Mean Platelet Volume


  6.9 FL


(6.5-10.1) 


 


 


Neutrophils (%) (Auto)


  % (45.0-75.0)


  


 


 


Lymphocytes (%) (Auto)


  % (20.0-45.0)


  


 


 


Monocytes (%) (Auto)  % (1.0-10.0)   


 


Eosinophils (%) (Auto)  % (0.0-3.0)   


 


Basophils (%) (Auto)  % (0.0-2.0)   


 


Differential Total Cells


Counted 100  


  


 


 


Neutrophils % (Manual) 87 % (45-75)  H 


 


Lymphocytes % (Manual) 4 % (20-45)  L 


 


Monocytes % (Manual) 7 % (1-10)   


 


Eosinophils % (Manual) 1 % (0-3)   


 


Basophils % (Manual) 1 % (0-2)   


 


Band Neutrophils 0 % (0-8)   


 


Platelet Estimate Adequate   


 


Platelet Morphology Normal   


 


Hypochromasia 1+   


 


Sodium Level


  137 MMOL/L


(136-145) 


 


 


Potassium Level


  3.4 MMOL/L


(3.5-5.1)  L 


 


 


Chloride Level


  102 MMOL/L


() 


 


 


Carbon Dioxide Level


  26 MMOL/L


(21-32) 


 


 


Anion Gap


  9 mmol/L


(5-15) 


 


 


Blood Urea Nitrogen


  62 mg/dL


(7-18)  H 


 


 


Creatinine


  1.2 MG/DL


(0.55-1.30) 


 


 


Estimat Glomerular Filtration


Rate 42.5 mL/min


(>60) 


 


 


Glucose Level


  117 MG/DL


()  H 


 


 


Calcium Level


  7.4 MG/DL


(8.5-10.1)  L 


 


 


Phosphorus Level


  2.7 MG/DL


(2.5-4.9) 


 


 


Magnesium Level


  2.1 MG/DL


(1.8-2.4) 


 


 


Total Bilirubin


  1.0 MG/DL


(0.2-1.0) 


 


 


Aspartate Amino Transf


(AST/SGOT) 46 U/L (15-37)


H 


 


 


Alanine Aminotransferase


(ALT/SGPT) 47 U/L (12-78)


  


 


 


Alkaline Phosphatase


  164 U/L


()  H 


 


 


Total Protein


  5.2 G/DL


(6.4-8.2)  L 


 


 


Albumin


  2.3 G/DL


(3.4-5.0)  L 


 


 


Globulin 2.9 g/dL   


 


Albumin/Globulin Ratio


  0.8 (1.0-2.7)


L 


 


 


Cortisol AM Sample Pending   


 


Stool Occult Blood  Pending  

















Shiela Causey MD Aug 19, 2020 11:52

## 2020-08-19 NOTE — 48 HOUR POST ANESTHESIA EVAL
Post Anesthesia Evaluation


Procedure:  EGD


Date of Evaluation:  Aug 19, 2020


Time of Evaluation:  10:10


Blood Pressure Systolic:  135


0:  70


Pulse Rate:  98


Respiratory Rate:  14


O2 Sat by Pulse Oximetry:  98


Airway:  other - Mech Ventilated with trach


Nausea:  No


Vomiting:  No


Hydration Status:  adequate


Cardiopulmonary Status:


stable


Mental Status/LOC:  patient returned to baseline


Post-Anesthesia Complications:


none


Follow-up care needed:  N/A











Renée Bills CRNA Aug 19, 2020 10:11

## 2020-08-19 NOTE — ANETHESIA PREOPERATIVE EVAL
Anesthesia Pre-op PMH/ROS


General


Date of Evaluation:  Aug 19, 2020


Time of Evaluation:  07:00


Anesthesiologist:  leslee


ASA Score:  ASA 4


Mallampati Score


Class I : Soft palate, uvula, fauces, pillars visible


Class II: Soft palate, uvula, fauces visible


Class III: Soft palate, base of uvula visible


Class IV: Only hard plate visible


Mallampati Classification:  Class III


Surgeon:  Clrake


Diagnosis:  Anemia


Surgical Procedure:  EGD


Anesthesia History:  none


Family History:  no anesthesia problems


Allergies:  


Coded Allergies:  


     No Known Allergies (Unverified , 8/16/20)


Medications:  see eMAR


Patient NPO?:  Yes


NPO Date:  Aug 19, 2020


NPO Time:  00:01





Past Medical History


Cardiovascular:  Reports: HTN, CAD, MI, arrhythmia - afib; 


   Denies: valve dz, other


Pulmonary:  Reports: other - Resp Failure s/p trach; 


   Denies: asthma, COPD, [place]


Gastrointestinal/Genitourinary:  Reports: GERD, CRI


Neurologic/Psychiatric:  Reports: dementia, other - Hx of ICH; 


   Denies: CVA, depression/anxiety, TIA


Endocrine:  Denies: DM, hypothyroidism, steroids, other


HEENT:  Denies: cataract (L), cataract (R), glaucoma, [place] (L), [place] (R), other


Hematology/Immune:  Reports: anemia, other - GI bleed; 


   Denies: DVT, bleeding disorder


Musculoskeletal/Integumentary:  Denies: OA, RA, DJD, DDD, edema, other


PMH Narrative:





# Anemia rule out underlying gi bleed





# NSTEMI (non-ST elevated myocardial infarction) with trop elevation


-


# XUAN (acute kidney injury)


--> cr 1.8-->1.5


# Hyponatremia/Hypokalemoa





# Respiratory failure s/p trach





# PNA on cxr





# Dysphagia s/p peg





Anesthesia Pre-op Phys. Exam


Physician Exam





Last Vital Signs








  Date Time  Temp Pulse Resp B/P (MAP) Pulse Ox O2 Delivery O2 Flow Rate FiO2


 


8/19/20 06:47 98.1       


 


8/19/20 04:32  101 19     35


 


8/19/20 04:00      Mechanical Ventilator  


 


8/19/20 04:00    144/89 (107) 100   








Constitutional:  other - weak, demented


Neurologic:  other - dementia


Cardiovascular:  other - afib


Respiratory:  CTA


Gastrointestinal:  S/NT/ND





Airway Exam


Mallampati Score:  Class III


MO:  limited


ROM:  limited


Dentures:  no upper, no lower





Anesthesia Pre-op A/P


Labs





Hematology








Test


  8/19/20


02:50


 


White Blood Count


  7.2 K/UL


(4.8-10.8)


 


Red Blood Count


  2.74 M/UL


(4.20-5.40)  L


 


Hemoglobin


  8.1 G/DL


(12.0-16.0)  L


 


Hematocrit


  24.6 %


(37.0-47.0)  L


 


Mean Corpuscular Volume 90 FL (80-99)  


 


Mean Corpuscular Hemoglobin


  29.7 PG


(27.0-31.0)


 


Mean Corpuscular Hemoglobin


Concent 33.0 G/DL


(32.0-36.0)


 


Red Cell Distribution Width


  12.9 %


(11.6-14.8)


 


Platelet Count


  167 K/UL


(150-450)


 


Mean Platelet Volume


  6.9 FL


(6.5-10.1)


 


Neutrophils (%) (Auto)


  % (45.0-75.0)


 


 


Lymphocytes (%) (Auto)


  % (20.0-45.0)


 


 


Monocytes (%) (Auto)  % (1.0-10.0)  


 


Eosinophils (%) (Auto)  % (0.0-3.0)  


 


Basophils (%) (Auto)  % (0.0-2.0)  


 


Neutrophils % (Manual) Pending  


 


Lymphocytes % (Manual) Pending  


 


Platelet Estimate Pending  


 


Platelet Morphology Pending  








Chemistry








Test


  8/19/20


02:50


 


Sodium Level


  137 MMOL/L


(136-145)


 


Potassium Level


  3.4 MMOL/L


(3.5-5.1)  L


 


Chloride Level


  102 MMOL/L


()


 


Carbon Dioxide Level


  26 MMOL/L


(21-32)


 


Anion Gap


  9 mmol/L


(5-15)


 


Blood Urea Nitrogen


  62 mg/dL


(7-18)  H


 


Creatinine


  1.2 MG/DL


(0.55-1.30)


 


Estimat Glomerular Filtration


Rate 42.5 mL/min


(>60)


 


Glucose Level


  117 MG/DL


()  H


 


Calcium Level


  7.4 MG/DL


(8.5-10.1)  L


 


Phosphorus Level


  2.7 MG/DL


(2.5-4.9)


 


Magnesium Level


  2.1 MG/DL


(1.8-2.4)


 


Total Bilirubin


  1.0 MG/DL


(0.2-1.0)


 


Aspartate Amino Transf


(AST/SGOT) 46 U/L (15-37)


H


 


Alanine Aminotransferase


(ALT/SGPT) 47 U/L (12-78)


 


 


Alkaline Phosphatase


  164 U/L


()  H


 


Total Protein


  5.2 G/DL


(6.4-8.2)  L


 


Albumin


  2.3 G/DL


(3.4-5.0)  L


 


Globulin 2.9 g/dL  


 


Albumin/Globulin Ratio


  0.8 (1.0-2.7)


L


 


Cortisol AM Sample Pending  











Studies


Pre-op Studies:  EKG - Afib





Risk Assessment & Plan


Assessment:


covid neg


Plan:


MAC


Status Change Before Surgery:  No





Pre-Antibiotics


Drug:  none











Renée Bills CRNA Aug 19, 2020 07:12

## 2020-08-19 NOTE — NUR
NURSE NOTES:

Pt received from ROSALBA Hernandez in no acute distress. Pt noted obtunded, trache to vent, 
shiley 8 AC 10  FiO2 35% Peep5. Pt is Afib to cardiac monitor. Abd is round and soft 
with clamped GT noted- pt currently NPO. Dr Mir and GI personnel currently at bedside 
preparing for EGD procedure. Santizo noted draining yellow urine. Skin alterations noted. Pt 
has a LH 22g IV running D5NS at 100 cc/hr and a RH 22g IV saline-locked. Potassium level 
noted today- will f/u w/ Dr Garcia for replacement. Bed in lowest position, alarm on, 
side rails up x 3, call light within reach. Will continue to monitor.

## 2020-08-19 NOTE — NUR
CASE MANAGEMENT: REVIEW





SI: ANEMIA . XUAN . PNA 

T 97.5  RR 26 /83 SAT 98% MECH VENT FIO2 35

WBC 8.1/24.6 K 3.4 BUN 62 





IS: LASIX IV QD

K-DUR 40MEQ 

MICAFUNGIN IV Q24HR

MEROPENEM IV Q12HR

PROTONIX IV Q12HR

D5 NS IVF @ 100ML/HR 

COLONOSCOPY PENDING 





***STEP DOWN UNIT***

DCP: PATIENT IS FROM [facility]

## 2020-08-19 NOTE — INFECTIOUS DISEASES PROG NOTE
Assessment/Plan





86yo F with:





Hypotension in setting of anemia to 6.0


Normal WBC


Afebrile, Tmax 99.7 --> Febrile to 100.4


Fungemia


   8/16 BCx +yeast


   8/18 R fem CVC removed


   8/16 TTE wo apparent vegetations


GPC Bacteremia


   8/16 BCx 1/2 +E.faecalis (S-amp)


   MRSA nares neg


Possible UTI


   8/16 UA+, UCx >100k ESBL Kleb pna (R-levo @1)


Possible pna, vent dependent via trach


   8/16 CXR: Patchy opacities throughout the lungs which may represent 

pulmonary edema versus infectious/inflammatory process. Possible small pleural 

effusions.


   8/16 COVID rapid Ag neg x1


   8/17 COVID rapid Ag neg x1


Volume OL? BNP 16,470


NSTEMI, troponin 2.1


XUAN on CKD, Cr 1.8, improving





R/o C.dif neg 8/17





Vent dependent, FiO2 35%


S/p trach 5 days pta


H/o GIB


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





PMH: GIB, ICH, trach and vent dependent, HTN, GERD





Plan:


Stop vanco #1 


Start ampicillin #1 (abx day #4/14) for E.faecalis bacteremia


Cont micafungin #2 for fungemia


Cont meropenem #2/3 (abx day #4/5) for ESBL Kleb in UCx


   8/18 SP Zosyn #2





?Source of polymicrobial bacteremia/fungemia, likely gut source given organisms


Going for colonoscopy tomorrow, if this is unremarkable then will need CT A/P w 

con to eval for source of bacteremia/fungemia





Repeat BCx to ensure clearance today and tomorrow morning


Ensure all lines removed, PIVs exchanged





F/u BCx


Trend XUAN, improving





Monitor CBC/CMP


Monitor resp status


Monitor temp curve





D/w RN





Thank you for this consult. [facility] will continue to follow.





Subjective


Allergies:  


Coded Allergies:  


     No Known Allergies (Unverified , 8/16/20)





AF


On vent, FiO2 35% satting 99%


NAD





Objective





Last 24 Hour Vital Signs








  Date Time  Temp Pulse Resp B/P (MAP) Pulse Ox O2 Delivery O2 Flow Rate FiO2


 


8/19/20 06:47 98.1       


 


8/19/20 04:40 98.9       


 


8/19/20 04:32  101 19     35


 


8/19/20 04:00        35


 


8/19/20 04:00      Mechanical Ventilator  


 


8/19/20 04:00 99.6 113 20 144/89 (107) 100   


 


8/19/20 03:35  104      


 


8/19/20 02:35  71 27     35


 


8/19/20 00:33  122 25     35


 


8/19/20 00:00        35


 


8/19/20 00:00 98.2 112 22 131/82 (98) 99   


 


8/19/20 00:00      Mechanical Ventilator  


 


8/18/20 23:33  101      


 


8/18/20 22:51  110 22     35


 


8/18/20 21:07  96 17     35


 


8/18/20 20:00      Mechanical Ventilator  


 


8/18/20 20:00 98.6 99 18 143/78 (99) 100   


 


8/18/20 20:00        35


 


8/18/20 19:24  91      


 


8/18/20 18:30  120 19     35


 


8/18/20 17:00  118 18     35


 


8/18/20 16:00      Mechanical Ventilator  


 


8/18/20 16:00 98.0 98 19 115/61 (79) 100   


 


8/18/20 16:00        35


 


8/18/20 16:00  105      


 


8/18/20 15:16  122 21     35


 


8/18/20 13:20  73 19     35


 


8/18/20 12:00 98.0 104 19 115/70 (85) 100   


 


8/18/20 12:00      Mechanical Ventilator  


 


8/18/20 12:00  94      


 


8/18/20 12:00        35


 


8/18/20 10:30  120 19     35


 


8/18/20 09:00  94 17     35


 


8/18/20 08:00        35


 


8/18/20 08:00      Mechanical Ventilator  


 


8/18/20 08:00  94      


 


8/18/20 07:47 97.9 94 19 100/77 (85) 99   








Height (Feet):  5


Height (Inches):  2.00


Weight (Pounds):  155





Gen: Older woman, on vent, NAD


HEENT: Trach


CV: RRR


Pulm: CTAB on vent


Abd: Soft, NTND, +PEG


Neuro: Non-responsive


Lines: R fem CVC removed; PIVs in BL hands (new)





Microbiology








 Date/Time


Source Procedure


Growth Status


 


 


 8/16/20 12:15


Blood Blood Culture - Preliminary


YEAST Resulted


 


 8/16/20 12:00


Blood Blood Culture - Final


Enterococcus Faecalis Complete


 


 8/17/20 09:05


Nasopharynx SARS-CoV-2 RdRp Gene Assay - Final Complete


 


 8/16/20 12:00


Nasal Nares MRSA Culture - Final


NO METHICILLIN RESISTANT STAPH AUREUS... Complete


 


 8/16/20 10:30


Nasopharynx SARS-CoV-2 RdRp Gene Assay - Final Complete


 


 8/17/20 15:00


Stool Clostridium difficile Toxin Assay - Final Complete


 


 8/16/20 10:50


Urine,Clean Catch Urine Culture - Final


Klebsiella Pneumoniae Esbl Complete


 


 8/16/20 12:00


Rectum - Final


NO CARBAPENEM-RESISTANT ENTEROBACTERI... Complete


 


 8/16/20 12:00


Rectum VRE Culture - Final


Enterococcus Faecium - Vre Complete











Laboratory Tests








Test


  8/19/20


02:50


 


White Blood Count


  7.2 K/UL


(4.8-10.8)


 


Red Blood Count


  2.74 M/UL


(4.20-5.40)  L


 


Hemoglobin


  8.1 G/DL


(12.0-16.0)  L


 


Hematocrit


  24.6 %


(37.0-47.0)  L


 


Mean Corpuscular Volume 90 FL (80-99)  


 


Mean Corpuscular Hemoglobin


  29.7 PG


(27.0-31.0)


 


Mean Corpuscular Hemoglobin


Concent 33.0 G/DL


(32.0-36.0)


 


Red Cell Distribution Width


  12.9 %


(11.6-14.8)


 


Platelet Count


  167 K/UL


(150-450)


 


Mean Platelet Volume


  6.9 FL


(6.5-10.1)


 


Neutrophils (%) (Auto)


  % (45.0-75.0)


 


 


Lymphocytes (%) (Auto)


  % (20.0-45.0)


 


 


Monocytes (%) (Auto)  % (1.0-10.0)  


 


Eosinophils (%) (Auto)  % (0.0-3.0)  


 


Basophils (%) (Auto)  % (0.0-2.0)  


 


Neutrophils % (Manual) Pending  


 


Lymphocytes % (Manual) Pending  


 


Platelet Estimate Pending  


 


Platelet Morphology Pending  


 


Sodium Level


  137 MMOL/L


(136-145)


 


Potassium Level


  3.4 MMOL/L


(3.5-5.1)  L


 


Chloride Level


  102 MMOL/L


()


 


Carbon Dioxide Level


  26 MMOL/L


(21-32)


 


Anion Gap


  9 mmol/L


(5-15)


 


Blood Urea Nitrogen


  62 mg/dL


(7-18)  H


 


Creatinine


  1.2 MG/DL


(0.55-1.30)


 


Estimat Glomerular Filtration


Rate 42.5 mL/min


(>60)


 


Glucose Level


  117 MG/DL


()  H


 


Calcium Level


  7.4 MG/DL


(8.5-10.1)  L


 


Phosphorus Level


  2.7 MG/DL


(2.5-4.9)


 


Magnesium Level


  2.1 MG/DL


(1.8-2.4)


 


Total Bilirubin


  1.0 MG/DL


(0.2-1.0)


 


Aspartate Amino Transf


(AST/SGOT) 46 U/L (15-37)


H


 


Alanine Aminotransferase


(ALT/SGPT) 47 U/L (12-78)


 


 


Alkaline Phosphatase


  164 U/L


()  H


 


Total Protein


  5.2 G/DL


(6.4-8.2)  L


 


Albumin


  2.3 G/DL


(3.4-5.0)  L


 


Globulin 2.9 g/dL  


 


Albumin/Globulin Ratio


  0.8 (1.0-2.7)


L


 


Cortisol AM Sample Pending  











Current Medications








 Medications


  (Trade)  Dose


 Ordered  Sig/Lisa


 Route


 PRN Reason  Start Time


 Stop Time Status Last Admin


Dose Admin


 


 Acetaminophen


  (Tylenol)  650 mg  Q6H  PRN


 GT


 For Headache  8/17/20 20:30


 9/16/20 20:29  8/19/20 04:10


 


 


 Dextrose/Sodium


 Chloride  1,000 ml @ 


 100 mls/hr  Q10H


 IV


   8/16/20 16:00


 9/15/20 15:59  8/19/20 03:39


 


 


 Docusate Sodium


  (Colace)  100 mg  TWICE A  DAY


 NG


   8/17/20 18:00


 9/16/20 17:59   


 


 


 Meropenem 500 mg/


 Sodium Chloride  55 ml @ 


 110 mls/hr  EVERY 12  HOURS


 IVPB


   8/18/20 13:00


 8/23/20 12:59  8/18/20 20:30


 


 


 Micafungin Sodium


 100 mg/Sodium


 Chloride  110 ml @ 


 110 mls/hr  Q24H


 IVPB


   8/18/20 20:00


 8/25/20 19:59  8/18/20 20:24


 


 


 Pantoprazole


  (Protonix)  40 mg  EVERY 12  HOURS


 IVP


   8/16/20 21:00


 9/15/20 20:59  8/18/20 20:24


 


 


 Sucralfate


  (Carafate)  1 gm  FOUR TIMES A  DAY


 GT


   8/16/20 18:00


 11/14/20 17:59  8/18/20 20:24


 


 


 Vancomycin HCl


  (Vanco pharmacy


 to dose)  1 ea  DAILY  PRN


 MISC


 Per rx protocol  8/17/20 16:45


 9/16/20 16:44   


 


 


 Vancomycin HCl


 500 mg/Dextrose  110 ml @ 


 110 mls/hr  Q24H


 IVPB


   8/18/20 21:00


 8/23/20 20:59  8/18/20 20:24


 

















Vianney Feldman M.D. Aug 19, 2020 07:09

## 2020-08-19 NOTE — NUR
NURSE NOTES:

Received report from ROSALBA Granados.

A/O x 0. Obtunded and unable to make needs known.

s2 s3 heart sounds. EKG shows Afib with bpm of 134. Bilateral radial pulses bounding. +3 
pitting edema on all bilateral extremities and >3 cap refill.

Crackles heard throughout bilateral lower lobes upon auscultation.

G-tube site intact with 20 mL residual. no drainage noted. abdomen distended and bowel prep 
initiated for colonoscopy tomorrow.

Left and right hand IV intact.

Santizo draining well to gravity.

Bed left in lowest and locked position. bed alarm on and call light within reach. will 
continue monitoring. 

-------------------------------------------------------------------------------

Addendum: 08/20/20 at 0150 by Yaneth Manrique RN

-------------------------------------------------------------------------------

NURSE NOTES:

s1s2 heart sounds heart throughout APETM points. Pt in stable condition with no respiratory 
or cardiac distress.

## 2020-08-19 NOTE — NUR
NURSE HAND-OFF REPORT: 



Important Events on Shift: EGD performed today. Pt currently NPO pending colonoscopy 
procedure tomorrow morning- Bowel prep initiated. Potassium was replaced per Dr Garcia. 
IVF rate was reduced d/t pitting edema. 

Patient Status: Pt is stable - no acute events occured today- Dr Redding aware of cardiac 
rhythm (controlled Afib) - no pharmacological DVT prophylaxis at this time d/t active GI 
bleed (OBS +) - venous duplex results confirmed negative today - pt placed on SCDs to both 
lower extremities. 

Diet: currently NPO - will resume TF after colonoscopy tomorrow per Dr Mir



Pending Orders: ABD CT scheduled for tomorrow (if colonoscopy results are WNL) per Dr Feldman. 

Pending Results/Labs: Blood culture and 2nd OBS sent today. 

Pending MD notification: N



Latest Vital Signs: Temperature 98.2 , Pulse 115 , B/P 120 /96 , Respiratory Rate 26 , O2 
 , Mechanical Ventilator (via trahce) , FiO2 35%.  

Vital Sign Comment: stable



EKG Rhythm: Atrial Fibrillation

Rhythm change?: N 

MD Notified?: Dr Redding aware

MD Response: n/a



Latest Wilkinson Fall Score: 70  

Fall Risk: High Risk 

Safety Measures: Call light Within Reach, Bed Alarm Zone 1, Side Rails Side Rails x3, Bed 
position Low and Locked.

Fall Precautions: 

Yellow Socks

Yellow Gown

Patient Fall Education



Report given to ROSALBA Smith.

## 2020-08-19 NOTE — NUR
NURSE NOTES:

Spoke to Dr Feldman regarding CT scan scheduled for today- per Dr Feldman, OK to reschedule ABD CT 
Scan after colonoscopy procedure tomorrow (if colonoscopy results are normal)- Ray from CT 
dept notified.

## 2020-08-19 NOTE — NUR
NURSE NOTES:

Spoke with Dr Causey- received order for venous duplex to bilateral lwoer extremities to 
r/o DVT- OK to place pt on SCDs if venous duplex results are negative.

## 2020-08-19 NOTE — HEMATOLOGY/ONC PROGRESS NOTE
Assessment/Plan


Assessment/Plan





Assessment and Recs


# Anemia rule out underlying gi bleed


--> anemia panel has been ordered, esr, ferritin, tibc, occult


--> s/p transfusion prbc 8/16


--> gi service if h/h downtrends


--> triple lumen has been placed


--> hgb 7.7-->8.1


# NSTEMI (non-ST elevated myocardial infarction) with trop elevation


-> on bb, off asa


--> seen by Dr. Redding


--> volume management as per cards/renal


--> trop downtrended


# XUAN (acute kidney injury)


--> cr 1.8-->1.5


# Hyponatremia/Hypokalemoa


--> per renal recs


# Respiratory failure s/p trach


--> consulted pulm


# PNA on cxr


--> as per id recs


--> ABX amp/micaf/mahi


# Dysphagia s/p peg


# Dvt ppx scds





Appreciate consultant care, will follow





Subjective


Constitutional:  Denies: no symptoms, chills, fever, malaise, weakness, other


HEENT:  Denies: no symptoms, eye pain, blurred vision, tearing, double vision, 

ear pain, ear discharge, nose pain, nose congestion, throat pain, throat 

swelling, mouth pain, mouth swelling, other


Cardiovascular:  Denies: no symptoms, chest pain, edema, irregular heart rate, 

lightheadedness, palpitations, syncope, other


Respiratory:  Denies: no symptoms, cough, shortness of breath, SOB with 

excertion, SOB at rest, sputum, wheezing, other


Gastrointestinal/Abdominal:  Denies: no symptoms, abdomen distended, abdominal 

pain, black stools, tarry stools, blood in stool, constipated, diarrhea, 

difficulty swallowing, nausea, poor appetite, poor fluid intake, rectal bleeding

, vomiting, other


Genitourinary:  Denies: no symptoms, burning, discharge, frequency, flank pain, 

hematuria, incontinence, pain, urgency, other


Neurologic/Psychiatric:  Denies: no symptoms, anxiety, depressed, emotional 

problems, headache, numbness, paresthesia, pre-existing deficit, seizure, 

tingling, tremors, weakness, other


Endocrine:  Denies: no symptoms, excessive sweating, flushing, intolerance to 

cold, intolerance to heat, increased hunger, increased thirst, increased urine, 

unexplained weight gain, unexplained weight loss, other


Hematologic/Lymphatic:  Denies: no symptoms, anemia, easy bleeding, easy 

bruising, adenopathy, other


Allergies:  


Coded Allergies:  


     No Known Allergies (Unverified , 8/16/20)


Subjective


8/18 remains obtunded, iraj rn, labs are noted, on vanc/zosyn, mild temp overnight


8/19 no bleeding or chills, on abx, remains confused, no night swaets





Objective


Objective





Current Medications








 Medications


  (Trade)  Dose


 Ordered  Sig/Lisa


 Route


 PRN Reason  Start Time


 Stop Time Status Last Admin


Dose Admin


 


 Acetaminophen


  (Tylenol)  650 mg  Q6H  PRN


 GT


 For Headache  8/17/20 20:30


 9/16/20 20:29  8/19/20 04:10


 


 


 Ampicillin 2 gm/


 Sodium Chloride  110 ml @ 


 220 mls/hr  EVERY 6  HOURS


 IVPB


   8/19/20 07:15


 8/26/20 07:14 UNV  


 


 


 Dextrose/Sodium


 Chloride  1,000 ml @ 


 100 mls/hr  Q10H


 IV


   8/16/20 16:00


 9/15/20 15:59  8/19/20 03:39


 


 


 Docusate Sodium


  (Colace)  100 mg  TWICE A  DAY


 NG


   8/17/20 18:00


 9/16/20 17:59   


 


 


 Meropenem 500 mg/


 Sodium Chloride  55 ml @ 


 110 mls/hr  EVERY 12  HOURS


 IVPB


   8/18/20 13:00


 8/23/20 12:59  8/18/20 20:30


 


 


 Micafungin Sodium


 100 mg/Sodium


 Chloride  110 ml @ 


 110 mls/hr  Q24H


 IVPB


   8/18/20 20:00


 8/25/20 19:59  8/18/20 20:24


 


 


 Pantoprazole


  (Protonix)  40 mg  EVERY 12  HOURS


 IVP


   8/16/20 21:00


 9/15/20 20:59  8/18/20 20:24


 


 


 Sucralfate


  (Carafate)  1 gm  FOUR TIMES A  DAY


 GT


   8/16/20 18:00


 11/14/20 17:59  8/18/20 20:24


 











Last 24 Hour Vital Signs








  Date Time  Temp Pulse Resp B/P (MAP) Pulse Ox O2 Delivery O2 Flow Rate FiO2


 


8/19/20 06:47 98.1       


 


8/19/20 04:40 98.9       


 


8/19/20 04:32  101 19     35


 


8/19/20 04:00        35


 


8/19/20 04:00      Mechanical Ventilator  


 


8/19/20 04:00 99.6 113 20 144/89 (107) 100   


 


8/19/20 03:35  104      


 


8/19/20 02:35  71 27     35


 


8/19/20 00:33  122 25     35


 


8/19/20 00:00        35


 


8/19/20 00:00 98.2 112 22 131/82 (98) 99   


 


8/19/20 00:00      Mechanical Ventilator  


 


8/18/20 23:33  101      


 


8/18/20 22:51  110 22     35


 


8/18/20 21:07  96 17     35


 


8/18/20 20:00      Mechanical Ventilator  


 


8/18/20 20:00 98.6 99 18 143/78 (99) 100   


 


8/18/20 20:00        35


 


8/18/20 19:24  91      


 


8/18/20 18:30  120 19     35


 


8/18/20 17:00  118 18     35


 


8/18/20 16:00      Mechanical Ventilator  


 


8/18/20 16:00 98.0 98 19 115/61 (79) 100   


 


8/18/20 16:00        35


 


8/18/20 16:00  105      


 


8/18/20 15:16  122 21     35


 


8/18/20 13:20  73 19     35


 


8/18/20 12:00 98.0 104 19 115/70 (85) 100   


 


8/18/20 12:00      Mechanical Ventilator  


 


8/18/20 12:00  94      


 


8/18/20 12:00        35


 


8/18/20 10:30  120 19     35


 


8/18/20 09:00  94 17     35


 


8/18/20 08:00        35


 


8/18/20 08:00      Mechanical Ventilator  


 


8/18/20 08:00  94      


 


8/18/20 07:47 97.9 94 19 100/77 (85) 99   


 


8/18/20 06:35  116 20     35


 


8/18/20 05:20  94 16     35


 


8/18/20 04:00 98.9 80 19 101/63 (76) 100   


 


8/18/20 04:00  87      


 


8/18/20 04:00      Mechanical Ventilator  


 


8/18/20 04:00        35


 


8/18/20 03:30  92 16     35


 


8/18/20 01:30  108 18     35


 


8/18/20 00:00 99.3 95 19 103/57 (72) 98   


 


8/18/20 00:00      Mechanical Ventilator  


 


8/17/20 23:32  96      


 


8/17/20 23:23  94 16     35


 


8/17/20 21:00 99.9       


 


8/17/20 20:32  58 18     35


 


8/17/20 20:00      Mechanical Ventilator  


 


8/17/20 20:00 100.1 86 19 100/52 (68) 100   


 


8/17/20 20:00  73      


 


8/17/20 20:00        35


 


8/17/20 19:30  61 18     35


 


8/17/20 19:30 100.4       


 


8/17/20 17:00  116 20     35


 


8/17/20 16:02  108 21     35


 


8/17/20 16:00        35


 


8/17/20 16:00      Mechanical Ventilator  


 


8/17/20 16:00 98.0 86 19 119/62 (81) 99   


 


8/17/20 16:00  88      


 


8/17/20 13:35  120 21     35


 


8/17/20 12:00 97.9 74 18 121/57 (78) 99   


 


8/17/20 12:00        35


 


8/17/20 12:00  65      


 


8/17/20 12:00      Mechanical Ventilator  


 


8/17/20 11:04  110 20     35


 


8/17/20 08:49  84 15     35


 


8/17/20 08:00  85      


 


8/17/20 08:00        35


 


8/17/20 08:00      Mechanical Ventilator  


 


8/17/20 08:00 98.6 82 18 111/55 (73) 99   


 


8/17/20 07:17  80 13     35

















Intake and Output  


 


 8/18/20 8/19/20





 19:00 07:00


 


Intake Total 1890.0 ml 1689 ml


 


Output Total 800 ml 550 ml


 


Balance 1090.0 ml 1139 ml


 


  


 


IV Total 1690.0 ml 1689 ml


 


Other 200 ml 


 


Output Urine Total 800 ml 550 ml


 


# Bowel Movements 2 4











Labs








Test


  8/16/20


10:50 8/16/20


12:00 8/16/20


13:30 8/16/20


19:45


 


Urine Color Yellow    


 


Urine Appearance


  Slightly


cloudy 


  


  


 


 


Urine pH 5 (4.5-8.0)    


 


Urine Specific Gravity


  1.010


(1.005-1.035) 


  


  


 


 


Urine Protein


  Negative


(NEGATIVE) 


  


  


 


 


Urine Glucose (UA)


  Negative


(NEGATIVE) 


  


  


 


 


Urine Ketones


  Negative


(NEGATIVE) 


  


  


 


 


Urine Blood 3+ (NEGATIVE)    


 


Urine Nitrite


  Negative


(NEGATIVE) 


  


  


 


 


Urine Bilirubin


  Negative


(NEGATIVE) 


  


  


 


 


Urine Urobilinogen


  Normal MG/DL


(0.0-1.0) 


  


  


 


 


Urine Leukocyte Esterase 2+ (NEGATIVE)    


 


Urine RBC


  15-20 /HPF (0


- 2) 


  


  


 


 


Urine WBC


  20-30 /HPF (0


- 2) 


  


  


 


 


Urine Squamous Epithelial


Cells Moderate /LPF


(NONE/OCC) 


  


  


 


 


Urine Bacteria


  Moderate /HPF


(NONE) 


  


  


 


 


Urine Yeast


  Many /HPF


(NONE) 


  


  


 


 


White Blood Count


  


  9.5 K/UL


(4.8-10.8) 


  


 


 


Red Blood Count


  


  2.01 M/UL


(4.20-5.40) 


  


 


 


Hemoglobin


  


  6.0 G/DL


(12.0-16.0) 


  


 


 


Hematocrit


  


  18.3 %


(37.0-47.0) 


  


 


 


Mean Corpuscular Volume  91 FL (80-99)   


 


Mean Corpuscular Hemoglobin


  


  29.8 PG


(27.0-31.0) 


  


 


 


Mean Corpuscular Hemoglobin


Concent 


  32.7 G/DL


(32.0-36.0) 


  


 


 


Red Cell Distribution Width


  


  12.2 %


(11.6-14.8) 


  


 


 


Platelet Count


  


  153 K/UL


(150-450) 


  


 


 


Mean Platelet Volume


  


  9.3 FL


(6.5-10.1) 


  


 


 


Neutrophils (%) (Auto)   % (45.0-75.0)   


 


Lymphocytes (%) (Auto)   % (20.0-45.0)   


 


Monocytes (%) (Auto)   % (1.0-10.0)   


 


Eosinophils (%) (Auto)   % (0.0-3.0)   


 


Basophils (%) (Auto)   % (0.0-2.0)   


 


Differential Total Cells


Counted 


  100 


  


  


 


 


Neutrophils % (Manual)  80 % (45-75)   


 


Lymphocytes % (Manual)  11 % (20-45)   


 


Monocytes % (Manual)  6 % (1-10)   


 


Eosinophils % (Manual)  1 % (0-3)   


 


Basophils % (Manual)  1 % (0-2)   


 


Band Neutrophils  1 % (0-8)   


 


Platelet Estimate  Adequate   


 


Platelet Morphology  Normal   


 


Hypochromasia  4+   


 


Anisocytosis  1+   


 


Spherocytes  2+   


 


Prothrombin Time


  


  10.2 SEC


(9.30-11.50) 


  


 


 


Prothromb Time International


Ratio 


  0.9 (0.9-1.1) 


  


  


 


 


Activated Partial


Thromboplast Time 


  25 SEC (23-33) 


  


  


 


 


Sodium Level


  


  125 MMOL/L


(136-145) 


  


 


 


Potassium Level


  


  3.7 MMOL/L


(3.5-5.1) 


  


 


 


Chloride Level


  


  85 MMOL/L


() 


  


 


 


Carbon Dioxide Level


  


  34 MMOL/L


(21-32) 


  


 


 


Anion Gap


  


  6 mmol/L


(5-15) 


  


 


 


Blood Urea Nitrogen


  


  112 mg/dL


(7-18) 


  


 


 


Creatinine


  


  1.8 MG/DL


(0.55-1.30) 


  


 


 


Estimat Glomerular Filtration


Rate 


  26.6 mL/min


(>60) 


  


 


 


Glucose Level


  


  127 MG/DL


() 


  


 


 


Lactic Acid Level


  


  3.20 mmol/L


(0.4-2.0) 2.60 mmol/L


(0.66-2.22) 


 


 


Calcium Level


  


  7.3 MG/DL


(8.5-10.1) 


  


 


 


Total Bilirubin


  


  0.5 MG/DL


(0.2-1.0) 


  


 


 


Aspartate Amino Transf


(AST/SGOT) 


  62 U/L (15-37) 


  


  


 


 


Alanine Aminotransferase


(ALT/SGPT) 


  46 U/L (12-78) 


  


  


 


 


Alkaline Phosphatase


  


  87 U/L


() 


  


 


 


Troponin I


  


  3.205 ng/mL


(0.000-0.056) 


  2.111 ng/mL


(0.000-0.056)


 


Pro-B-Type Natriuretic Peptide


  


  43904 pg/mL


(0-125) 


  


 


 


Total Protein


  


  4.8 G/DL


(6.4-8.2) 


  


 


 


Albumin


  


  1.8 G/DL


(3.4-5.0) 


  


 


 


Globulin  3.0 g/dL   


 


Albumin/Globulin Ratio  0.6 (1.0-2.7)   


 


Test


  8/17/20


03:23 8/17/20


15:00 8/18/20


02:50 8/19/20


02:50


 


White Blood Count


  8.0 K/UL


(4.8-10.8) 


  6.8 K/UL


(4.8-10.8) 7.2 K/UL


(4.8-10.8)


 


Red Blood Count


  2.92 M/UL


(4.20-5.40) 


  2.61 M/UL


(4.20-5.40) 2.74 M/UL


(4.20-5.40)


 


Hemoglobin


  8.6 G/DL


(12.0-16.0) 


  7.7 G/DL


(12.0-16.0) 8.1 G/DL


(12.0-16.0)


 


Hematocrit


  25.5 %


(37.0-47.0) 


  23.1 %


(37.0-47.0) 24.6 %


(37.0-47.0)


 


Mean Corpuscular Volume 87 FL (80-99)   89 FL (80-99)  90 FL (80-99) 


 


Mean Corpuscular Hemoglobin


  29.4 PG


(27.0-31.0) 


  29.6 PG


(27.0-31.0) 29.7 PG


(27.0-31.0)


 


Mean Corpuscular Hemoglobin


Concent 33.6 G/DL


(32.0-36.0) 


  33.4 G/DL


(32.0-36.0) 33.0 G/DL


(32.0-36.0)


 


Red Cell Distribution Width


  12.5 %


(11.6-14.8) 


  12.8 %


(11.6-14.8) 12.9 %


(11.6-14.8)


 


Platelet Count


  141 K/UL


(150-450) 


  155 K/UL


(150-450) 167 K/UL


(150-450)


 


Mean Platelet Volume


  8.0 FL


(6.5-10.1) 


  8.1 FL


(6.5-10.1) 6.9 FL


(6.5-10.1)


 


Neutrophils (%) (Auto)  % (45.0-75.0)    % (45.0-75.0)   % (45.0-75.0) 


 


Lymphocytes (%) (Auto)  % (20.0-45.0)    % (20.0-45.0)   % (20.0-45.0) 


 


Monocytes (%) (Auto)  % (1.0-10.0)    % (1.0-10.0)   % (1.0-10.0) 


 


Eosinophils (%) (Auto)  % (0.0-3.0)    % (0.0-3.0)   % (0.0-3.0) 


 


Basophils (%) (Auto)  % (0.0-2.0)    % (0.0-2.0)   % (0.0-2.0) 


 


Reticulocyte Count


  1.8 %


(0.5-2.0) 


  


  


 


 


Sodium Level


  130 MMOL/L


(136-145) 


  132 MMOL/L


(136-145) 137 MMOL/L


(136-145)


 


Potassium Level


  3.1 MMOL/L


(3.5-5.1) 


  3.2 MMOL/L


(3.5-5.1) 3.4 MMOL/L


(3.5-5.1)


 


Chloride Level


  92 MMOL/L


() 


  98 MMOL/L


() 102 MMOL/L


()


 


Carbon Dioxide Level


  31 MMOL/L


(21-32) 


  28 MMOL/L


(21-32) 26 MMOL/L


(21-32)


 


Anion Gap


  7 mmol/L


(5-15) 


  6 mmol/L


(5-15) 9 mmol/L


(5-15)


 


Blood Urea Nitrogen


  103 mg/dL


(7-18) 


  83 mg/dL


(7-18) 62 mg/dL


(7-18)


 


Creatinine


  1.5 MG/DL


(0.55-1.30) 


  1.4 MG/DL


(0.55-1.30) 1.2 MG/DL


(0.55-1.30)


 


Estimat Glomerular Filtration


Rate 32.9 mL/min


(>60) 


  35.5 mL/min


(>60) 42.5 mL/min


(>60)


 


Glucose Level


  128 MG/DL


() 


  118 MG/DL


() 117 MG/DL


()


 


Uric Acid


  9.8 MG/DL


(2.6-7.2) 


  


  


 


 


Calcium Level


  6.8 MG/DL


(8.5-10.1) 


  7.1 MG/DL


(8.5-10.1) 7.4 MG/DL


(8.5-10.1)


 


Phosphorus Level


  4.5 MG/DL


(2.5-4.9) 


  3.5 MG/DL


(2.5-4.9) 2.7 MG/DL


(2.5-4.9)


 


Magnesium Level


  2.2 MG/DL


(1.8-2.4) 


  2.2 MG/DL


(1.8-2.4) 2.1 MG/DL


(1.8-2.4)


 


Iron Level


  23 ug/dL


() 


  


  


 


 


Total Iron Binding Capacity


  115 ug/dL


(250-450) 


  


  


 


 


Percent Iron Saturation 20 % (15-50)    


 


Unsaturated Iron Binding


  92 ug/dL


(112-346) 


  


  


 


 


Ferritin


  1338 NG/ML


(8-388) 


  


  


 


 


Total Bilirubin


  0.6 MG/DL


(0.2-1.0) 


  0.8 MG/DL


(0.2-1.0) 1.0 MG/DL


(0.2-1.0)


 


Aspartate Amino Transf


(AST/SGOT) 57 U/L (15-37) 


  


  50 U/L (15-37) 


  46 U/L (15-37) 


 


 


Alanine Aminotransferase


(ALT/SGPT) 45 U/L (12-78) 


  


  45 U/L (12-78) 


  47 U/L (12-78) 


 


 


Alkaline Phosphatase


  92 U/L


() 


  123 U/L


() 164 U/L


()


 


Lactate Dehydrogenase


  462 U/L


() 


  


  


 


 


Troponin I


  1.928 ng/mL


(0.000-0.056) 


  


  


 


 


C-Reactive Protein,


Quantitative 9.9 mg/dL


(0.00-0.90) 


  6.7 mg/dL


(0.00-0.90) 


 


 


Pro-B-Type Natriuretic Peptide


  15160 pg/mL


(0-125) 


  96107 pg/mL


(0-125) 


 


 


Total Protein


  4.7 G/DL


(6.4-8.2) 


  4.7 G/DL


(6.4-8.2) 5.2 G/DL


(6.4-8.2)


 


Albumin


  1.7 G/DL


(3.4-5.0) 


  1.9 G/DL


(3.4-5.0) 2.3 G/DL


(3.4-5.0)


 


Globulin 3.0 g/dL   2.8 g/dL  2.9 g/dL 


 


Albumin/Globulin Ratio 0.6 (1.0-2.7)   0.7 (1.0-2.7)  0.8 (1.0-2.7) 


 


Folate


  15.5 NG/ML


(8.6-58.9) 


  


  


 


 


Thyroid Stimulating Hormone


(TSH) 1.954 uiU/mL


(0.358-3.740) 


  


  


 


 


Cortisol AM Sample 16.5 UG/DL    


 


Stool Occult Blood


  


  Positive


(NEGATIVE) 


  


 


 


Vitamin B12 Level


  


  


  1289 PG/ML


(193-986) 


 








Height (Feet):  5


Height (Inches):  2.00


Weight (Pounds):  155


Objective


PE


General: Obtunded, no spontaneous movement.  No obvious distress


HEENT: NC/AT. 


Neck: Tracheostomy appears appropriately placed


Cardiovascular: RRR.  S1 and S2 normal.  No murmur appreciated


Resp: Normal work of breathing. No cough


Abdomen: Abdomen is soft, nondistended.  Gastrostomy tube appears in 

appropriate position without surrounding signs of infection or trauma.


Skin: Intact.  No abrasions, laceration or rash over the exposed skin


MSK: Normal tone and bulk.


Neuro: Obtunded











Felix Barriga MD Aug 19, 2020 07:10

## 2020-08-19 NOTE — NUR
NURSE NOTES:

assessed pt. to right femoral area where femoral line was removed prior to shift- no 
bleeding noted- gauze appears to be dry- will continue to monitor pt. and with plan of care. 
pt. remains stable.

## 2020-08-19 NOTE — NUR
NURSE NOTES:

per Myles at lab there is no order for catheter tip- put order for catheter tip in again - 
order was cancelled again- called lab spoke with Janeth - notified her catheter tip was 
cancelled- per Janeth she will look in to this and f/u again with me.

## 2020-08-19 NOTE — SURGERY PROGRESS NOTE
Surgery Progress Note


Subjective


Additional Comments


scope today


gastritis


confused


comfortable appearing


no n/v/f





Objective





Last 24 Hour Vital Signs








  Date Time  Temp Pulse Resp B/P (MAP) Pulse Ox O2 Delivery O2 Flow Rate FiO2


 


8/19/20 12:00        35


 


8/19/20 12:00  79      


 


8/19/20 12:00 97.5 91 17 134/83 (100) 100   


 


8/19/20 10:44  84 16     35


 


8/19/20 10:11  98 14  98   


 


8/19/20 08:54  86 21     35


 


8/19/20 08:49  100 10  99   


 


8/19/20 08:00        35


 


8/19/20 08:00 98.0 98 18 111/74 (86) 100   


 


8/19/20 07:56  90      


 


8/19/20 07:08  77 16     35


 


8/19/20 06:47 98.1       


 


8/19/20 04:40 98.9       


 


8/19/20 04:32  101 19     35


 


8/19/20 04:00        35


 


8/19/20 04:00      Mechanical Ventilator  


 


8/19/20 04:00 99.6 113 20 144/89 (107) 100   


 


8/19/20 03:35  104      


 


8/19/20 02:35  71 27     35


 


8/19/20 00:33  122 25     35


 


8/19/20 00:00        35


 


8/19/20 00:00 98.2 112 22 131/82 (98) 99   


 


8/19/20 00:00      Mechanical Ventilator  


 


8/18/20 23:33  101      


 


8/18/20 22:51  110 22     35


 


8/18/20 21:07  96 17     35


 


8/18/20 20:00      Mechanical Ventilator  


 


8/18/20 20:00 98.6 99 18 143/78 (99) 100   


 


8/18/20 20:00        35


 


8/18/20 19:24  91      


 


8/18/20 18:30  120 19     35


 


8/18/20 17:00  118 18     35


 


8/18/20 16:00      Mechanical Ventilator  


 


8/18/20 16:00 98.0 98 19 115/61 (79) 100   


 


8/18/20 16:00        35


 


8/18/20 16:00  105      


 


8/18/20 15:16  122 21     35


 


8/18/20 13:20  73 19     35








I&O











Intake and Output  


 


 8/18/20 8/19/20





 19:00 07:00


 


Intake Total 1890.0 ml 1789 ml


 


Output Total 800 ml 550 ml


 


Balance 1090.0 ml 1239 ml


 


  


 


IV Total 1690.0 ml 1789 ml


 


Other 200 ml 


 


Output Urine Total 800 ml 550 ml


 


# Bowel Movements 2 4








Dressing:  other


Wound:  other


Cardiovascular:  RSR


Respiratory:  decreased breath sounds


Abdomen:  soft, non-tender, present bowel sounds


Extremities:  no tenderness, no cyanosis





Laboratory Tests








Test


  8/19/20


02:50 8/19/20


09:30


 


White Blood Count


  7.2 K/UL


(4.8-10.8) 


 


 


Red Blood Count


  2.74 M/UL


(4.20-5.40)  L 


 


 


Hemoglobin


  8.1 G/DL


(12.0-16.0)  L 


 


 


Hematocrit


  24.6 %


(37.0-47.0)  L 


 


 


Mean Corpuscular Volume 90 FL (80-99)   


 


Mean Corpuscular Hemoglobin


  29.7 PG


(27.0-31.0) 


 


 


Mean Corpuscular Hemoglobin


Concent 33.0 G/DL


(32.0-36.0) 


 


 


Red Cell Distribution Width


  12.9 %


(11.6-14.8) 


 


 


Platelet Count


  167 K/UL


(150-450) 


 


 


Mean Platelet Volume


  6.9 FL


(6.5-10.1) 


 


 


Neutrophils (%) (Auto)


  % (45.0-75.0)


  


 


 


Lymphocytes (%) (Auto)


  % (20.0-45.0)


  


 


 


Monocytes (%) (Auto)  % (1.0-10.0)   


 


Eosinophils (%) (Auto)  % (0.0-3.0)   


 


Basophils (%) (Auto)  % (0.0-2.0)   


 


Differential Total Cells


Counted 100  


  


 


 


Neutrophils % (Manual) 87 % (45-75)  H 


 


Lymphocytes % (Manual) 4 % (20-45)  L 


 


Monocytes % (Manual) 7 % (1-10)   


 


Eosinophils % (Manual) 1 % (0-3)   


 


Basophils % (Manual) 1 % (0-2)   


 


Band Neutrophils 0 % (0-8)   


 


Platelet Estimate Adequate   


 


Platelet Morphology Normal   


 


Hypochromasia 1+   


 


Sodium Level


  137 MMOL/L


(136-145) 


 


 


Potassium Level


  3.4 MMOL/L


(3.5-5.1)  L 


 


 


Chloride Level


  102 MMOL/L


() 


 


 


Carbon Dioxide Level


  26 MMOL/L


(21-32) 


 


 


Anion Gap


  9 mmol/L


(5-15) 


 


 


Blood Urea Nitrogen


  62 mg/dL


(7-18)  H 


 


 


Creatinine


  1.2 MG/DL


(0.55-1.30) 


 


 


Estimat Glomerular Filtration


Rate 42.5 mL/min


(>60) 


 


 


Glucose Level


  117 MG/DL


()  H 


 


 


Calcium Level


  7.4 MG/DL


(8.5-10.1)  L 


 


 


Phosphorus Level


  2.7 MG/DL


(2.5-4.9) 


 


 


Magnesium Level


  2.1 MG/DL


(1.8-2.4) 


 


 


Total Bilirubin


  1.0 MG/DL


(0.2-1.0) 


 


 


Aspartate Amino Transf


(AST/SGOT) 46 U/L (15-37)


H 


 


 


Alanine Aminotransferase


(ALT/SGPT) 47 U/L (12-78)


  


 


 


Alkaline Phosphatase


  164 U/L


()  H 


 


 


Total Protein


  5.2 G/DL


(6.4-8.2)  L 


 


 


Albumin


  2.3 G/DL


(3.4-5.0)  L 


 


 


Globulin 2.9 g/dL   


 


Albumin/Globulin Ratio


  0.8 (1.0-2.7)


L 


 


 


Cortisol AM Sample Pending   


 


Stool Occult Blood  Pending  











Plan


Problems:  


(1) Sepsis


(2) Nosocomial pneumonia


(3) Hyponatremia


(4) UTI (urinary tract infection)


(5) Hypotension


(6) Chronic respiratory failure


Assessment & Plan:  87F ill appearing trach on vent


trach evaluated and 8f xlt noted


sutures in place causing tension and breakdown


sutures removed at bedside


trach stoma evaluated and with breakdown.  dressings applied


trach in place and functional


cont vent support


will monitor and follow with recs


thank you 





Pt presented on admission with Tracheostomy ,Generalized edema, Multiple 

Pressure injuries.


DTPI that is de-capped L Scapula(L)3.4cmx (W)7.3cm. Base of wound is 90% slough 

10% Loose purpuric base and edges. Surrounding non-blanching erythema.


Inferior L scapula ,but in close proximity is DTPI (L)1.9cm x (W)4.4cm. Base of 

wound is purpuric with Maroon borders. 


Within close proximity to both wounds #3 linear purpuric areas that fans from 

scapula area outward towards thoracic spine.


DTPI Sacrococcygeal to L Buttocks (L)7.4cm x (W)4.3cm.Base of wound is purpuric 

and indurated with surrounding non-blanching erythema.


Two Partial thickness pressure injuries noted to L buttocks(Proximal)2.5cm x (W)

4.6cm. Base of wound is moist and viable.Surrounding Non-Blanching erythema.(

Distal)1cm x (W)1cm. Base of wound is moist and viable.Surrounding Non-

Blanching erythema.


Perineal area is erythematous and moist . A purpuric area that is fluctuant 

noted at Perineum.


Bilat lower ext are edematous. Pt noted to have a large serous filled Bulla R 

Heel(L)11.5cm x (W)11.5cm.Surrounding area of heel is firm and pink. A second 

smaller serous Bulla noted to dorso/lateral R foot (L)2cm x(W)2.2cm.Scattered 

small purpuric areas noted to heads of 2nd ,3rd,4th metatarsals.


Multiple purpuric areas noted to distal/lateral L tibia and L foot.


Elongated Purpuric Area with Marginal erythema along edges noted to distal/

Lateral L tibia(L)7cm x (W)1.8cm.


L Heel /L Foot is an irregular shaped Purpuric area with marginal erythema 

without fluctuance/induration (L)3cm x (W)9.5cm. 


L Lateral Malleolus DTPI(L)1.5cm x (W)1.9cm.. Base of injury is fluctuant, 

purpuric with Maroon borders.


DTPI distal/lateral L foot (L)1.5cm x (W)1.5cm. Base of injury is maroon with 

fluctuance.


L Heel is boggy with non-blanchable erythema.





Tx.Plan: Cleanse wound L Scapula with Saline. Apply Therahoney. Apply Cavilon 

Skin Barrier Periwound. Cover with Optifoam 


            Drsg every 3 days and prn.


            Apply Cavilon Skin Barrier to Purpuric areas L Scapula. Cover with 

Optifoam drsg every 3 days and prn.


            Apply Moisture Barrier Paste to Sacrum and L Buttocks. Cover each 

site with Optifoam drsgs. Change every 3 days


            and PRN.


            Apply Cavilon Skin Barrier Spray to Blisters R foot. Cover with ABD 

Pads and wrap with Kerlix every 3 days and prn.


            Apply Cavilon Barrier Spray To Purpuric areas Distal L tibia and L 

foot. Cover with ABD Pads and wrap with Kerlix 


            every 3 days and prn.


            Reposition at least every 2hors or as tolerated .


            Off-load heels with Pillow.


            Place Pillow between knees.


            APM/HEATHER Mattress.








(7) Anemia


(8) NSTEMI (non-ST elevated myocardial infarction)


(9) Feeding by G-tube


Assessment & Plan:  DAILY ESTIMATED NEEDS:


Needs based on wound, critical care 47.5kg abw 


25-30  kcals/kg 


7197-4381  total kcals


1.25-2  g protein/kg


59-95  g total protein 


25-30ml/kcal   mL/kg


8254-0308  total fluid mLs





NUTRITION DIAGNOSIS:


Swallowing difficulty r/t respiratory status as evidenced by pt is trach


and peg dep.





CURRENT TF: NPO for EGD  





ENTERAL NUTRITION RECOMMENDATIONS:


Osmolite 1.2 goal of 45ml/hr x24 hrs + Prosource 1 pack daily    to provide 

1080ml, 1296 kcal, 60g + 11g pro, 886ml free H2O  





- As medically able, start Osmolite low fiber TF @low rate 25ml/hr for 6 hrs.


- Advance as tolerated 10ml/hr q4-6 hrs to goal


- Flush per MD/ HOB over 30 degrees


- Add Prosource 1 pack daily to better meet est pro needs.








ADDITIONAL RECOMMENDATIONS:


1) F/up w/ WC eval, add KEVIN BID w/ TF order 


2) Per SNF : 4'8" (56 inches) and 142 lbs/64.55kg 


3) Monitor renal function, lytes; need for renal TF formula  


4) Monitor BG and need for carb control formula 


   -> rec accuchecks + niss  





(10) XUAN (acute kidney injury)


(11) Chronic vegetative state


(12) ICH (intracerebral hemorrhage)











Reymundo Driscoll Aug 19, 2020 13:01

## 2020-08-19 NOTE — ENDOSCOPY PROCEDURE NOTE
Endoscopy Procedure Note


General


Indication for Procedure:  gib


Procedures Performed:  EGD


Operative Findings/Diagnosis:  gastritis


Specimen:  yes


Pt Tolerated Procedure Well:  Yes


Estimated Blood Loss:  none





Anesthesia


Anesthesiologist:  nicki


Anesthesia:  MAC





Inserted Devices


Implant(s) used?:  No





GI Core Measures


50 yrs or older w/o bx or poly:  Not Applicable


10yrs. F/U recommended:  Not Applicable











George Mir MD Aug 19, 2020 08:26

## 2020-08-19 NOTE — DIAGNOSTIC IMAGING REPORT
Indication: Shortness of breath. Bilateral lower extremity edema

 

Technique: Grayscale and duplex images of the  bilateral lower extremity veins

 

Comparison: None

 

Findings: Bilaterally,   grayscale and duplex images demonstrate no evidence of

intraluminal thrombus. Normal phasic Doppler waveforms, demonstrating normal

augmentation response and no evidence of valvular insufficiency. Greater saphenous

vein(s) and tibial veins are patent. Normal compressibility. 

 

Impression: Negative for evidence of lower extremity deep venous thrombosis 

bilaterally

## 2020-08-19 NOTE — NEPHROLOGY PROGRESS NOTE
Assessment/Plan


Problem List:  


(1) XUAN (acute kidney injury)


(2) NSTEMI (non-ST elevated myocardial infarction)


(3) Anemia


(4) Chronic respiratory failure


(5) Hypotension


(6) Hyponatremia


(7) ICH (intracerebral hemorrhage)


(8) Sepsis


Assessment





Acute renal failure, mainly prerenal picture


Non-STEMI: Troponin 2.1


Chronic ventilatory dependent respiratory failure, history of intracranial bleed


Septic shock with lactic acidosis, possible UTI, possible pneumonia


Severe hyponatremia


Severe anemia, history of GI bleed


Hypertension


GERD


Plan


August 19: Labs reviewed.  Serum creatinine normalized.  Patient edematous.  

Will decrease IV fluid.  Will give Lasix IV 20 mg daily.  Potassium supplement.

  Continue to monitor renal parameters.  Discussed with ROSALBA Castanon.





Hydrate


Hold BP medications, blood pressure is low


IV Protonix, Carafate


Antibiotics per ID


Monitor renal parameters and electrolytes


Potassium supplement as needed


Transfusion as needed


Per consultants





Subjective


ROS Limited/Unobtainable:  Yes





Objective


Objective





Last 24 Hour Vital Signs








  Date Time  Temp Pulse Resp B/P (MAP) Pulse Ox O2 Delivery O2 Flow Rate FiO2


 


8/19/20 13:05  122 26     35


 


8/19/20 12:00        35


 


8/19/20 12:00  79      


 


8/19/20 12:00 97.5 91 17 134/83 (100) 100   


 


8/19/20 10:44  84 16     35


 


8/19/20 10:11  98 14  98   


 


8/19/20 08:54  86 21     35


 


8/19/20 08:49  100 10  99   


 


8/19/20 08:00        35


 


8/19/20 08:00 98.0 98 18 111/74 (86) 100   


 


8/19/20 07:56  90      


 


8/19/20 07:08  77 16     35


 


8/19/20 06:47 98.1       


 


8/19/20 04:40 98.9       


 


8/19/20 04:32  101 19     35


 


8/19/20 04:00        35


 


8/19/20 04:00      Mechanical Ventilator  


 


8/19/20 04:00 99.6 113 20 144/89 (107) 100   


 


8/19/20 03:35  104      


 


8/19/20 02:35  71 27     35


 


8/19/20 00:33  122 25     35


 


8/19/20 00:00        35


 


8/19/20 00:00 98.2 112 22 131/82 (98) 99   


 


8/19/20 00:00      Mechanical Ventilator  


 


8/18/20 23:33  101      


 


8/18/20 22:51  110 22     35


 


8/18/20 21:07  96 17     35


 


8/18/20 20:00      Mechanical Ventilator  


 


8/18/20 20:00 98.6 99 18 143/78 (99) 100   


 


8/18/20 20:00        35


 


8/18/20 19:24  91      


 


8/18/20 18:30  120 19     35


 


8/18/20 17:00  118 18     35


 


8/18/20 16:00      Mechanical Ventilator  


 


8/18/20 16:00 98.0 98 19 115/61 (79) 100   


 


8/18/20 16:00        35


 


8/18/20 16:00  105      


 


8/18/20 15:16  122 21     35

















Intake and Output  


 


 8/18/20 8/19/20





 19:00 07:00


 


Intake Total 1890.0 ml 1789 ml


 


Output Total 800 ml 550 ml


 


Balance 1090.0 ml 1239 ml


 


  


 


IV Total 1690.0 ml 1789 ml


 


Other 200 ml 


 


Output Urine Total 800 ml 550 ml


 


# Bowel Movements 2 4








Laboratory Tests


8/19/20 02:50: 


White Blood Count 7.2, Red Blood Count 2.74L, Hemoglobin 8.1L, Hematocrit 24.6L

, Mean Corpuscular Volume 90, Mean Corpuscular Hemoglobin 29.7, Mean 

Corpuscular Hemoglobin Concent 33.0, Red Cell Distribution Width 12.9, Platelet 

Count 167, Mean Platelet Volume 6.9, Neutrophils (%) (Auto) , Lymphocytes (%) (

Auto) , Monocytes (%) (Auto) , Eosinophils (%) (Auto) , Basophils (%) (Auto) , 

Differential Total Cells Counted 100, Neutrophils % (Manual) 87H, Lymphocytes % 

(Manual) 4L, Monocytes % (Manual) 7, Eosinophils % (Manual) 1, Basophils % (

Manual) 1, Band Neutrophils 0, Platelet Estimate Adequate, Platelet Morphology 

Normal, Hypochromasia 1+, Sodium Level 137, Potassium Level 3.4L, Chloride 

Level 102, Carbon Dioxide Level 26, Anion Gap 9, Blood Urea Nitrogen 62H, 

Creatinine 1.2, Estimat Glomerular Filtration Rate 42.5, Glucose Level 117H, 

Calcium Level 7.4L, Phosphorus Level 2.7, Magnesium Level 2.1, Total Bilirubin 

1.0, Aspartate Amino Transf (AST/SGOT) 46H, Alanine Aminotransferase (ALT/SGPT) 

47, Alkaline Phosphatase 164H, Total Protein 5.2L, Albumin 2.3L, Globulin 2.9, 

Albumin/Globulin Ratio 0.8L, Cortisol AM Sample [Pending]


8/19/20 09:30: Stool Occult Blood [Pending]


Height (Feet):  5


Height (Inches):  2.00


Weight (Pounds):  155


General Appearance:  no apparent distress, lethargic


EENT:  other - Trach to vent


Cardiovascular:  tachycardia


Respiratory/Chest:  decreased breath sounds


Abdomen:  distended











Mando Garcia MD Aug 19, 2020 14:12

## 2020-08-19 NOTE — NUR
NURSE HAND-OFF REPORT: 



Important Events on Shift:none

Patient Status: stable

Diet: NPO



Pending Orders: 

Pending Results/Labs:

Pending MD notification:



Latest Vital Signs: Temperature 98.1 , Pulse 77 , B/P 144 /89 , Respiratory Rate 16 , O2 SAT 
100 , Mechanical Ventilator, O2 Flow Rate .  

Vital Sign Comment: 



EKG Rhythm: Atrial Fibrillation

Rhythm change?: N 

MD Notified?: N -

MD Response: 



Latest Wilkinson Fall Score: 70  

Fall Risk: High Risk 

Safety Measures: Call light Within Reach, Bed Alarm Zone 1, Side Rails Side Rails x3, Bed 
position Low and Locked.

Fall Precautions: 

Yellow Socks

Yellow Gown

Patient Fall Education



Report given to Al Rn- aware to f/u on any abnormal am labs and EGD scheduled today.

## 2020-08-19 NOTE — IMMEDIATE POST-OP EVALUATION
Immediate Post-Op Evalulation


Immediate Post-Op Evalulation


Procedure:  EGD


Date of Evaluation:  Aug 19, 2020


Time of Evaluation:  08:30


IV Fluids:  100


Blood Pressure Systolic:  125


Blood Pressure Diastolic:  70


Pulse Rate:  100


Respiratory Rate:  10


O2 Sat by Pulse Oximetry:  99


Nausea:  No


Vomiting:  No


Patient Status:  reacts, patent, ventilated


Hydration Status:  adequate


Drug:  none











Renée Bills CRNA Aug 19, 2020 08:49

## 2020-08-20 VITALS — SYSTOLIC BLOOD PRESSURE: 149 MMHG | DIASTOLIC BLOOD PRESSURE: 102 MMHG

## 2020-08-20 VITALS — DIASTOLIC BLOOD PRESSURE: 95 MMHG | SYSTOLIC BLOOD PRESSURE: 157 MMHG

## 2020-08-20 VITALS — SYSTOLIC BLOOD PRESSURE: 141 MMHG | DIASTOLIC BLOOD PRESSURE: 81 MMHG

## 2020-08-20 VITALS — SYSTOLIC BLOOD PRESSURE: 139 MMHG | DIASTOLIC BLOOD PRESSURE: 94 MMHG

## 2020-08-20 VITALS — SYSTOLIC BLOOD PRESSURE: 116 MMHG | DIASTOLIC BLOOD PRESSURE: 82 MMHG

## 2020-08-20 VITALS — DIASTOLIC BLOOD PRESSURE: 104 MMHG | SYSTOLIC BLOOD PRESSURE: 133 MMHG

## 2020-08-20 VITALS — SYSTOLIC BLOOD PRESSURE: 150 MMHG | DIASTOLIC BLOOD PRESSURE: 98 MMHG

## 2020-08-20 LAB
ADD MANUAL DIFF: NO
ANION GAP SERPL CALC-SCNC: 6 MMOL/L (ref 5–15)
BASOPHILS NFR BLD AUTO: 0.8 % (ref 0–2)
BUN SERPL-MCNC: 48 MG/DL (ref 7–18)
CALCIUM SERPL-MCNC: 7.6 MG/DL (ref 8.5–10.1)
CHLORIDE SERPL-SCNC: 106 MMOL/L (ref 98–107)
CO2 SERPL-SCNC: 25 MMOL/L (ref 21–32)
CREAT SERPL-MCNC: 1 MG/DL (ref 0.55–1.3)
EOSINOPHIL NFR BLD AUTO: 2.6 % (ref 0–3)
ERYTHROCYTE [DISTWIDTH] IN BLOOD BY AUTOMATED COUNT: 13.2 % (ref 11.6–14.8)
HCT VFR BLD CALC: 24.5 % (ref 37–47)
HGB BLD-MCNC: 8 G/DL (ref 12–16)
LYMPHOCYTES NFR BLD AUTO: 10 % (ref 20–45)
MCV RBC AUTO: 90 FL (ref 80–99)
MONOCYTES NFR BLD AUTO: 4.5 % (ref 1–10)
NEUTROPHILS NFR BLD AUTO: 82.1 % (ref 45–75)
PLATELET # BLD: 172 K/UL (ref 150–450)
POTASSIUM SERPL-SCNC: 3.5 MMOL/L (ref 3.5–5.1)
RBC # BLD AUTO: 2.72 M/UL (ref 4.2–5.4)
SODIUM SERPL-SCNC: 137 MMOL/L (ref 136–145)
WBC # BLD AUTO: 5.7 K/UL (ref 4.8–10.8)

## 2020-08-20 RX ADMIN — SUCRALFATE SCH GM: 1 TABLET ORAL at 13:03

## 2020-08-20 RX ADMIN — PANTOPRAZOLE SODIUM SCH MG: 40 INJECTION, POWDER, FOR SOLUTION INTRAVENOUS at 21:26

## 2020-08-20 RX ADMIN — MEROPENEM SCH MLS/HR: 500 INJECTION INTRAVENOUS at 10:03

## 2020-08-20 RX ADMIN — HUMAN INSULIN SCH MLS/HR: 100 INJECTION, SOLUTION SUBCUTANEOUS at 02:55

## 2020-08-20 RX ADMIN — SUCRALFATE SCH GM: 1 TABLET ORAL at 21:26

## 2020-08-20 RX ADMIN — PANTOPRAZOLE SODIUM SCH MG: 40 INJECTION, POWDER, FOR SOLUTION INTRAVENOUS at 09:34

## 2020-08-20 RX ADMIN — SUCRALFATE SCH GM: 1 TABLET ORAL at 09:00

## 2020-08-20 RX ADMIN — AMPICILLIN SODIUM SCH MLS/HR: 2 INJECTION, POWDER, FOR SOLUTION INTRAVENOUS at 10:03

## 2020-08-20 RX ADMIN — ACETAMINOPHEN PRN MG: 160 SOLUTION ORAL at 21:27

## 2020-08-20 RX ADMIN — DOCUSATE SODIUM SCH MG: 50 LIQUID ORAL at 18:37

## 2020-08-20 RX ADMIN — SUCRALFATE SCH GM: 1 TABLET ORAL at 18:36

## 2020-08-20 RX ADMIN — DEXTROSE MONOHYDRATE SCH MLS/HR: 50 INJECTION, SOLUTION INTRAVENOUS at 21:28

## 2020-08-20 RX ADMIN — AMPICILLIN SODIUM SCH MLS/HR: 2 INJECTION, POWDER, FOR SOLUTION INTRAVENOUS at 18:36

## 2020-08-20 RX ADMIN — AMPICILLIN SODIUM SCH MLS/HR: 2 INJECTION, POWDER, FOR SOLUTION INTRAVENOUS at 02:54

## 2020-08-20 RX ADMIN — DOCUSATE SODIUM SCH MG: 50 LIQUID ORAL at 09:33

## 2020-08-20 RX ADMIN — MEROPENEM SCH MLS/HR: 500 INJECTION INTRAVENOUS at 21:28

## 2020-08-20 NOTE — NUR
NURSE NOTES:

Received report from ROSALBA Vincent with update.

Pt obtunded. unable to make needs known. contracted and passive to care.

157/96 | 141 bpm | 100% O2 sat | 98.9 axil | 20 RR

No respiratory distress noted on vent: T:V S8 | A/C 10 | Vt 400 | FiO2 35% and P5

Pt g-tube intact with 20 cc residual. Observed running NPO for the past 4 days. 

Edema noted. Alterations in skin noted.

Aware of plans to rescheduled colonoscopy tomorrow. 

Will contact Charla in regards to clarifying to hold 1 PRBC.

Bed placed in lowest and locked position. Bed alarm on and will continue to monitor.

## 2020-08-20 NOTE — NUR
NURSE HAND-OFF REPORT: 



Important Events on Shift:Elevated BP, 1 Unit blood ordered

Patient Status: Full code stable

Diet: NPO



Pending Orders: 1u PRBC

Pending Results/Labs:N/A

Pending MD notification:1u PRBC with elevated BP



Latest Vital Signs: Temperature 98.2 , Pulse 120 , B/P 133 /104 , Respiratory Rate 20 , O2 
 , Mechanical Ventilator, O2 Flow Rate .  

Vital Sign Comment: Elevated BP reported to Dr Redding. Waiting for call back. 



EKG Rhythm: Atrial Fibrillation

Rhythm change?: N 

MD Notified?: N -

MD Response: 



Latest Wilkinson Fall Score: 70  

Fall Risk: High Risk 

Safety Measures: Call light Within Reach, Bed Alarm Zone 1, Side Rails Side Rails x3, Bed 
position Low and Locked.

Fall Precautions: 

Yellow Socks

Yellow Gown

Patient Fall Education



Report given to ROSALBA Wolfe.

## 2020-08-20 NOTE — NUR
RD ASSESSMENT & RECOMMENDATIONS

SEE CARE ACTIVITY FOR COMPLETE ASSESSMENT



DAILY ESTIMATED NEEDS:

Needs based on wound, critical care 47.5kg abw 

25-30  kcals/kg 

1683-5752  total kcals

1.25-2  g protein/kg

59-95  g total protein 

25-30ml/kcal   mL/kg

8556-8097  total fluid mLs



NUTRITION DIAGNOSIS:

Swallowing difficulty r/t respiratory status as evidenced by pt is trach

and peg dep.



CURRENT TF--->>> NPO for EGD, now for colonoscopy  



ENTERAL NUTRITION RECOMMENDATIONS:

Osmolite 1.2 goal of 45ml/hr x24 hrs + Prosource 1 pack daily    

to provide 1080ml, 1296 kcal, 60g + 11g pro, 886ml free H2O  



- As medically able, start Osmolite low fiber TF @low rate 25ml/hr for 6 hrs.

- Advance as tolerated 10ml/hr q4-6 hrs to goal

- Flush per MD/ HOB over 30 degrees

- Add Prosource 1 pack daily to better meet est pro needs.



ADDITIONAL RECOMMENDATIONS:

1) Wound care: add KEVIN BID w/ TF order + Vit C 250mg qdaily  

2) Per SNF : 4'8" (56 inches) and 142 lbs/64.55kg 

3) Monitor renal function, lytes; need for renal TF formula  

4) Monitor BG and need for carb control formula 

   -> rec accuchecks + niss

## 2020-08-20 NOTE — NUR
NURSE NOTES:

Made aware of plan for abx CT, per Dr. Feldman, if colonoscopy is normal. 

Dr. Barriga at nurses station; Clarified orders to transfuse 1 pRBC if Hgb <8.5. 

patient prepped for GI colonoscopy. x3 yellow-green liquid exuded after colon cleanse. 

Endorsed plan to day nurse.

## 2020-08-20 NOTE — PULMONOLGY CRITICAL CARE NOTE
Critical Care - Asmt/Plan


Problems:  


(1) ICH (intracerebral hemorrhage)


(2) Chronic vegetative state


(3) Feeding by G-tube


(4) Chronic respiratory failure


(5) Hyponatremia


(6) Bacteremia


(7) Fungemia


Respiratory:  monitor respiratory rate, adjust FIO2, CXR


Cardiac:  stop pressors, continue to monitor HR/BP


Renal:  F/U I&O, keep IV fluid, check electrolytes


Infectious Disease:  check cultures


Gastrointestinal:  continue feedings/current rate


Endocrine:  monitor blood sugar, continue sliding scale insulin


Hematologic:  monitor H/H


Neurologic:  PRN Ativan, PRN Morphine, keep patient comfortable


Prophylaxis:  Protonix


Disposition:  keep in ICU


Time Spent (Minutes):  40


Notes Reviewed:  cardio, renal


Discussed with:  nurses, consultants, 





Critical Care - Objective





Last 24 Hour Vital Signs








  Date Time  Temp Pulse Resp B/P (MAP) Pulse Ox O2 Delivery O2 Flow Rate FiO2


 


8/20/20 05:20  122 21     35


 


8/20/20 04:00        35


 


8/20/20 04:00  99      


 


8/20/20 04:00      Mechanical Ventilator  


 


8/20/20 04:00 97.9 114 18 116/82 (93) 100   


 


8/20/20 03:04  128 28     35


 


8/20/20 00:32  78 15     35


 


8/20/20 00:00      Mechanical Ventilator  


 


8/20/20 00:00  80      


 


8/20/20 00:00 98.1 100 18 141/81 (101) 100   


 


8/19/20 23:11  111 21     35


 


8/19/20 21:08  109 21     35


 


8/19/20 20:44 99.9       


 


8/19/20 20:00  102      


 


8/19/20 20:00        35


 


8/19/20 20:00      Mechanical Ventilator  


 


8/19/20 20:00 100.4 110 20 136/89 (105) 100   


 


8/19/20 18:33  115 26     35


 


8/19/20 17:05  124 23     35


 


8/19/20 16:00 98.2 95 21 120/96 (104) 100   


 


8/19/20 16:00      Mechanical Ventilator  


 


8/19/20 16:00  95      


 


8/19/20 16:00        35


 


8/19/20 15:15  70 19     35


 


8/19/20 13:05  122 26     35


 


8/19/20 12:00        35


 


8/19/20 12:00  79      


 


8/19/20 12:00      Mechanical Ventilator  


 


8/19/20 12:00 97.5 91 17 134/83 (100) 100   








Status:  awake


Condition:  grave


Neck:  full ROM


Heart:  HR/BP stable, HR/BP unstable


Abdomen:  soft, non-tender


Extremities:  no C/C/E


Micro:





Microbiology








 Date/Time


Source Procedure


Growth Status


 


 


 8/17/20 15:00


Stool Clostridium difficile Toxin Assay - Final Complete











Critical Care - Subjective


FI02:  35


Vent Support Breath Rate:  10


Vent Support Mode:  AC


Vent Tidal Volume:  400


Sputum Amount:  Small


PEEP:  5.0


PIP:  25


I&O:











Intake and Output  


 


 8/19/20 8/20/20





 19:00 07:00


 


Intake Total 1275 ml 713.4 ml


 


Output Total 400 ml 850 ml


 


Balance 875 ml -136.6 ml


 


  


 


IV Total 1275 ml 713.4 ml


 


Output Urine Total 400 ml 850 ml


 


# Bowel Movements 6 4








Labs:





Laboratory Tests








Test


  8/20/20


03:20


 


White Blood Count


  5.7 K/UL


(4.8-10.8)


 


Red Blood Count


  2.72 M/UL


(4.20-5.40)  L


 


Hemoglobin


  8.0 G/DL


(12.0-16.0)  L


 


Hematocrit


  24.5 %


(37.0-47.0)  L


 


Mean Corpuscular Volume 90 FL (80-99)  


 


Mean Corpuscular Hemoglobin


  29.5 PG


(27.0-31.0)


 


Mean Corpuscular Hemoglobin


Concent 32.8 G/DL


(32.0-36.0)


 


Red Cell Distribution Width


  13.2 %


(11.6-14.8)


 


Platelet Count


  172 K/UL


(150-450)


 


Mean Platelet Volume


  6.6 FL


(6.5-10.1)


 


Neutrophils (%) (Auto)


  82.1 %


(45.0-75.0)  H


 


Lymphocytes (%) (Auto)


  10.0 %


(20.0-45.0)  L


 


Monocytes (%) (Auto)


  4.5 %


(1.0-10.0)


 


Eosinophils (%) (Auto)


  2.6 %


(0.0-3.0)


 


Basophils (%) (Auto)


  0.8 %


(0.0-2.0)


 


Sodium Level


  137 MMOL/L


(136-145)


 


Potassium Level


  3.5 MMOL/L


(3.5-5.1)


 


Chloride Level


  106 MMOL/L


()


 


Carbon Dioxide Level


  25 MMOL/L


(21-32)


 


Anion Gap


  6 mmol/L


(5-15)


 


Blood Urea Nitrogen


  48 mg/dL


(7-18)  H


 


Creatinine


  1.0 MG/DL


(0.55-1.30)


 


Estimat Glomerular Filtration


Rate 52.5 mL/min


(>60)


 


Glucose Level


  83 MG/DL


()


 


Calcium Level


  7.6 MG/DL


(8.5-10.1)  L

















Shiela Causey MD Aug 20, 2020 10:48

## 2020-08-20 NOTE — NUR
CASE MANAGEMENT: REVIEW



08/20/20



SI: ANEMIA . XUAN . PNA . TRACH

97.9   99   114   18   116/82   100% MECH VENT FIO2 35

H/H 8/24.5   BUN 48   CA+7.6

OB STOOL ~IN PROCESS

OB STOOL +



IS: LASIX IV QD

IV AMPICILLIN TID

IV MEROPENEM BID

IV MYCAMINE QD

K-DUR GT BID

IV PROTONIX BID 

~COLONOSCOPY TODAY



\**2w step down unit 

DCP:EBENEZER CHANG WHEN STABLE

## 2020-08-20 NOTE — NUR
NURSE HAND-OFF REPORT: 



Important Events on Shift: colon prep for colonoscopy in AM; possible CT abx

Patient Status: Stable

Diet: Npo



Pending Orders: N

Pending Results/Labs:N

Pending MD notification:N



Latest Vital Signs: Temperature 97.9 , Pulse 122 , B/P 116 /82 , Respiratory Rate 21 , O2 
 , Mechanical Ventilator, O2 Flow Rate .  

Vital Sign Comment: 



EKG Rhythm: Atrial Fibrillation with RVR with VC

Rhythm change?: Y

MD Notified?: Yes

MD Response: Left message



Latest Wilkinson Fall Score: 70  

Fall Risk: High Risk 

Safety Measures: Call light Within Reach, Bed Alarm Zone 1, Side Rails Side Rails x3, Bed 
position Low and Locked.

Fall Precautions: 

Yellow Socks

Yellow Gown

Patient Fall Education



Report given to ROSALBA Vincent.

## 2020-08-20 NOTE — GENERAL PROGRESS NOTE
Assessment/Plan


Problem List:  


(1) Feeding by G-tube


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


SNOMED:  702799162, 363970608, 494913087


(2) Chronic respiratory failure


ICD Codes:  J96.10 - Chronic respiratory failure, unspecified whether with 

hypoxia or hypercapnia


SNOMED:  40522732


(3) Hyponatremia


ICD Codes:  E87.1 - Hypo-osmolality and hyponatremia


SNOMED:  80463155


(4) UTI (urinary tract infection)


ICD Codes:  N39.0 - Urinary tract infection, site not specified


SNOMED:  05604976


(5) Hypotension


ICD Codes:  I95.9 - Hypotension, unspecified


SNOMED:  19241287


(6) NSTEMI (non-ST elevated myocardial infarction)


ICD Codes:  I21.4 - Non-ST elevation (NSTEMI) myocardial infarction


SNOMED:  70736264


(7) Anemia


ICD Codes:  D64.9 - Anemia, unspecified


SNOMED:  968493779


(8) XUAN (acute kidney injury)


ICD Codes:  N17.9 - Acute kidney failure, unspecified


SNOMED:  10581430, 2098060


Assessment/Plan:


ppi


abx per ID


monitor H&H


s/p EGD 


stool ob positive


pending CT today


plan for colonoscopy tomorrow





Subjective


ROS Limited/Unobtainable:  No


Allergies:  


Coded Allergies:  


     No Known Allergies (Unverified , 8/16/20)





Objective





Last 24 Hour Vital Signs








  Date Time  Temp Pulse Resp B/P (MAP) Pulse Ox O2 Delivery O2 Flow Rate FiO2


 


8/20/20 05:20  122 21     35


 


8/20/20 04:00        35


 


8/20/20 04:00  99      


 


8/20/20 04:00      Mechanical Ventilator  


 


8/20/20 04:00 97.9 114 18 116/82 (93) 100   


 


8/20/20 03:04  128 28     35


 


8/20/20 00:32  78 15     35


 


8/20/20 00:00      Mechanical Ventilator  


 


8/20/20 00:00  80      


 


8/20/20 00:00 98.1 100 18 141/81 (101) 100   


 


8/19/20 23:11  111 21     35


 


8/19/20 21:08  109 21     35


 


8/19/20 20:44 99.9       


 


8/19/20 20:00  102      


 


8/19/20 20:00        35


 


8/19/20 20:00      Mechanical Ventilator  


 


8/19/20 20:00 100.4 110 20 136/89 (105) 100   


 


8/19/20 18:33  115 26     35


 


8/19/20 17:05  124 23     35


 


8/19/20 16:00 98.2 95 21 120/96 (104) 100   


 


8/19/20 16:00      Mechanical Ventilator  


 


8/19/20 16:00  95      


 


8/19/20 16:00        35


 


8/19/20 15:15  70 19     35


 


8/19/20 13:05  122 26     35


 


8/19/20 12:00        35


 


8/19/20 12:00  79      


 


8/19/20 12:00      Mechanical Ventilator  


 


8/19/20 12:00 97.5 91 17 134/83 (100) 100   

















Intake and Output  


 


 8/19/20 8/20/20





 19:00 07:00


 


Intake Total 1275 ml 713.4 ml


 


Output Total 400 ml 850 ml


 


Balance 875 ml -136.6 ml


 


  


 


IV Total 1275 ml 713.4 ml


 


Output Urine Total 400 ml 850 ml


 


# Bowel Movements 6 4








Laboratory Tests


8/20/20 03:20: 


White Blood Count 5.7, Red Blood Count 2.72L, Hemoglobin 8.0L, Hematocrit 24.5L

, Mean Corpuscular Volume 90, Mean Corpuscular Hemoglobin 29.5, Mean 

Corpuscular Hemoglobin Concent 32.8, Red Cell Distribution Width 13.2, Platelet 

Count 172, Mean Platelet Volume 6.6, Neutrophils (%) (Auto) 82.1H, Lymphocytes (

%) (Auto) 10.0L, Monocytes (%) (Auto) 4.5, Eosinophils (%) (Auto) 2.6, 

Basophils (%) (Auto) 0.8, Sodium Level 137, Potassium Level 3.5, Chloride Level 

106, Carbon Dioxide Level 25, Anion Gap 6, Blood Urea Nitrogen 48H, Creatinine 

1.0, Estimat Glomerular Filtration Rate 52.5, Glucose Level 83, Calcium Level 

7.6L


Height (Feet):  5


Height (Inches):  1.00


Weight (Pounds):  154


General Appearance:  no apparent distress


EENT:  normal ENT inspection


Neck:  supple


Cardiovascular:  normal rate


Respiratory/Chest:  decreased breath sounds


Abdomen:  normal bowel sounds, non tender, soft


Extremities:  non-tender











George Mir MD Aug 20, 2020 11:07

## 2020-08-20 NOTE — SURGERY PROGRESS NOTE
Surgery Progress Note


Subjective


Additional Comments


leukocytosis improved


labs noted


exam stable


comfortable 


no n/v/f/c





Objective





Last 24 Hour Vital Signs








  Date Time  Temp Pulse Resp B/P (MAP) Pulse Ox O2 Delivery O2 Flow Rate FiO2


 


8/20/20 15:20  114 20     35


 


8/20/20 13:25  114 25     35


 


8/20/20 12:30  111 20 150/98 (115) 100   


 


8/20/20 12:00        35


 


8/20/20 12:00      Mechanical Ventilator  


 


8/20/20 12:00 98.2 109 22 149/102 (118) 100   


 


8/20/20 11:47  123      


 


8/20/20 09:10  132 21     35


 


8/20/20 08:00 97.9 134 24 139/94 (109) 100   


 


8/20/20 08:00      Mechanical Ventilator  


 


8/20/20 08:00        35


 


8/20/20 07:56  124      


 


8/20/20 07:30  142 22     35


 


8/20/20 05:20  122 21     35


 


8/20/20 04:00        35


 


8/20/20 04:00  99      


 


8/20/20 04:00      Mechanical Ventilator  


 


8/20/20 04:00 97.9 114 18 116/82 (93) 100   


 


8/20/20 03:04  128 28     35


 


8/20/20 00:32  78 15     35


 


8/20/20 00:00      Mechanical Ventilator  


 


8/20/20 00:00  80      


 


8/20/20 00:00 98.1 100 18 141/81 (101) 100   


 


8/19/20 23:11  111 21     35


 


8/19/20 21:08  109 21     35


 


8/19/20 20:44 99.9       


 


8/19/20 20:00  102      


 


8/19/20 20:00        35


 


8/19/20 20:00      Mechanical Ventilator  


 


8/19/20 20:00 100.4 110 20 136/89 (105) 100   


 


8/19/20 18:33  115 26     35


 


8/19/20 17:05  124 23     35








I&O











Intake and Output  


 


 8/19/20 8/20/20





 19:00 07:00


 


Intake Total 1275 ml 713.4 ml


 


Output Total 400 ml 850 ml


 


Balance 875 ml -136.6 ml


 


  


 


IV Total 1275 ml 713.4 ml


 


Output Urine Total 400 ml 850 ml


 


# Bowel Movements 6 4








Dressing:  other


Wound:  other


Drains:  other


Cardiovascular:  RSR


Respiratory:  decreased breath sounds


Abdomen:  soft, non-tender, present bowel sounds, non-distended


Extremities:  no cyanosis





Laboratory Tests








Test


  8/20/20


03:20


 


White Blood Count


  5.7 K/UL


(4.8-10.8)


 


Red Blood Count


  2.72 M/UL


(4.20-5.40)  L


 


Hemoglobin


  8.0 G/DL


(12.0-16.0)  L


 


Hematocrit


  24.5 %


(37.0-47.0)  L


 


Mean Corpuscular Volume 90 FL (80-99)  


 


Mean Corpuscular Hemoglobin


  29.5 PG


(27.0-31.0)


 


Mean Corpuscular Hemoglobin


Concent 32.8 G/DL


(32.0-36.0)


 


Red Cell Distribution Width


  13.2 %


(11.6-14.8)


 


Platelet Count


  172 K/UL


(150-450)


 


Mean Platelet Volume


  6.6 FL


(6.5-10.1)


 


Neutrophils (%) (Auto)


  82.1 %


(45.0-75.0)  H


 


Lymphocytes (%) (Auto)


  10.0 %


(20.0-45.0)  L


 


Monocytes (%) (Auto)


  4.5 %


(1.0-10.0)


 


Eosinophils (%) (Auto)


  2.6 %


(0.0-3.0)


 


Basophils (%) (Auto)


  0.8 %


(0.0-2.0)


 


Sodium Level


  137 MMOL/L


(136-145)


 


Potassium Level


  3.5 MMOL/L


(3.5-5.1)


 


Chloride Level


  106 MMOL/L


()


 


Carbon Dioxide Level


  25 MMOL/L


(21-32)


 


Anion Gap


  6 mmol/L


(5-15)


 


Blood Urea Nitrogen


  48 mg/dL


(7-18)  H


 


Creatinine


  1.0 MG/DL


(0.55-1.30)


 


Estimat Glomerular Filtration


Rate 52.5 mL/min


(>60)


 


Glucose Level


  83 MG/DL


()


 


Calcium Level


  7.6 MG/DL


(8.5-10.1)  L











Plan


Problems:  


(1) Sepsis


(2) Nosocomial pneumonia


(3) Hyponatremia


(4) UTI (urinary tract infection)


(5) Hypotension


(6) Chronic respiratory failure


Assessment & Plan:  87F ill appearing trach on vent


trach evaluated and 8f xlt noted


sutures in place causing tension and breakdown


sutures removed at bedside


trach stoma evaluated and with breakdown.  dressings applied


trach in place and functional


cont vent support


will monitor and follow with recs


thank you 





Pt presented on admission with Tracheostomy ,Generalized edema, Multiple 

Pressure injuries.


DTPI that is de-capped L Scapula(L)3.4cmx (W)7.3cm. Base of wound is 90% slough 

10% Loose purpuric base and edges. Surrounding non-blanching erythema.


Inferior L scapula ,but in close proximity is DTPI (L)1.9cm x (W)4.4cm. Base of 

wound is purpuric with Maroon borders. 


Within close proximity to both wounds #3 linear purpuric areas that fans from 

scapula area outward towards thoracic spine.


DTPI Sacrococcygeal to L Buttocks (L)7.4cm x (W)4.3cm.Base of wound is purpuric 

and indurated with surrounding non-blanching erythema.


Two Partial thickness pressure injuries noted to L buttocks(Proximal)2.5cm x (W)

4.6cm. Base of wound is moist and viable.Surrounding Non-Blanching erythema.(

Distal)1cm x (W)1cm. Base of wound is moist and viable.Surrounding Non-

Blanching erythema.


Perineal area is erythematous and moist . A purpuric area that is fluctuant 

noted at Perineum.


Bilat lower ext are edematous. Pt noted to have a large serous filled Bulla R 

Heel(L)11.5cm x (W)11.5cm.Surrounding area of heel is firm and pink. A second 

smaller serous Bulla noted to dorso/lateral R foot (L)2cm x(W)2.2cm.Scattered 

small purpuric areas noted to heads of 2nd ,3rd,4th metatarsals.


Multiple purpuric areas noted to distal/lateral L tibia and L foot.


Elongated Purpuric Area with Marginal erythema along edges noted to distal/

Lateral L tibia(L)7cm x (W)1.8cm.


L Heel /L Foot is an irregular shaped Purpuric area with marginal erythema 

without fluctuance/induration (L)3cm x (W)9.5cm. 


L Lateral Malleolus DTPI(L)1.5cm x (W)1.9cm.. Base of injury is fluctuant, 

purpuric with Maroon borders.


DTPI distal/lateral L foot (L)1.5cm x (W)1.5cm. Base of injury is maroon with 

fluctuance.


L Heel is boggy with non-blanchable erythema.





Tx.Plan: Cleanse wound L Scapula with Saline. Apply Therahoney. Apply Cavilon 

Skin Barrier Periwound. Cover with Optifoam 


            Drsg every 3 days and prn.


            Apply Cavilon Skin Barrier to Purpuric areas L Scapula. Cover with 

Optifoam drsg every 3 days and prn.


            Apply Moisture Barrier Paste to Sacrum and L Buttocks. Cover each 

site with Optifoam drsgs. Change every 3 days


            and PRN.


            Apply Cavilon Skin Barrier Spray to Blisters R foot. Cover with ABD 

Pads and wrap with Kerlix every 3 days and prn.


            Apply Cavilon Barrier Spray To Purpuric areas Distal L tibia and L 

foot. Cover with ABD Pads and wrap with Kerlix 


            every 3 days and prn.


            Reposition at least every 2hors or as tolerated .


            Off-load heels with Pillow.


            Place Pillow between knees.


            APM/HEATHER Mattress.








(7) Anemia


(8) NSTEMI (non-ST elevated myocardial infarction)


(9) Feeding by G-tube


Assessment & Plan:  DAILY ESTIMATED NEEDS:


Needs based on wound, critical care 47.5kg abw 


25-30  kcals/kg 


1378-8738  total kcals


1.25-2  g protein/kg


59-95  g total protein 


25-30ml/kcal   mL/kg


3369-0799  total fluid mLs





NUTRITION DIAGNOSIS:


Swallowing difficulty r/t respiratory status as evidenced by pt is trach


and peg dep.





CURRENT TF: NPO for EGD  





ENTERAL NUTRITION RECOMMENDATIONS:


Osmolite 1.2 goal of 45ml/hr x24 hrs + Prosource 1 pack daily    to provide 

1080ml, 1296 kcal, 60g + 11g pro, 886ml free H2O  





- As medically able, start Osmolite low fiber TF @low rate 25ml/hr for 6 hrs.


- Advance as tolerated 10ml/hr q4-6 hrs to goal


- Flush per MD/ HOB over 30 degrees


- Add Prosource 1 pack daily to better meet est pro needs.








ADDITIONAL RECOMMENDATIONS:


1) F/up w/ WC eval, add KEVIN BID w/ TF order 


2) Per SNF : 4'8" (56 inches) and 142 lbs/64.55kg 


3) Monitor renal function, lytes; need for renal TF formula  


4) Monitor BG and need for carb control formula 


   -> rec accuchecks + niss  





(10) XUAN (acute kidney injury)


(11) Chronic vegetative state


(12) ICH (intracerebral hemorrhage)











Reymundo Driscoll Aug 20, 2020 16:20

## 2020-08-20 NOTE — NEPHROLOGY PROGRESS NOTE
Assessment/Plan


Problem List:  


(1) XUAN (acute kidney injury)


(2) NSTEMI (non-ST elevated myocardial infarction)


(3) Anemia


(4) Chronic respiratory failure


(5) Hypotension


(6) Hyponatremia


(7) ICH (intracerebral hemorrhage)


(8) Sepsis


Assessment





Acute renal failure, mainly prerenal picture


Non-STEMI: Troponin 2.1


Chronic ventilatory dependent respiratory failure, history of intracranial bleed


Septic shock with lactic acidosis, possible UTI, possible pneumonia


Severe hyponatremia


Severe anemia, history of GI bleed


Hypertension


GERD


Plan


August 20: Lab reviewed.  Serum creatinine normal.  Patient on Lasix.  Will 

stop IV fluid.  Will monitor renal parameters and electrolytes.


August 19: Labs reviewed.  Serum creatinine normalized.  Patient edematous.  

Will decrease IV fluid.  Will give Lasix IV 20 mg daily.  Potassium supplement.

  Continue to monitor renal parameters.  Discussed with ROSALBA Castanon.





Hydrate


Hold BP medications, blood pressure is low


IV Protonix, Carafate


Antibiotics per ID


Monitor renal parameters and electrolytes


Potassium supplement as needed


Transfusion as needed


Per consultants





Subjective


ROS Limited/Unobtainable:  Yes





Objective


Objective





Last 24 Hour Vital Signs








  Date Time  Temp Pulse Resp B/P (MAP) Pulse Ox O2 Delivery O2 Flow Rate FiO2


 


8/20/20 05:20  122 21     35


 


8/20/20 04:00        35


 


8/20/20 04:00  99      


 


8/20/20 04:00      Mechanical Ventilator  


 


8/20/20 04:00 97.9 114 18 116/82 (93) 100   


 


8/20/20 03:04  128 28     35


 


8/20/20 00:32  78 15     35


 


8/20/20 00:00      Mechanical Ventilator  


 


8/20/20 00:00  80      


 


8/20/20 00:00 98.1 100 18 141/81 (101) 100   


 


8/19/20 23:11  111 21     35


 


8/19/20 21:08  109 21     35


 


8/19/20 20:44 99.9       


 


8/19/20 20:00  102      


 


8/19/20 20:00        35


 


8/19/20 20:00      Mechanical Ventilator  


 


8/19/20 20:00 100.4 110 20 136/89 (105) 100   


 


8/19/20 18:33  115 26     35


 


8/19/20 17:05  124 23     35


 


8/19/20 16:00 98.2 95 21 120/96 (104) 100   


 


8/19/20 16:00      Mechanical Ventilator  


 


8/19/20 16:00  95      


 


8/19/20 16:00        35


 


8/19/20 15:15  70 19     35


 


8/19/20 13:05  122 26     35


 


8/19/20 12:00        35


 


8/19/20 12:00  79      


 


8/19/20 12:00      Mechanical Ventilator  


 


8/19/20 12:00 97.5 91 17 134/83 (100) 100   


 


8/19/20 10:44  84 16     35


 


8/19/20 10:11  98 14  98   


 


8/19/20 08:54  86 21     35

















Intake and Output  


 


 8/19/20 8/20/20





 19:00 07:00


 


Intake Total 1275 ml 713.4 ml


 


Output Total 400 ml 850 ml


 


Balance 875 ml -136.6 ml


 


  


 


IV Total 1275 ml 713.4 ml


 


Output Urine Total 400 ml 850 ml


 


# Bowel Movements 6 4








Laboratory Tests


8/19/20 09:30: Stool Occult Blood [Pending]


8/20/20 03:20: 


White Blood Count 5.7, Red Blood Count 2.72L, Hemoglobin 8.0L, Hematocrit 24.5L

, Mean Corpuscular Volume 90, Mean Corpuscular Hemoglobin 29.5, Mean 

Corpuscular Hemoglobin Concent 32.8, Red Cell Distribution Width 13.2, Platelet 

Count 172, Mean Platelet Volume 6.6, Neutrophils (%) (Auto) 82.1H, Lymphocytes (

%) (Auto) 10.0L, Monocytes (%) (Auto) 4.5, Eosinophils (%) (Auto) 2.6, 

Basophils (%) (Auto) 0.8, Sodium Level 137, Potassium Level 3.5, Chloride Level 

106, Carbon Dioxide Level 25, Anion Gap 6, Blood Urea Nitrogen 48H, Creatinine 

1.0, Estimat Glomerular Filtration Rate 52.5, Glucose Level 83, Calcium Level 

7.6L


Height (Feet):  5


Height (Inches):  1.00


Weight (Pounds):  154


General Appearance:  no apparent distress, lethargic


EENT:  other - Trach to vent


Cardiovascular:  tachycardia


Respiratory/Chest:  decreased breath sounds


Abdomen:  distended, other - PEG in place











Mando Garcia MD Aug 20, 2020 08:54

## 2020-08-20 NOTE — NUR
NURSE NOTES:

Called Dr. Redding for medication for elevated blood pressure, /104. Waiting for call 
back.

## 2020-08-20 NOTE — INFECTIOUS DISEASES PROG NOTE
Assessment/Plan





86yo F with:





Hypotension in setting of anemia to 6.0


Normal WBC


Afebrile, Tmax 99.7 --> Febrile to 100.4


Fungemia


   8/16 BCx +C.parapsilosis


   8/16 TTE wo apparent vegetations


   8/18 R fem CVC removed


   8/19 BCx p


   8/20 BCx p


GPC Bacteremia


   8/16 BCx 1/2 +E.faecalis (S-amp)


   MRSA nares neg


Possible UTI


   8/16 UA+, UCx >100k ESBL Kleb pna (R-levo @1)


Possible pna, vent dependent via trach


   8/16 CXR: Patchy opacities throughout the lungs which may represent 

pulmonary edema versus infectious/inflammatory process. Possible small pleural 

effusions.


   8/16 COVID rapid Ag neg x1


   8/17 COVID rapid Ag neg x1


Volume OL? BNP 16,470


NSTEMI, troponin 2.1


XUAN on CKD, Cr 1.8, improving





R/o C.dif neg 8/17





Vent dependent, FiO2 35%


S/p trach 5 days pta


H/o GIB


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





PMH: GIB, ICH, trach and vent dependent, HTN, GERD





Plan:


Cont ampicillin #2 (abx day #5/14) for E.faecalis bacteremia


Cont micafungin #3 for fungemia, duration TBD pending repeat BCx


Cont meropenem #3/3 (abx day #5/5) for ESBL Kleb in UCx - will stop tomorrow


   8/19 SP vanco #1


   8/18 SP Zosyn #2





?Source of polymicrobial bacteremia/fungemia, likely gut source given organisms


Going for colonoscopy today, if this is unremarkable then will need CT A/P w 

con to eval for source of bacteremia/fungemia





F/u repeat BCx 


Ensure all lines removed, PIVs exchanged





F/u BCx


Trend XUAN, improving





Monitor CBC/CMP


Monitor resp status


Monitor temp curve





D/w RN





Thank you for this consult. [facility] will continue to follow.





Subjective


Allergies:  


Coded Allergies:  


     No Known Allergies (Unverified , 8/16/20)





Tmax 100.4


Remains on vent, FiO2 35% satting 99%


NAD





Objective





Last 24 Hour Vital Signs








  Date Time  Temp Pulse Resp B/P (MAP) Pulse Ox O2 Delivery O2 Flow Rate FiO2


 


8/20/20 05:20  122 21     35


 


8/20/20 04:00        35


 


8/20/20 04:00  99      


 


8/20/20 03:04  128 28     35


 


8/20/20 00:32  78 15     35


 


8/20/20 00:00      Mechanical Ventilator  


 


8/20/20 00:00  80      


 


8/20/20 00:00 98.1 100 18 141/81 (101) 100   


 


8/19/20 23:11  111 21     35


 


8/19/20 21:08  109 21     35


 


8/19/20 20:44 99.9       


 


8/19/20 20:00  102      


 


8/19/20 20:00        35


 


8/19/20 20:00      Mechanical Ventilator  


 


8/19/20 20:00 100.4 110 20 136/89 (105) 100   


 


8/19/20 18:33  115 26     35


 


8/19/20 17:05  124 23     35


 


8/19/20 16:00 98.2 95 21 120/96 (104) 100   


 


8/19/20 16:00      Mechanical Ventilator  


 


8/19/20 16:00  95      


 


8/19/20 16:00        35


 


8/19/20 15:15  70 19     35


 


8/19/20 13:05  122 26     35


 


8/19/20 12:00        35


 


8/19/20 12:00  79      


 


8/19/20 12:00      Mechanical Ventilator  


 


8/19/20 12:00 97.5 91 17 134/83 (100) 100   


 


8/19/20 10:44  84 16     35


 


8/19/20 10:11  98 14  98   


 


8/19/20 08:54  86 21     35


 


8/19/20 08:49  100 10  99   


 


8/19/20 08:00        35


 


8/19/20 08:00 98.0 98 18 111/74 (86) 100   


 


8/19/20 08:00      Mechanical Ventilator  


 


8/19/20 07:56  90      


 


8/19/20 07:08  77 16     35


 


8/19/20 06:47 98.1       








Height (Feet):  5


Height (Inches):  1.00


Weight (Pounds):  155





Gen: Older woman, on vent, NAD


HEENT: Trach


CV: RRR


Pulm: CTAB on vent


Abd: Soft, NTND, +PEG


Neuro: Non-responsive


Lines: PIVs in BL hands





Microbiology








 Date/Time


Source Procedure


Growth Status


 


 


 8/17/20 09:05


Nasopharynx SARS-CoV-2 RdRp Gene Assay - Final Complete


 


 8/17/20 15:00


Stool Clostridium difficile Toxin Assay - Final Complete











Laboratory Tests








Test


  8/19/20


09:30 8/20/20


03:20


 


Stool Occult Blood Pending   


 


White Blood Count


  


  5.7 K/UL


(4.8-10.8)


 


Red Blood Count


  


  2.72 M/UL


(4.20-5.40)  L


 


Hemoglobin


  


  8.0 G/DL


(12.0-16.0)  L


 


Hematocrit


  


  24.5 %


(37.0-47.0)  L


 


Mean Corpuscular Volume  90 FL (80-99)  


 


Mean Corpuscular Hemoglobin


  


  29.5 PG


(27.0-31.0)


 


Mean Corpuscular Hemoglobin


Concent 


  32.8 G/DL


(32.0-36.0)


 


Red Cell Distribution Width


  


  13.2 %


(11.6-14.8)


 


Platelet Count


  


  172 K/UL


(150-450)


 


Mean Platelet Volume


  


  6.6 FL


(6.5-10.1)


 


Neutrophils (%) (Auto)


  


  82.1 %


(45.0-75.0)  H


 


Lymphocytes (%) (Auto)


  


  10.0 %


(20.0-45.0)  L


 


Monocytes (%) (Auto)


  


  4.5 %


(1.0-10.0)


 


Eosinophils (%) (Auto)


  


  2.6 %


(0.0-3.0)


 


Basophils (%) (Auto)


  


  0.8 %


(0.0-2.0)


 


Sodium Level


  


  137 MMOL/L


(136-145)


 


Potassium Level


  


  3.5 MMOL/L


(3.5-5.1)


 


Chloride Level


  


  106 MMOL/L


()


 


Carbon Dioxide Level


  


  25 MMOL/L


(21-32)


 


Anion Gap


  


  6 mmol/L


(5-15)


 


Blood Urea Nitrogen


  


  48 mg/dL


(7-18)  H


 


Creatinine


  


  1.0 MG/DL


(0.55-1.30)


 


Estimat Glomerular Filtration


Rate 


  52.5 mL/min


(>60)


 


Glucose Level


  


  83 MG/DL


()


 


Calcium Level


  


  7.6 MG/DL


(8.5-10.1)  L











Current Medications








 Medications


  (Trade)  Dose


 Ordered  Sig/Lisa


 Route


 PRN Reason  Start Time


 Stop Time Status Last Admin


Dose Admin


 


 Acetaminophen


  (Tylenol)  650 mg  Q6H  PRN


 GT


 For Headache  8/17/20 20:30


 9/16/20 20:29  8/19/20 20:14


 


 


 Ampicillin 2 gm/


 Sodium Chloride  110 ml @ 


 220 mls/hr  Q8H


 IVPB


   8/19/20 10:00


 8/26/20 09:59  8/20/20 02:54


 


 


 Barium Sulfate


  (Readi-Cat 2)  450 ml  NOW  PRN


 ORAL


 Radiology Procedure  8/19/20 07:15


 8/21/20 07:07   


 


 


 Dextrose/Sodium


 Chloride  1,000 ml @ 


 50 mls/hr  Q20H


 IV


   8/19/20 11:00


 9/15/20 10:59  8/20/20 02:55


 


 


 Docusate Sodium


  (Colace)  100 mg  TWICE A  DAY


 NG


   8/17/20 18:00


 9/16/20 17:59  8/19/20 09:13


 


 


 Furosemide


  (Lasix)  20 mg  DAILY


 IV


   8/20/20 09:00


 9/19/20 08:59   


 


 


 Iohexol


  (OMNIPAQUE-300


 100ml)  100 ml  NOW  PRN


 INJ


 Radiology Procedure  8/19/20 07:15


 8/21/20 07:07   


 


 


 Meropenem 500 mg/


 Sodium Chloride  55 ml @ 


 110 mls/hr  EVERY 12  HOURS


 IVPB


   8/18/20 13:00


 8/23/20 12:59  8/19/20 20:13


 


 


 Micafungin Sodium


 100 mg/Sodium


 Chloride  110 ml @ 


 110 mls/hr  Q24H


 IVPB


   8/18/20 20:00


 8/25/20 19:59  8/19/20 20:13


 


 


 Pantoprazole


  (Protonix)  40 mg  EVERY 12  HOURS


 IVP


   8/16/20 21:00


 9/15/20 20:59  8/19/20 20:14


 


 


 Potassium Chloride


  (K-Dur)  40 meq  TWICE A  DAY


 GT


   8/19/20 18:00


 11/17/20 17:59  8/19/20 17:48


 


 


 Sucralfate


  (Carafate)  1 gm  FOUR TIMES A  DAY


 GT


   8/16/20 18:00


 11/14/20 17:59  8/19/20 20:14


 

















Vianney Feldman M.D. Aug 20, 2020 06:47

## 2020-08-20 NOTE — HEMATOLOGY/ONC PROGRESS NOTE
Assessment/Plan


Assessment/Plan





Assessment and Recs


# Anemia rule out underlying gi bleed


--> anemia panel has been ordered, esr, ferritin, tibc, occult


--> s/p transfusion prbc 8/16


--> gi service if h/h downtrends


--> triple lumen has been placed


--> hgb 7.7-->8.1->8


# NSTEMI (non-ST elevated myocardial infarction) with trop elevation


-> on bb, off asa


--> seen by Dr. Redding


--> volume management as per cards/renal


--> trop downtrended


# XUAN (acute kidney injury)


--> cr 1.8-->1.5-->1


# Hyponatremia/Hypokalemoa


--> per renal recs


# Respiratory failure s/p trach


--> consulted pulm


# PNA on cxr


--> as per id recs


--> ABX amp/micaf/mahi


# Dysphagia s/p peg


# Dvt ppx scds





Appreciate consultant care, will follow





Subjective


Constitutional:  Denies: no symptoms, chills, fever, malaise, weakness, other


HEENT:  Denies: no symptoms, eye pain, blurred vision, tearing, double vision, 

ear pain, ear discharge, nose pain, nose congestion, throat pain, throat 

swelling, mouth pain, mouth swelling, other


Cardiovascular:  Denies: no symptoms, chest pain, edema, irregular heart rate, 

lightheadedness, palpitations, syncope, other


Gastrointestinal/Abdominal:  Denies: no symptoms, abdomen distended, abdominal 

pain, black stools, tarry stools, blood in stool, constipated, diarrhea, 

difficulty swallowing, nausea, poor appetite, poor fluid intake, rectal bleeding

, vomiting, other


Genitourinary:  Denies: no symptoms, burning, discharge, frequency, flank pain, 

hematuria, incontinence, pain, urgency, other


Neurologic/Psychiatric:  Denies: no symptoms, anxiety, depressed, emotional 

problems, headache, numbness, paresthesia, pre-existing deficit, seizure, 

tingling, tremors, weakness, other


Endocrine:  Denies: no symptoms, excessive sweating, flushing, intolerance to 

cold, intolerance to heat, increased hunger, increased thirst, increased urine, 

unexplained weight gain, unexplained weight loss, other


Hematologic/Lymphatic:  Denies: no symptoms, anemia, easy bleeding, easy 

bruising, adenopathy, other


Allergies:  


Coded Allergies:  


     No Known Allergies (Unverified , 8/16/20)


Subjective


8/18 remains obtunded, iraj rn, labs are noted, on vanc/zosyn, mild temp overnight


8/19 no bleeding or chills, on abx, remains confused, no night swaets


8/20 labs are noted, no bleeding, meds noted, cbc reviewed, no heoptysis





Objective


Objective





Current Medications








 Medications


  (Trade)  Dose


 Ordered  Sig/Lisa


 Route


 PRN Reason  Start Time


 Stop Time Status Last Admin


Dose Admin


 


 Acetaminophen


  (Tylenol)  650 mg  Q6H  PRN


 GT


 For Headache  8/17/20 20:30


 9/16/20 20:29  8/19/20 20:14


 


 


 Ampicillin 2 gm/


 Sodium Chloride  110 ml @ 


 220 mls/hr  Q8H


 IVPB


   8/19/20 10:00


 8/26/20 09:59  8/20/20 02:54


 


 


 Barium Sulfate


  (Readi-Cat 2)  450 ml  NOW  PRN


 ORAL


 Radiology Procedure  8/19/20 07:15


 8/21/20 07:07   


 


 


 Dextrose/Sodium


 Chloride  1,000 ml @ 


 50 mls/hr  Q20H


 IV


   8/19/20 11:00


 9/15/20 10:59  8/20/20 02:55


 


 


 Docusate Sodium


  (Colace)  100 mg  TWICE A  DAY


 NG


   8/17/20 18:00


 9/16/20 17:59  8/19/20 09:13


 


 


 Furosemide


  (Lasix)  20 mg  DAILY


 IV


   8/20/20 09:00


 9/19/20 08:59   


 


 


 Iohexol


  (OMNIPAQUE-300


 100ml)  100 ml  NOW  PRN


 INJ


 Radiology Procedure  8/19/20 07:15


 8/21/20 07:07   


 


 


 Meropenem 500 mg/


 Sodium Chloride  55 ml @ 


 110 mls/hr  EVERY 12  HOURS


 IVPB


   8/18/20 13:00


 8/23/20 12:59  8/19/20 20:13


 


 


 Micafungin Sodium


 100 mg/Sodium


 Chloride  110 ml @ 


 110 mls/hr  Q24H


 IVPB


   8/18/20 20:00


 8/25/20 19:59  8/19/20 20:13


 


 


 Pantoprazole


  (Protonix)  40 mg  EVERY 12  HOURS


 IVP


   8/16/20 21:00


 9/15/20 20:59  8/19/20 20:14


 


 


 Potassium Chloride


  (K-Dur)  40 meq  TWICE A  DAY


 GT


   8/19/20 18:00


 11/17/20 17:59  8/19/20 17:48


 


 


 Sucralfate


  (Carafate)  1 gm  FOUR TIMES A  DAY


 GT


   8/16/20 18:00


 11/14/20 17:59  8/19/20 20:14


 











Last 24 Hour Vital Signs








  Date Time  Temp Pulse Resp B/P (MAP) Pulse Ox O2 Delivery O2 Flow Rate FiO2


 


8/20/20 05:20  122 21     35


 


8/20/20 04:00        35


 


8/20/20 04:00  99      


 


8/20/20 03:04  128 28     35


 


8/20/20 00:32  78 15     35


 


8/20/20 00:00      Mechanical Ventilator  


 


8/20/20 00:00  80      


 


8/20/20 00:00 98.1 100 18 141/81 (101) 100   


 


8/19/20 23:11  111 21     35


 


8/19/20 21:08  109 21     35


 


8/19/20 20:44 99.9       


 


8/19/20 20:00  102      


 


8/19/20 20:00        35


 


8/19/20 20:00      Mechanical Ventilator  


 


8/19/20 20:00 100.4 110 20 136/89 (105) 100   


 


8/19/20 18:33  115 26     35


 


8/19/20 17:05  124 23     35


 


8/19/20 16:00 98.2 95 21 120/96 (104) 100   


 


8/19/20 16:00      Mechanical Ventilator  


 


8/19/20 16:00  95      


 


8/19/20 16:00        35


 


8/19/20 15:15  70 19     35


 


8/19/20 13:05  122 26     35


 


8/19/20 12:00        35


 


8/19/20 12:00  79      


 


8/19/20 12:00      Mechanical Ventilator  


 


8/19/20 12:00 97.5 91 17 134/83 (100) 100   


 


8/19/20 10:44  84 16     35


 


8/19/20 10:11  98 14  98   


 


8/19/20 08:54  86 21     35


 


8/19/20 08:49  100 10  99   


 


8/19/20 08:00        35


 


8/19/20 08:00 98.0 98 18 111/74 (86) 100   


 


8/19/20 08:00      Mechanical Ventilator  


 


8/19/20 07:56  90      


 


8/19/20 07:08  77 16     35


 


8/19/20 06:47 98.1       


 


8/19/20 04:32  101 19     35


 


8/19/20 04:00        35


 


8/19/20 04:00      Mechanical Ventilator  


 


8/19/20 04:00 99.6 113 20 144/89 (107) 100   


 


8/19/20 03:35  104      


 


8/19/20 02:35  71 27     35


 


8/19/20 00:33  122 25     35


 


8/19/20 00:00        35


 


8/19/20 00:00 98.2 112 22 131/82 (98) 99   


 


8/19/20 00:00      Mechanical Ventilator  


 


8/18/20 23:33  101      


 


8/18/20 22:51  110 22     35


 


8/18/20 21:07  96 17     35


 


8/18/20 20:00      Mechanical Ventilator  


 


8/18/20 20:00 98.6 99 18 143/78 (99) 100   


 


8/18/20 20:00        35


 


8/18/20 19:24  91      


 


8/18/20 18:30  120 19     35


 


8/18/20 17:00  118 18     35


 


8/18/20 16:00      Mechanical Ventilator  


 


8/18/20 16:00 98.0 98 19 115/61 (79) 100   


 


8/18/20 16:00        35


 


8/18/20 16:00  105      


 


8/18/20 15:16  122 21     35


 


8/18/20 13:20  73 19     35


 


8/18/20 12:00 98.0 104 19 115/70 (85) 100   


 


8/18/20 12:00      Mechanical Ventilator  


 


8/18/20 12:00  94      


 


8/18/20 12:00        35


 


8/18/20 10:30  120 19     35


 


8/18/20 09:00  94 17     35


 


8/18/20 08:00        35


 


8/18/20 08:00      Mechanical Ventilator  


 


8/18/20 08:00  94      


 


8/18/20 07:47 97.9 94 19 100/77 (85) 99   

















Intake and Output  


 


 8/19/20 8/20/20





 19:00 07:00


 


Intake Total 1275 ml 409.2 ml


 


Output Total 400 ml 


 


Balance 875 ml 409.2 ml


 


  


 


IV Total 1275 ml 409.2 ml


 


Output Urine Total 400 ml 


 


# Bowel Movements 6 2











Labs








Test


  8/17/20


15:00 8/18/20


02:50 8/19/20


02:50 8/19/20


09:30


 


Stool Occult Blood


  Positive


(NEGATIVE) 


  


  


 


 


White Blood Count


  


  6.8 K/UL


(4.8-10.8) 7.2 K/UL


(4.8-10.8) 


 


 


Red Blood Count


  


  2.61 M/UL


(4.20-5.40) 2.74 M/UL


(4.20-5.40) 


 


 


Hemoglobin


  


  7.7 G/DL


(12.0-16.0) 8.1 G/DL


(12.0-16.0) 


 


 


Hematocrit


  


  23.1 %


(37.0-47.0) 24.6 %


(37.0-47.0) 


 


 


Mean Corpuscular Volume  89 FL (80-99)  90 FL (80-99)  


 


Mean Corpuscular Hemoglobin


  


  29.6 PG


(27.0-31.0) 29.7 PG


(27.0-31.0) 


 


 


Mean Corpuscular Hemoglobin


Concent 


  33.4 G/DL


(32.0-36.0) 33.0 G/DL


(32.0-36.0) 


 


 


Red Cell Distribution Width


  


  12.8 %


(11.6-14.8) 12.9 %


(11.6-14.8) 


 


 


Platelet Count


  


  155 K/UL


(150-450) 167 K/UL


(150-450) 


 


 


Mean Platelet Volume


  


  8.1 FL


(6.5-10.1) 6.9 FL


(6.5-10.1) 


 


 


Neutrophils (%) (Auto)   % (45.0-75.0)   % (45.0-75.0)  


 


Lymphocytes (%) (Auto)   % (20.0-45.0)   % (20.0-45.0)  


 


Monocytes (%) (Auto)   % (1.0-10.0)   % (1.0-10.0)  


 


Eosinophils (%) (Auto)   % (0.0-3.0)   % (0.0-3.0)  


 


Basophils (%) (Auto)   % (0.0-2.0)   % (0.0-2.0)  


 


Sodium Level


  


  132 MMOL/L


(136-145) 137 MMOL/L


(136-145) 


 


 


Potassium Level


  


  3.2 MMOL/L


(3.5-5.1) 3.4 MMOL/L


(3.5-5.1) 


 


 


Chloride Level


  


  98 MMOL/L


() 102 MMOL/L


() 


 


 


Carbon Dioxide Level


  


  28 MMOL/L


(21-32) 26 MMOL/L


(21-32) 


 


 


Anion Gap


  


  6 mmol/L


(5-15) 9 mmol/L


(5-15) 


 


 


Blood Urea Nitrogen


  


  83 mg/dL


(7-18) 62 mg/dL


(7-18) 


 


 


Creatinine


  


  1.4 MG/DL


(0.55-1.30) 1.2 MG/DL


(0.55-1.30) 


 


 


Estimat Glomerular Filtration


Rate 


  35.5 mL/min


(>60) 42.5 mL/min


(>60) 


 


 


Glucose Level


  


  118 MG/DL


() 117 MG/DL


() 


 


 


Calcium Level


  


  7.1 MG/DL


(8.5-10.1) 7.4 MG/DL


(8.5-10.1) 


 


 


Phosphorus Level


  


  3.5 MG/DL


(2.5-4.9) 2.7 MG/DL


(2.5-4.9) 


 


 


Magnesium Level


  


  2.2 MG/DL


(1.8-2.4) 2.1 MG/DL


(1.8-2.4) 


 


 


Total Bilirubin


  


  0.8 MG/DL


(0.2-1.0) 1.0 MG/DL


(0.2-1.0) 


 


 


Aspartate Amino Transf


(AST/SGOT) 


  50 U/L (15-37) 


  46 U/L (15-37) 


  


 


 


Alanine Aminotransferase


(ALT/SGPT) 


  45 U/L (12-78) 


  47 U/L (12-78) 


  


 


 


Alkaline Phosphatase


  


  123 U/L


() 164 U/L


() 


 


 


C-Reactive Protein,


Quantitative 


  6.7 mg/dL


(0.00-0.90) 


  


 


 


Pro-B-Type Natriuretic Peptide


  


  34057 pg/mL


(0-125) 


  


 


 


Total Protein


  


  4.7 G/DL


(6.4-8.2) 5.2 G/DL


(6.4-8.2) 


 


 


Albumin


  


  1.9 G/DL


(3.4-5.0) 2.3 G/DL


(3.4-5.0) 


 


 


Globulin  2.8 g/dL  2.9 g/dL  


 


Albumin/Globulin Ratio  0.7 (1.0-2.7)  0.8 (1.0-2.7)  


 


Vitamin B12 Level


  


  1289 PG/ML


(193-986) 


  


 


 


Differential Total Cells


Counted 


  


  100 


  


 


 


Neutrophils % (Manual)   87 % (45-75)  


 


Lymphocytes % (Manual)   4 % (20-45)  


 


Monocytes % (Manual)   7 % (1-10)  


 


Eosinophils % (Manual)   1 % (0-3)  


 


Basophils % (Manual)   1 % (0-2)  


 


Band Neutrophils   0 % (0-8)  


 


Platelet Estimate   Adequate  


 


Platelet Morphology   Normal  


 


Hypochromasia   1+  


 


Test


  8/20/20


03:20 


  


  


 


 


White Blood Count


  5.7 K/UL


(4.8-10.8) 


  


  


 


 


Red Blood Count


  2.72 M/UL


(4.20-5.40) 


  


  


 


 


Hemoglobin


  8.0 G/DL


(12.0-16.0) 


  


  


 


 


Hematocrit


  24.5 %


(37.0-47.0) 


  


  


 


 


Mean Corpuscular Volume 90 FL (80-99)    


 


Mean Corpuscular Hemoglobin


  29.5 PG


(27.0-31.0) 


  


  


 


 


Mean Corpuscular Hemoglobin


Concent 32.8 G/DL


(32.0-36.0) 


  


  


 


 


Red Cell Distribution Width


  13.2 %


(11.6-14.8) 


  


  


 


 


Platelet Count


  172 K/UL


(150-450) 


  


  


 


 


Mean Platelet Volume


  6.6 FL


(6.5-10.1) 


  


  


 


 


Neutrophils (%) (Auto)


  82.1 %


(45.0-75.0) 


  


  


 


 


Lymphocytes (%) (Auto)


  10.0 %


(20.0-45.0) 


  


  


 


 


Monocytes (%) (Auto)


  4.5 %


(1.0-10.0) 


  


  


 


 


Eosinophils (%) (Auto)


  2.6 %


(0.0-3.0) 


  


  


 


 


Basophils (%) (Auto)


  0.8 %


(0.0-2.0) 


  


  


 


 


Sodium Level


  137 MMOL/L


(136-145) 


  


  


 


 


Potassium Level


  3.5 MMOL/L


(3.5-5.1) 


  


  


 


 


Chloride Level


  106 MMOL/L


() 


  


  


 


 


Carbon Dioxide Level


  25 MMOL/L


(21-32) 


  


  


 


 


Anion Gap


  6 mmol/L


(5-15) 


  


  


 


 


Blood Urea Nitrogen


  48 mg/dL


(7-18) 


  


  


 


 


Creatinine


  1.0 MG/DL


(0.55-1.30) 


  


  


 


 


Estimat Glomerular Filtration


Rate 52.5 mL/min


(>60) 


  


  


 


 


Glucose Level


  83 MG/DL


() 


  


  


 


 


Calcium Level


  7.6 MG/DL


(8.5-10.1) 


  


  


 








Height (Feet):  5


Height (Inches):  1.00


Weight (Pounds):  155


Objective


PE


General: Obtunded, no spontaneous movement.  No obvious distress


HEENT: NC/AT. 


Neck: Tracheostomy appears appropriately placed


Cardiovascular: RRR.  S1 and S2 normal.  No murmur appreciated


Resp: Normal work of breathing. No cough


Abdomen: Abdomen is soft, nondistended.  Gastrostomy tube appears in 

appropriate position without surrounding signs of infection or trauma.


Skin: Intact.  No abrasions, laceration or rash over the exposed skin


MSK: Normal tone and bulk.


Neuro: Obtunded











Felix Barriga MD Aug 20, 2020 06:55

## 2020-08-20 NOTE — NUR
*-* INSURANCE *-*



UPDATED CLINICALS AND REVIEWS HAVE BEEN FAXED TO:



[facility]

FAX CLINICALS TO [facility]

P:[phone]

F:[phone]

## 2020-08-20 NOTE — CARDIAC ELECTROPHYSIOLOGY PN
Assessment/Plan


Assessment/Plan


1. Non-ST elevation myocardial infarction with troponin of 3.2, down to 2 and 

1.9. 


Due to demand ischemia in view hemoglobin of only 6 as


well as renal failure. Her EKG shows sinus rhythm with nonspecific ST-T


wave abnormality and no ST elevation. 





2. Volume overload.  BNP of 19,000 as well as 4+ edema.  


Likely intravascularly depleted as her BUN is 112 and


creatinine 1.8.  Improved to 62/1.2. Fu  by Dr. Garcia.





3. Severe hyponatremia, Sodium 125.





4. S/P Septic shock with lactic acidosis and Hb 6. 





5. 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


6. Dysphagia, status post PEG placement.


7. Acute renal failure.


8.  Severe Anemia. Got PRBC and is on IV antibiotic per ID. S/P EGD. 


Colonscopy rescheduled for tomorrow





DW RN and Dr Casillas





Subjective


Subjective


Remained in atrial fib in 70s. Had katt down to 30s and tachy episodes as 

well.  On 35% Fio2 on Vent via Trach.


    EGD yesterday was negative. Colonoscopy rescheduled for  tomorrow. Abd CT 

pending.





Objective





Last 24 Hour Vital Signs








  Date Time  Temp Pulse Resp B/P (MAP) Pulse Ox O2 Delivery O2 Flow Rate FiO2


 


8/20/20 15:20  114 20     35


 


8/20/20 13:25  114 25     35


 


8/20/20 12:30  111 20 150/98 (115) 100   


 


8/20/20 12:00        35


 


8/20/20 12:00      Mechanical Ventilator  


 


8/20/20 12:00 98.2 109 22 149/102 (118) 100   


 


8/20/20 11:47  123      


 


8/20/20 09:10  132 21     35


 


8/20/20 08:00 97.9 134 24 139/94 (109) 100   


 


8/20/20 08:00      Mechanical Ventilator  


 


8/20/20 08:00        35


 


8/20/20 07:56  124      


 


8/20/20 07:30  142 22     35


 


8/20/20 05:20  122 21     35


 


8/20/20 04:00        35


 


8/20/20 04:00  99      


 


8/20/20 04:00      Mechanical Ventilator  


 


8/20/20 04:00 97.9 114 18 116/82 (93) 100   


 


8/20/20 03:04  128 28     35


 


8/20/20 00:32  78 15     35


 


8/20/20 00:00      Mechanical Ventilator  


 


8/20/20 00:00  80      


 


8/20/20 00:00 98.1 100 18 141/81 (101) 100   


 


8/19/20 23:11  111 21     35


 


8/19/20 21:08  109 21     35


 


8/19/20 20:44 99.9       


 


8/19/20 20:00  102      


 


8/19/20 20:00        35


 


8/19/20 20:00      Mechanical Ventilator  


 


8/19/20 20:00 100.4 110 20 136/89 (105) 100   


 


8/19/20 18:33  115 26     35


 


8/19/20 17:05  124 23     35

















Intake and Output  


 


 8/19/20 8/20/20





 19:00 07:00


 


Intake Total 1275 ml 713.4 ml


 


Output Total 400 ml 850 ml


 


Balance 875 ml -136.6 ml


 


  


 


IV Total 1275 ml 713.4 ml


 


Output Urine Total 400 ml 850 ml


 


# Bowel Movements 6 4











Laboratory Tests








Test


  8/20/20


03:20


 


White Blood Count


  5.7 K/UL


(4.8-10.8)


 


Red Blood Count


  2.72 M/UL


(4.20-5.40)  L


 


Hemoglobin


  8.0 G/DL


(12.0-16.0)  L


 


Hematocrit


  24.5 %


(37.0-47.0)  L


 


Mean Corpuscular Volume 90 FL (80-99)  


 


Mean Corpuscular Hemoglobin


  29.5 PG


(27.0-31.0)


 


Mean Corpuscular Hemoglobin


Concent 32.8 G/DL


(32.0-36.0)


 


Red Cell Distribution Width


  13.2 %


(11.6-14.8)


 


Platelet Count


  172 K/UL


(150-450)


 


Mean Platelet Volume


  6.6 FL


(6.5-10.1)


 


Neutrophils (%) (Auto)


  82.1 %


(45.0-75.0)  H


 


Lymphocytes (%) (Auto)


  10.0 %


(20.0-45.0)  L


 


Monocytes (%) (Auto)


  4.5 %


(1.0-10.0)


 


Eosinophils (%) (Auto)


  2.6 %


(0.0-3.0)


 


Basophils (%) (Auto)


  0.8 %


(0.0-2.0)


 


Sodium Level


  137 MMOL/L


(136-145)


 


Potassium Level


  3.5 MMOL/L


(3.5-5.1)


 


Chloride Level


  106 MMOL/L


()


 


Carbon Dioxide Level


  25 MMOL/L


(21-32)


 


Anion Gap


  6 mmol/L


(5-15)


 


Blood Urea Nitrogen


  48 mg/dL


(7-18)  H


 


Creatinine


  1.0 MG/DL


(0.55-1.30)


 


Estimat Glomerular Filtration


Rate 52.5 mL/min


(>60)


 


Glucose Level


  83 MG/DL


()


 


Calcium Level


  7.6 MG/DL


(8.5-10.1)  L











Microbiology








 Date/Time


Source Procedure


Growth Status


 


 


 8/17/20 17:30


Femoral Right Lower Lobe Catheter Tip Culture - Preliminary


Gram Negative Bacillus 1 Resulted








Objective


HEAD AND NECK:  Status post tracheostomy, which is fresh.


LUNGS:  Coarse rhonchi.


CARDIOVASCULAR:  Regular S1 and S2 with no gallop.


ABDOMEN:  Status post G-tube.


EXTREMITIES:   3+ pitting edema.











Antione Redding MD Aug 20, 2020 16:16

## 2020-08-20 NOTE — NUR
NURSE NOTES:

Left message for Dr. George in regards to HR of 141-156 bpm. No PRN orders observed. 
Advised by CN to hold PRBC until vitals are stable. Notified and left message for Dr. Casillas. Awaiting call back.

## 2020-08-21 VITALS — DIASTOLIC BLOOD PRESSURE: 96 MMHG | SYSTOLIC BLOOD PRESSURE: 140 MMHG

## 2020-08-21 VITALS — DIASTOLIC BLOOD PRESSURE: 111 MMHG | SYSTOLIC BLOOD PRESSURE: 172 MMHG

## 2020-08-21 VITALS — SYSTOLIC BLOOD PRESSURE: 145 MMHG | DIASTOLIC BLOOD PRESSURE: 99 MMHG

## 2020-08-21 VITALS — SYSTOLIC BLOOD PRESSURE: 156 MMHG | DIASTOLIC BLOOD PRESSURE: 97 MMHG

## 2020-08-21 VITALS — DIASTOLIC BLOOD PRESSURE: 113 MMHG | SYSTOLIC BLOOD PRESSURE: 173 MMHG

## 2020-08-21 VITALS — DIASTOLIC BLOOD PRESSURE: 98 MMHG | SYSTOLIC BLOOD PRESSURE: 170 MMHG

## 2020-08-21 LAB
ADD MANUAL DIFF: NO
ALBUMIN SERPL-MCNC: 2 G/DL (ref 3.4–5)
ALBUMIN/GLOB SERPL: 0.6 {RATIO} (ref 1–2.7)
ALP SERPL-CCNC: 113 U/L (ref 46–116)
ALT SERPL-CCNC: 40 U/L (ref 12–78)
ANION GAP SERPL CALC-SCNC: 11 MMOL/L (ref 5–15)
AST SERPL-CCNC: 38 U/L (ref 15–37)
BASOPHILS NFR BLD AUTO: 0.8 % (ref 0–2)
BILIRUB SERPL-MCNC: 0.8 MG/DL (ref 0.2–1)
BUN SERPL-MCNC: 43 MG/DL (ref 7–18)
CALCIUM SERPL-MCNC: 8.1 MG/DL (ref 8.5–10.1)
CHLORIDE SERPL-SCNC: 108 MMOL/L (ref 98–107)
CO2 SERPL-SCNC: 24 MMOL/L (ref 21–32)
CREAT SERPL-MCNC: 1.1 MG/DL (ref 0.55–1.3)
EOSINOPHIL NFR BLD AUTO: 1 % (ref 0–3)
ERYTHROCYTE [DISTWIDTH] IN BLOOD BY AUTOMATED COUNT: 13.4 % (ref 11.6–14.8)
GLOBULIN SER-MCNC: 3.1 G/DL
HCT VFR BLD CALC: 26 % (ref 37–47)
HGB BLD-MCNC: 8.7 G/DL (ref 12–16)
LYMPHOCYTES NFR BLD AUTO: 15.6 % (ref 20–45)
MCV RBC AUTO: 90 FL (ref 80–99)
MONOCYTES NFR BLD AUTO: 4.7 % (ref 1–10)
NEUTROPHILS NFR BLD AUTO: 77.9 % (ref 45–75)
PLATELET # BLD: 182 K/UL (ref 150–450)
POTASSIUM SERPL-SCNC: 4.2 MMOL/L (ref 3.5–5.1)
RBC # BLD AUTO: 2.88 M/UL (ref 4.2–5.4)
SODIUM SERPL-SCNC: 142 MMOL/L (ref 136–145)
WBC # BLD AUTO: 7 K/UL (ref 4.8–10.8)

## 2020-08-21 PROCEDURE — 0DJD8ZZ INSPECTION OF LOWER INTESTINAL TRACT, VIA NATURAL OR ARTIFICIAL OPENING ENDOSCOPIC: ICD-10-PCS

## 2020-08-21 RX ADMIN — PANTOPRAZOLE SODIUM SCH MG: 40 INJECTION, POWDER, FOR SOLUTION INTRAVENOUS at 09:50

## 2020-08-21 RX ADMIN — SUCRALFATE SCH GM: 1 TABLET ORAL at 18:10

## 2020-08-21 RX ADMIN — AMPICILLIN SODIUM SCH MLS/HR: 2 INJECTION, POWDER, FOR SOLUTION INTRAVENOUS at 18:10

## 2020-08-21 RX ADMIN — DOCUSATE SODIUM SCH MG: 50 LIQUID ORAL at 18:10

## 2020-08-21 RX ADMIN — METOPROLOL TARTRATE SCH MG: 25 TABLET, FILM COATED ORAL at 00:04

## 2020-08-21 RX ADMIN — DOCUSATE SODIUM SCH MG: 50 LIQUID ORAL at 09:49

## 2020-08-21 RX ADMIN — METOPROLOL TARTRATE SCH MG: 25 TABLET, FILM COATED ORAL at 09:49

## 2020-08-21 RX ADMIN — AMPICILLIN SODIUM SCH MLS/HR: 2 INJECTION, POWDER, FOR SOLUTION INTRAVENOUS at 02:19

## 2020-08-21 RX ADMIN — SUCRALFATE SCH GM: 1 TABLET ORAL at 12:38

## 2020-08-21 RX ADMIN — SUCRALFATE SCH GM: 1 TABLET ORAL at 09:49

## 2020-08-21 RX ADMIN — MEROPENEM SCH MLS/HR: 500 INJECTION INTRAVENOUS at 09:46

## 2020-08-21 RX ADMIN — AMPICILLIN SODIUM SCH MLS/HR: 2 INJECTION, POWDER, FOR SOLUTION INTRAVENOUS at 09:51

## 2020-08-21 NOTE — PRE-PROCEDURE NOTE/ATTESTATION
Pre-Procedure Note/Attestation


Complete Prior to Procedure


Planned Procedure:  not applicable


Procedure Narrative:


colonoscopy





Indications for Procedure


Pre-Operative Diagnosis:


gib





Attestation


I attest that I discussed the nature of the procedure; its benefits; risks and 

complications; and alternatives (and the risks and benefits of such alternatives

), prior to the procedure, with the patient (or the patient's legal 

representative).





I attest that, if there was a reasonable possibility of needing a blood 

transfusion, the patient (or the patient's legal representative) was given the 

[facility] standardized written summary, pursuant 

to the [employer] Blood Safety Act ([place] Health and Safety Code # 1645, as 

amended).





I attest that I re-evaluated the patient just prior to the surgery and that 

there has been no change in the patient's H&P, except as documented below:











George Mir MD Aug 21, 2020 12:08

## 2020-08-21 NOTE — NUR
DISCHARGE DISPOSITION: PLEASE READ 



PATIENT TO BE DISCHARGED TO 

[address] [facility] 

[address] 

T: [phone]



[facility] W/ RT ETA 1800 



CM S/W JOSE DE JESUS MCDONOUGH [phone] WHO IS AGREEABLE TO DC TO SNF TODAY 



TRANSFER REPORT TO BE PROVIDED

## 2020-08-21 NOTE — NUR
NURSE NOTES:

Nulytely completed via PEG for colonoscopy at 1300. Approx 1,700 of yellow/green output. Pt 
HR stabilized at 98 bpm, /94. No cardiac or respiratory distress noted.

## 2020-08-21 NOTE — ENDOSCOPY PROCEDURE NOTE
Endoscopy Procedure Note


General


Indication for Procedure:  GIB


Procedures Performed:  colonoscopy


Operative Findings/Diagnosis:  DIVERTICULOSIS


Specimen:  none


Pt Tolerated Procedure Well:  Yes


Estimated Blood Loss:  none





Anesthesia


Anesthesiologist:  ARNULFO


Anesthesia:  MAC





Inserted Devices


Implant(s) used?:  No





GI Core Measures


50 yrs or older w/o bx or poly:  Not Applicable


10yrs. F/U recommended:  Not Applicable











George Mir MD Aug 21, 2020 14:18

## 2020-08-21 NOTE — SURGERY PROGRESS NOTE
Surgery Progress Note


Subjective


Additional Comments


no acute events


h/h stable


comfortable appearing





Objective





Last 24 Hour Vital Signs








  Date Time  Temp Pulse Resp B/P (MAP) Pulse Ox O2 Delivery O2 Flow Rate FiO2


 


8/21/20 10:57  109 21     35


 


8/21/20 09:49  120  140/95    


 


8/21/20 08:00 97.5 106 17 140/96 (111) 99   





  106      





  160      





  106      


 


8/21/20 07:54  121      


 


8/21/20 06:40  124 22     35


 


8/21/20 05:17  121 19     35


 


8/21/20 04:00        35


 


8/21/20 04:00      Mechanical Ventilator  


 


8/21/20 04:00 98.2 113 17 156/97 (116) 100   


 


8/21/20 03:36  118      


 


8/21/20 03:20  113 19     35


 


8/21/20 01:20  117 20     35


 


8/21/20 00:04  128  145/99    


 


8/21/20 00:00  125      


 


8/21/20 00:00        35


 


8/21/20 00:00      Mechanical Ventilator  


 


8/21/20 00:00 97.7 117 22 145/99 (114) 100   


 


8/20/20 22:33  130 19     35


 


8/20/20 21:57 98.7       


 


8/20/20 21:10  125 19     35


 


8/20/20 20:15  112 19     35


 


8/20/20 20:00 98.9 151 27 157/95 (115) 100   


 


8/20/20 20:00      Mechanical Ventilator  


 


8/20/20 19:26  120      


 


8/20/20 17:07  120 20     35


 


8/20/20 16:00        35


 


8/20/20 16:00 98.2 121 27 133/104 (114) 100   


 


8/20/20 16:00      Mechanical Ventilator  


 


8/20/20 16:00  106      


 


8/20/20 15:20  114 20     35


 


8/20/20 13:25  114 25     35


 


8/20/20 12:30  111 20 150/98 (115) 100   


 


8/20/20 12:00        35


 


8/20/20 12:00      Mechanical Ventilator  


 


8/20/20 12:00 98.2 109 22 149/102 (118) 100   


 


8/20/20 11:47  123      








I&O











Intake and Output  


 


 8/20/20 8/21/20





 19:00 07:00


 


Intake Total 215 ml 440 ml


 


Output Total 750 ml 600 ml


 


Balance -535 ml -160 ml


 


  


 


IV Total 215 ml 440 ml


 


Output Urine Total 750 ml 600 ml


 


# Bowel Movements 1 








Dressing:  saturated


Cardiovascular:  RSR


Respiratory:  decreased breath sounds


Abdomen:  soft, non-tender, present bowel sounds


Extremities:  edema, no tenderness, no cyanosis





Laboratory Tests








Test


  8/21/20


03:57


 


White Blood Count


  7.0 K/UL


(4.8-10.8)


 


Red Blood Count


  2.88 M/UL


(4.20-5.40)  L


 


Hemoglobin


  8.7 G/DL


(12.0-16.0)  L


 


Hematocrit


  26.0 %


(37.0-47.0)  L


 


Mean Corpuscular Volume 90 FL (80-99)  


 


Mean Corpuscular Hemoglobin


  30.2 PG


(27.0-31.0)


 


Mean Corpuscular Hemoglobin


Concent 33.4 G/DL


(32.0-36.0)


 


Red Cell Distribution Width


  13.4 %


(11.6-14.8)


 


Platelet Count


  182 K/UL


(150-450)


 


Mean Platelet Volume


  6.7 FL


(6.5-10.1)


 


Neutrophils (%) (Auto)


  77.9 %


(45.0-75.0)  H


 


Lymphocytes (%) (Auto)


  15.6 %


(20.0-45.0)  L


 


Monocytes (%) (Auto)


  4.7 %


(1.0-10.0)


 


Eosinophils (%) (Auto)


  1.0 %


(0.0-3.0)


 


Basophils (%) (Auto)


  0.8 %


(0.0-2.0)


 


Sodium Level


  142 MMOL/L


(136-145)


 


Potassium Level


  4.2 MMOL/L


(3.5-5.1)


 


Chloride Level


  108 MMOL/L


()  H


 


Carbon Dioxide Level


  24 MMOL/L


(21-32)


 


Anion Gap


  11 mmol/L


(5-15)


 


Blood Urea Nitrogen


  43 mg/dL


(7-18)  H


 


Creatinine


  1.1 MG/DL


(0.55-1.30)


 


Estimat Glomerular Filtration


Rate 47.0 mL/min


(>60)


 


Glucose Level


  72 MG/DL


()  L


 


Calcium Level


  8.1 MG/DL


(8.5-10.1)  L


 


Total Bilirubin


  0.8 MG/DL


(0.2-1.0)


 


Aspartate Amino Transf


(AST/SGOT) 38 U/L (15-37)


H


 


Alanine Aminotransferase


(ALT/SGPT) 40 U/L (12-78)


 


 


Alkaline Phosphatase


  113 U/L


()


 


Pro-B-Type Natriuretic Peptide


  73567 pg/mL


(0-125)  H


 


Total Protein


  5.1 G/DL


(6.4-8.2)  L


 


Albumin


  2.0 G/DL


(3.4-5.0)  L


 


Globulin 3.1 g/dL  


 


Albumin/Globulin Ratio


  0.6 (1.0-2.7)


L











Plan


Problems:  


(1) Sepsis


(2) Nosocomial pneumonia


(3) Hyponatremia


(4) UTI (urinary tract infection)


(5) Hypotension


(6) Chronic respiratory failure


Assessment & Plan:  87F ill appearing trach on vent


trach evaluated and 8f xlt noted


sutures in place causing tension and breakdown


sutures removed at bedside


trach stoma evaluated and with breakdown.  dressings applied


trach in place and functional


cont vent support


will monitor and follow with recs


thank you 





Pt presented on admission with Tracheostomy ,Generalized edema, Multiple 

Pressure injuries.


DTPI that is de-capped L Scapula(L)3.4cmx (W)7.3cm. Base of wound is 90% slough 

10% Loose purpuric base and edges. Surrounding non-blanching erythema.


Inferior L scapula ,but in close proximity is DTPI (L)1.9cm x (W)4.4cm. Base of 

wound is purpuric with Maroon borders. 


Within close proximity to both wounds #3 linear purpuric areas that fans from 

scapula area outward towards thoracic spine.


DTPI Sacrococcygeal to L Buttocks (L)7.4cm x (W)4.3cm.Base of wound is purpuric 

and indurated with surrounding non-blanching erythema.


Two Partial thickness pressure injuries noted to L buttocks(Proximal)2.5cm x (W)

4.6cm. Base of wound is moist and viable.Surrounding Non-Blanching erythema.(

Distal)1cm x (W)1cm. Base of wound is moist and viable.Surrounding Non-

Blanching erythema.


Perineal area is erythematous and moist . A purpuric area that is fluctuant 

noted at Perineum.


Bilat lower ext are edematous. Pt noted to have a large serous filled Bulla R 

Heel(L)11.5cm x (W)11.5cm.Surrounding area of heel is firm and pink. A second 

smaller serous Bulla noted to dorso/lateral R foot (L)2cm x(W)2.2cm.Scattered 

small purpuric areas noted to heads of 2nd ,3rd,4th metatarsals.


Multiple purpuric areas noted to distal/lateral L tibia and L foot.


Elongated Purpuric Area with Marginal erythema along edges noted to distal/

Lateral L tibia(L)7cm x (W)1.8cm.


L Heel /L Foot is an irregular shaped Purpuric area with marginal erythema 

without fluctuance/induration (L)3cm x (W)9.5cm. 


L Lateral Malleolus DTPI(L)1.5cm x (W)1.9cm.. Base of injury is fluctuant, 

purpuric with Maroon borders.


DTPI distal/lateral L foot (L)1.5cm x (W)1.5cm. Base of injury is maroon with 

fluctuance.


L Heel is boggy with non-blanchable erythema.





Tx.Plan: Cleanse wound L Scapula with Saline. Apply Therahoney. Apply Cavilon 

Skin Barrier Periwound. Cover with Optifoam 


            Drsg every 3 days and prn.


            Apply Cavilon Skin Barrier to Purpuric areas L Scapula. Cover with 

Optifoam drsg every 3 days and prn.


            Apply Moisture Barrier Paste to Sacrum and L Buttocks. Cover each 

site with Optifoam drsgs. Change every 3 days


            and PRN.


            Apply Cavilon Skin Barrier Spray to Blisters R foot. Cover with ABD 

Pads and wrap with Kerlix every 3 days and prn.


            Apply Cavilon Barrier Spray To Purpuric areas Distal L tibia and L 

foot. Cover with ABD Pads and wrap with Kerlix 


            every 3 days and prn.


            Reposition at least every 2hors or as tolerated .


            Off-load heels with Pillow.


            Place Pillow between knees.


            APM/HEATHER Mattress.








(7) Anemia


(8) NSTEMI (non-ST elevated myocardial infarction)


(9) Feeding by G-tube


Assessment & Plan:  DAILY ESTIMATED NEEDS:


Needs based on wound, critical care 47.5kg abw 


25-30  kcals/kg 


3915-8823  total kcals


1.25-2  g protein/kg


59-95  g total protein 


25-30ml/kcal   mL/kg


6299-8331  total fluid mLs





NUTRITION DIAGNOSIS:


Swallowing difficulty r/t respiratory status as evidenced by pt is trach


and peg dep.





CURRENT TF: NPO for EGD  





ENTERAL NUTRITION RECOMMENDATIONS:


Osmolite 1.2 goal of 45ml/hr x24 hrs + Prosource 1 pack daily    to provide 

1080ml, 1296 kcal, 60g + 11g pro, 886ml free H2O  





- As medically able, start Osmolite low fiber TF @low rate 25ml/hr for 6 hrs.


- Advance as tolerated 10ml/hr q4-6 hrs to goal


- Flush per MD/ HOB over 30 degrees


- Add Prosource 1 pack daily to better meet est pro needs.








ADDITIONAL RECOMMENDATIONS:


1) F/up w/ WC eval, add KEVIN BID w/ TF order 


2) Per SNF : 4'8" (56 inches) and 142 lbs/64.55kg 


3) Monitor renal function, lytes; need for renal TF formula  


4) Monitor BG and need for carb control formula 


   -> rec accuchecks + niss  





(10) XUAN (acute kidney injury)


(11) Chronic vegetative state


(12) ICH (intracerebral hemorrhage)











Reymundo Driscoll Aug 21, 2020 11:34

## 2020-08-21 NOTE — NUR
NURSE NOTES:

Received patient from ROSALBA Wetzel. Patient is alert to shaking, non-verbal, on cardiac 
monitor, and no acute distress. Patient is clean and in bed. Trach Shiely 8, AC 10, TV 10, 
, PEEP of 5. Patient is NPO pending procedure and has a G-tube.  Santizo draining to 
gravity. Wounds noted with pictures. IV sites left habf 24g, right hand 22g patent with no 
signs of infection. Bed at its lowest position, call light in reach, and x3 bed rails are 
up. Will continue to monitor.

## 2020-08-21 NOTE — 48 HOUR POST ANESTHESIA EVAL
Post Anesthesia Evaluation


Procedure:  colonscopy


Date of Evaluation:  Aug 21, 2020


Time of Evaluation:  15:35


Blood Pressure Systolic:  145


0:  60


Pulse Rate:  100


Respiratory Rate:  20


O2 Sat by Pulse Oximetry:  98


Airway:  patent, other - mech vent - see chart


Nausea:  No


Vomiting:  No


Hydration Status:  adequate


Cardiopulmonary Status:


stable


Mental Status/LOC:  patient returned to baseline


Follow-up Care/Observations:


na


Post-Anesthesia Complications:


none


Follow-up care needed:  N/A











Renée Bills CRNA Aug 21, 2020 15:35

## 2020-08-21 NOTE — NUR
NURSE NOTES:

Dr Redding at bed side, BP from pervious day as well as today reported. No now orders at this 
tme.

## 2020-08-21 NOTE — NUR
***INSURANCE*** 



PROGRESS NOTES FOR 8/21 FAXED TO 



[facility]

FAX CLINICALS TO [facility]

P:

F:[phone]



COVER LETTER INDICATES PT IS BEING DC TODAY

## 2020-08-21 NOTE — INFECTIOUS DISEASES PROG NOTE
Assessment/Plan





86yo F with:





Hypotension in setting of anemia to 6.0


Normal WBC


Afebrile, Tmax 99.7 --> Febrile to 100.4


Fungemia


   8/16 BCx +C.parapsilosis


   8/16 TTE wo apparent vegetations


   8/18 R fem CVC removed, tip cx + GNRs + yeast


   8/19 BCx NTD


   8/20 BCx NTD


GPC Bacteremia


   8/16 BCx 1/2 +E.faecalis (S-amp)


   MRSA nares neg


Possible UTI


   8/16 UA+, UCx >100k ESBL Kleb pna (R-levo @1)


Possible pna, vent dependent via trach


   8/16 CXR: Patchy opacities throughout the lungs which may represent 

pulmonary edema versus infectious/inflammatory process. Possible small pleural 

effusions.


   8/16 COVID rapid Ag neg x1


   8/17 COVID rapid Ag neg x1


Volume OL? BNP 16,470


NSTEMI, troponin 2.1


XUAN on CKD, Cr 1.8, improving





R/o C.dif neg 8/17





Vent dependent, FiO2 35%


S/p trach 5 days pta


H/o GIB


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





PMH: GIB, ICH, trach and vent dependent, HTN, GERD





Plan:


Stop meropenem, s/p 5d for ESBL Kleb pna UTI


Cont ampicillin #3/11 (abx day #6/14) for E.faecalis bacteremia


Cont micafungin #4/14 for Candidemia (possibly from fem CVC, removed 8/18, 

needs 2 wk course from then, so through 8/31 8/21 SP mahi #5 for ESBL UTI


   8/19 SP vanco #1


   8/18 SP Zosyn #2





?Source of polymicrobial bacteremia/fungemia, likely gut source given organisms


F/u colonoscopy results (to be done today), if this is unremarkable then will 

consider CT A/P w con to eval for source of bacteremia/fungemia





F/u BCx


Trend XUAN, improving





Monitor CBC/CMP


Monitor resp status


Monitor temp curve





D/w RN





Thank you for this consult. [facility] will continue to follow.





Subjective


Allergies:  


Coded Allergies:  


     No Known Allergies (Unverified , 8/16/20)





AF


WBC 7


Remains on vent, FiO2 35% PEEP 5 satting 100%


NAD


Going for colonoscopy today





Objective





Last 24 Hour Vital Signs








  Date Time  Temp Pulse Resp B/P (MAP) Pulse Ox O2 Delivery O2 Flow Rate FiO2


 


8/21/20 06:40  124 22     35


 


8/21/20 05:17  121 19     35


 


8/21/20 04:00        35


 


8/21/20 04:00      Mechanical Ventilator  


 


8/21/20 04:00 98.2 113 17 156/97 (116) 100   


 


8/21/20 03:36  118      


 


8/21/20 03:20  113 19     35


 


8/21/20 01:20  117 20     35


 


8/21/20 00:04  128  145/99    


 


8/21/20 00:00  125      


 


8/21/20 00:00        35


 


8/21/20 00:00      Mechanical Ventilator  


 


8/21/20 00:00 97.7 117 22 145/99 (114) 100   


 


8/20/20 22:33  130 19     35


 


8/20/20 21:57 98.7       


 


8/20/20 21:10  125 19     35


 


8/20/20 20:15  112 19     35


 


8/20/20 20:00 98.9 151 27 157/95 (115) 100   


 


8/20/20 20:00      Mechanical Ventilator  


 


8/20/20 19:26  120      


 


8/20/20 17:07  120 20     35


 


8/20/20 16:00        35


 


8/20/20 16:00 98.2 121 27 133/104 (114) 100   


 


8/20/20 16:00      Mechanical Ventilator  


 


8/20/20 16:00  106      


 


8/20/20 15:20  114 20     35


 


8/20/20 13:25  114 25     35


 


8/20/20 12:30  111 20 150/98 (115) 100   


 


8/20/20 12:00        35


 


8/20/20 12:00      Mechanical Ventilator  


 


8/20/20 12:00 98.2 109 22 149/102 (118) 100   


 


8/20/20 11:47  123      


 


8/20/20 09:10  132 21     35


 


8/20/20 08:00 97.9 134 24 139/94 (109) 100   


 


8/20/20 08:00      Mechanical Ventilator  


 


8/20/20 08:00        35


 


8/20/20 07:56  124      








Height (Feet):  5


Height (Inches):  1.00


Weight (Pounds):  161





Gen: Older woman, on vent, NAD


HEENT: Trach


CV: RRR


Pulm: CTAB on vent


Abd: Soft, NTND, +PEG


Neuro: Non-responsive


Lines: PIVs in BL hands





Microbiology








 Date/Time


Source Procedure


Growth Status


 


 


 8/20/20 03:20


Blood Blood Culture - Preliminary


NO GROWTH AFTER 24 HOURS Resulted


 


 8/19/20 09:30


Blood Blood Culture - Preliminary


NO GROWTH AFTER 24 HOURS Resulted











Laboratory Tests








Test


  8/21/20


03:57


 


White Blood Count


  7.0 K/UL


(4.8-10.8)


 


Red Blood Count


  2.88 M/UL


(4.20-5.40)  L


 


Hemoglobin


  8.7 G/DL


(12.0-16.0)  L


 


Hematocrit


  26.0 %


(37.0-47.0)  L


 


Mean Corpuscular Volume 90 FL (80-99)  


 


Mean Corpuscular Hemoglobin


  30.2 PG


(27.0-31.0)


 


Mean Corpuscular Hemoglobin


Concent 33.4 G/DL


(32.0-36.0)


 


Red Cell Distribution Width


  13.4 %


(11.6-14.8)


 


Platelet Count


  182 K/UL


(150-450)


 


Mean Platelet Volume


  6.7 FL


(6.5-10.1)


 


Neutrophils (%) (Auto)


  77.9 %


(45.0-75.0)  H


 


Lymphocytes (%) (Auto)


  15.6 %


(20.0-45.0)  L


 


Monocytes (%) (Auto)


  4.7 %


(1.0-10.0)


 


Eosinophils (%) (Auto)


  1.0 %


(0.0-3.0)


 


Basophils (%) (Auto)


  0.8 %


(0.0-2.0)


 


Sodium Level


  142 MMOL/L


(136-145)


 


Potassium Level


  4.2 MMOL/L


(3.5-5.1)


 


Chloride Level


  108 MMOL/L


()  H


 


Carbon Dioxide Level


  24 MMOL/L


(21-32)


 


Anion Gap


  11 mmol/L


(5-15)


 


Blood Urea Nitrogen


  43 mg/dL


(7-18)  H


 


Creatinine


  1.1 MG/DL


(0.55-1.30)


 


Estimat Glomerular Filtration


Rate 47.0 mL/min


(>60)


 


Glucose Level


  72 MG/DL


()  L


 


Calcium Level


  8.1 MG/DL


(8.5-10.1)  L


 


Total Bilirubin


  0.8 MG/DL


(0.2-1.0)


 


Aspartate Amino Transf


(AST/SGOT) 38 U/L (15-37)


H


 


Alanine Aminotransferase


(ALT/SGPT) 40 U/L (12-78)


 


 


Alkaline Phosphatase


  113 U/L


()


 


Pro-B-Type Natriuretic Peptide


  94834 pg/mL


(0-125)  H


 


Total Protein


  5.1 G/DL


(6.4-8.2)  L


 


Albumin


  2.0 G/DL


(3.4-5.0)  L


 


Globulin 3.1 g/dL  


 


Albumin/Globulin Ratio


  0.6 (1.0-2.7)


L











Current Medications








 Medications


  (Trade)  Dose


 Ordered  Sig/Lisa


 Route


 PRN Reason  Start Time


 Stop Time Status Last Admin


Dose Admin


 


 Acetaminophen


  (Tylenol)  650 mg  Q6H  PRN


 GT


 For Headache  8/17/20 20:30


 9/16/20 20:29  8/20/20 21:27


 


 


 Ampicillin 2 gm/


 Sodium Chloride  110 ml @ 


 220 mls/hr  Q8H


 IVPB


   8/19/20 10:00


 8/26/20 09:59  8/21/20 02:19


 


 


 Docusate Sodium


  (Colace)  100 mg  TWICE A  DAY


 NG


   8/17/20 18:00


 9/16/20 17:59  8/20/20 18:37


 


 


 Furosemide


  (Lasix)  20 mg  DAILY


 IV


   8/20/20 09:00


 9/19/20 08:59  8/20/20 09:33


 


 


 Meropenem 500 mg/


 Sodium Chloride  55 ml @ 


 110 mls/hr  EVERY 12  HOURS


 IVPB


   8/18/20 13:00


 8/23/20 12:59  8/20/20 21:28


 


 


 Metoprolol


 Tartrate


  (Lopressor)  12.5 mg  Q12HR


 ORAL


   8/20/20 22:15


 11/18/20 22:14  8/21/20 00:04


 


 


 Micafungin Sodium


 100 mg/Sodium


 Chloride  110 ml @ 


 110 mls/hr  Q24H


 IVPB


   8/18/20 20:00


 8/25/20 19:59  8/20/20 21:28


 


 


 Pantoprazole


  (Protonix)  40 mg  EVERY 12  HOURS


 IVP


   8/16/20 21:00


 9/15/20 20:59  8/20/20 21:26


 


 


 Potassium Chloride


  (K-Dur)  40 meq  TWICE A  DAY


 GT


   8/19/20 18:00


 11/17/20 17:59  8/20/20 18:37


 


 


 Sucralfate


  (Carafate)  1 gm  FOUR TIMES A  DAY


 GT


   8/16/20 18:00


 11/14/20 17:59  8/20/20 21:26


 

















Vianney Feldman M.D. Aug 21, 2020 07:33

## 2020-08-21 NOTE — DIAGNOSTIC IMAGING REPORT
Procedure: XRAY Chest 1v

Reason for study: Reason For Exam: [id number]

 

Comparison films:  8/18/2020.

 

FINDINGS:

 

Tracheostomy remains in place. Extensive bilateral alveolar airspace disease

unchanged. Cardiomegaly and effusions unchanged. The bony thorax appear unremarkable.

 

IMPRESSION:  

 

NO SIGNIFICANT CHANGE COMPARED TO PREVIOUS EXAM.

## 2020-08-21 NOTE — ANETHESIA PREOPERATIVE EVAL
Anesthesia Pre-op PMH/ROS


General


Date of Evaluation:  Aug 21, 2020


Time of Evaluation:  14:00


Anesthesiologist:  leslee


ASA Score:  ASA 4


Mallampati Score


Class I : Soft palate, uvula, fauces, pillars visible


Class II: Soft palate, uvula, fauces visible


Class III: Soft palate, base of uvula visible


Class IV: Only hard plate visible


Mallampati Classification:  Class III


Surgeon:  ghulam


Diagnosis:  anemia


Surgical Procedure:  colonoscopy


Anesthesia History:  none


Family History:  no anesthesia problems


Allergies:  


Coded Allergies:  


     No Known Allergies (Unverified , 8/16/20)


Medications:  see eMAR


Patient NPO?:  Yes


NPO Date:  Aug 19, 2020


NPO Time:  00:01





Past Medical History


Cardiovascular:  Reports: HTN, CAD, MI, arrhythmia


Pulmonary:  Reports: other - Resp Failure, trach, mech ventilated; 


   Denies: asthma, COPD, [place]


Gastrointestinal/Genitourinary:  Reports: GERD, CRI; 


   Denies: ESRD, other


Neurologic/Psychiatric:  Reports: dementia; 


   Denies: CVA, depression/anxiety, TIA, other


Endocrine:  Denies: DM, hypothyroidism, steroids, other


HEENT:  Denies: cataract (L), cataract (R), glaucoma, [place] (L), [place] (R), other


Hematology/Immune:  Reports: anemia; 


   Denies: DVT, bleeding disorder, other


Musculoskeletal/Integumentary:  Denies: OA, RA, DJD, DDD, edema, other


Other:  obesity


PMH Narrative:





# Anemia rule out underlying gi bleed





# NSTEMI (non-ST elevated myocardial infarction) with trop elevation


-


# XUAN (acute kidney injury)


--> cr 1.8-->1.5


# Hyponatremia/Hypokalemoa





# Respiratory failure s/p trach





# PNA on cxr





# Dysphagia s/p peg





Anesthesia Pre-op Phys. Exam


Physician Exam





Last Vital Signs








  Date Time  Temp Pulse Resp B/P (MAP) Pulse Ox O2 Delivery O2 Flow Rate FiO2


 


8/21/20 11:29  112      


 


8/21/20 10:57   21     35


 


8/21/20 09:49    140/95    


 


8/21/20 08:00 97.5    99   


 


8/21/20 04:00      Mechanical Ventilator  








Constitutional:  other - weakness, dementia, anemia,


Neurologic:  other - demented


Cardiovascular:  other - afib


Respiratory:  CTA


Gastrointestinal:  S/NT/ND





Airway Exam


Mallampati Classification


3


Mallampati Score:  Class III


MO:  limited


ROM:  limited





Anesthesia Pre-op A/P


Labs





Hematology








Test


  8/21/20


03:57


 


White Blood Count


  7.0 K/UL


(4.8-10.8)


 


Red Blood Count


  2.88 M/UL


(4.20-5.40)  L


 


Hemoglobin


  8.7 G/DL


(12.0-16.0)  L


 


Hematocrit


  26.0 %


(37.0-47.0)  L


 


Mean Corpuscular Volume 90 FL (80-99)  


 


Mean Corpuscular Hemoglobin


  30.2 PG


(27.0-31.0)


 


Mean Corpuscular Hemoglobin


Concent 33.4 G/DL


(32.0-36.0)


 


Red Cell Distribution Width


  13.4 %


(11.6-14.8)


 


Platelet Count


  182 K/UL


(150-450)


 


Mean Platelet Volume


  6.7 FL


(6.5-10.1)


 


Neutrophils (%) (Auto)


  77.9 %


(45.0-75.0)  H


 


Lymphocytes (%) (Auto)


  15.6 %


(20.0-45.0)  L


 


Monocytes (%) (Auto)


  4.7 %


(1.0-10.0)


 


Eosinophils (%) (Auto)


  1.0 %


(0.0-3.0)


 


Basophils (%) (Auto)


  0.8 %


(0.0-2.0)








Chemistry








Test


  8/21/20


03:57


 


Sodium Level


  142 MMOL/L


(136-145)


 


Potassium Level


  4.2 MMOL/L


(3.5-5.1)


 


Chloride Level


  108 MMOL/L


()  H


 


Carbon Dioxide Level


  24 MMOL/L


(21-32)


 


Anion Gap


  11 mmol/L


(5-15)


 


Blood Urea Nitrogen


  43 mg/dL


(7-18)  H


 


Creatinine


  1.1 MG/DL


(0.55-1.30)


 


Estimat Glomerular Filtration


Rate 47.0 mL/min


(>60)


 


Glucose Level


  72 MG/DL


()  L


 


Calcium Level


  8.1 MG/DL


(8.5-10.1)  L


 


Total Bilirubin


  0.8 MG/DL


(0.2-1.0)


 


Aspartate Amino Transf


(AST/SGOT) 38 U/L (15-37)


H


 


Alanine Aminotransferase


(ALT/SGPT) 40 U/L (12-78)


 


 


Alkaline Phosphatase


  113 U/L


()


 


Pro-B-Type Natriuretic Peptide


  04572 pg/mL


(0-125)  H


 


Total Protein


  5.1 G/DL


(6.4-8.2)  L


 


Albumin


  2.0 G/DL


(3.4-5.0)  L


 


Globulin 3.1 g/dL  


 


Albumin/Globulin Ratio


  0.6 (1.0-2.7)


L











Studies


Pre-op Studies:  EKG - afib hr 100





Risk Assessment & Plan


Assessment:


covid neg


Plan:


mac


Status Change Before Surgery:  No





Pre-Antibiotics


Drug:  none











Renée Bills CRNA Aug 21, 2020 14:28

## 2020-08-21 NOTE — CARDIAC ELECTROPHYSIOLOGY PN
Assessment/Plan


Assessment/Plan


1. Non-ST elevation myocardial infarction with troponin of 3.2, down to 2 and 

1.9. 


Due to demand ischemia in view hemoglobin of only 6 as


well as renal failure. Her EKG shows sinus rhythm with nonspecific ST-T


wave abnormality and no ST elevation. Added Lopressor 12.5 bid





2. Atrial fib with tachy katt syndrome. 


 HR 30s and up to 150s. Had to resume Lopressor 12.5 bid yesterday in view of 

tachy to 150s.





3. Volume overload.  BNP of 19,000 as well as 4+ edema.  


Likely intravascularly depleted as her BUN is 112 and


creatinine 1.8.  Improved to 62/1.2. Fu  by Dr. Garcia.





4. Severe hyponatremia, Sodium 125.





5. 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


6. Dysphagia, status post PEG placement.


7. Acute renal failure.


8.  Severe Anemia. Got PRBC and is on IV antibiotic per ID. S/P EGD. 


Colonscopy rescheduled for today


9.  S/P Septic shock with lactic acidosis and Hb 6. 





DW RN and Dr Casillas





Subjective


Subjective


Remained in atrial fib but had RVR episode up to 150s and started on Lopressor 

12.5 bid.  On 35% Fio2 on Vent via Trach.


    EGD was negative. Colonoscopy rescheduled for 1 pm today. S/P Abd CT 

yesterday





Objective





Last 24 Hour Vital Signs








  Date Time  Temp Pulse Resp B/P (MAP) Pulse Ox O2 Delivery O2 Flow Rate FiO2


 


8/21/20 10:57  109 21     35


 


8/21/20 09:49  120  140/95    


 


8/21/20 08:00 97.5 106 17 140/96 (111) 99   





  106      





  160      





  106      


 


8/21/20 07:54  121      


 


8/21/20 06:40  124 22     35


 


8/21/20 05:17  121 19     35


 


8/21/20 04:00        35


 


8/21/20 04:00      Mechanical Ventilator  


 


8/21/20 04:00 98.2 113 17 156/97 (116) 100   


 


8/21/20 03:36  118      


 


8/21/20 03:20  113 19     35


 


8/21/20 01:20  117 20     35


 


8/21/20 00:04  128  145/99    


 


8/21/20 00:00  125      


 


8/21/20 00:00        35


 


8/21/20 00:00      Mechanical Ventilator  


 


8/21/20 00:00 97.7 117 22 145/99 (114) 100   


 


8/20/20 22:33  130 19     35


 


8/20/20 21:57 98.7       


 


8/20/20 21:10  125 19     35


 


8/20/20 20:15  112 19     35


 


8/20/20 20:00 98.9 151 27 157/95 (115) 100   


 


8/20/20 20:00      Mechanical Ventilator  


 


8/20/20 19:26  120      


 


8/20/20 17:07  120 20     35


 


8/20/20 16:00        35


 


8/20/20 16:00 98.2 121 27 133/104 (114) 100   


 


8/20/20 16:00      Mechanical Ventilator  


 


8/20/20 16:00  106      


 


8/20/20 15:20  114 20     35


 


8/20/20 13:25  114 25     35


 


8/20/20 12:30  111 20 150/98 (115) 100   


 


8/20/20 12:00        35


 


8/20/20 12:00      Mechanical Ventilator  


 


8/20/20 12:00 98.2 109 22 149/102 (118) 100   

















Intake and Output  


 


 8/20/20 8/21/20





 19:00 07:00


 


Intake Total 215 ml 440 ml


 


Output Total 750 ml 600 ml


 


Balance -535 ml -160 ml


 


  


 


IV Total 215 ml 440 ml


 


Output Urine Total 750 ml 600 ml


 


# Bowel Movements 1 











Laboratory Tests








Test


  8/21/20


03:57


 


White Blood Count


  7.0 K/UL


(4.8-10.8)


 


Red Blood Count


  2.88 M/UL


(4.20-5.40)  L


 


Hemoglobin


  8.7 G/DL


(12.0-16.0)  L


 


Hematocrit


  26.0 %


(37.0-47.0)  L


 


Mean Corpuscular Volume 90 FL (80-99)  


 


Mean Corpuscular Hemoglobin


  30.2 PG


(27.0-31.0)


 


Mean Corpuscular Hemoglobin


Concent 33.4 G/DL


(32.0-36.0)


 


Red Cell Distribution Width


  13.4 %


(11.6-14.8)


 


Platelet Count


  182 K/UL


(150-450)


 


Mean Platelet Volume


  6.7 FL


(6.5-10.1)


 


Neutrophils (%) (Auto)


  77.9 %


(45.0-75.0)  H


 


Lymphocytes (%) (Auto)


  15.6 %


(20.0-45.0)  L


 


Monocytes (%) (Auto)


  4.7 %


(1.0-10.0)


 


Eosinophils (%) (Auto)


  1.0 %


(0.0-3.0)


 


Basophils (%) (Auto)


  0.8 %


(0.0-2.0)


 


Sodium Level


  142 MMOL/L


(136-145)


 


Potassium Level


  4.2 MMOL/L


(3.5-5.1)


 


Chloride Level


  108 MMOL/L


()  H


 


Carbon Dioxide Level


  24 MMOL/L


(21-32)


 


Anion Gap


  11 mmol/L


(5-15)


 


Blood Urea Nitrogen


  43 mg/dL


(7-18)  H


 


Creatinine


  1.1 MG/DL


(0.55-1.30)


 


Estimat Glomerular Filtration


Rate 47.0 mL/min


(>60)


 


Glucose Level


  72 MG/DL


()  L


 


Calcium Level


  8.1 MG/DL


(8.5-10.1)  L


 


Total Bilirubin


  0.8 MG/DL


(0.2-1.0)


 


Aspartate Amino Transf


(AST/SGOT) 38 U/L (15-37)


H


 


Alanine Aminotransferase


(ALT/SGPT) 40 U/L (12-78)


 


 


Alkaline Phosphatase


  113 U/L


()


 


Pro-B-Type Natriuretic Peptide


  41106 pg/mL


(0-125)  H


 


Total Protein


  5.1 G/DL


(6.4-8.2)  L


 


Albumin


  2.0 G/DL


(3.4-5.0)  L


 


Globulin 3.1 g/dL  


 


Albumin/Globulin Ratio


  0.6 (1.0-2.7)


L











Microbiology








 Date/Time


Source Procedure


Growth Status


 


 


 8/20/20 03:20


Blood Blood Culture - Preliminary


NO GROWTH AFTER 24 HOURS Resulted


 


 8/19/20 09:30


Blood Blood Culture - Preliminary


NO GROWTH AFTER 24 HOURS Resulted








Objective


HEAD AND NECK:  Status post tracheostomy, which is fresh.


LUNGS:  Coarse rhonchi.


CARDIOVASCULAR:  Regular S1 and S2 with no gallop.


ABDOMEN:  Status post G-tube.


EXTREMITIES:   3+ pitting edema.











Antione Redding MD Aug 21, 2020 11:53

## 2020-08-21 NOTE — IMMEDIATE POST-OP EVALUATION
Immediate Post-Op Evalulation


Immediate Post-Op Evalulation


Procedure:  EGD


Date of Evaluation:  Aug 21, 2020


Time of Evaluation:  14:29


IV Fluids:  200


Blood Pressure Systolic:  125


Blood Pressure Diastolic:  70


Pulse Rate:  100


Respiratory Rate:  10


O2 Sat by Pulse Oximetry:  98


Nausea:  No


Vomiting:  No


Complications


none


Patient Status:  reacts, patent, ventilated - back to oringal settings; AC 35% 

10 400 Peep 5


Hydration Status:  adequate


Drug:  none











Renée Bills CRNA Aug 21, 2020 14:29

## 2020-08-21 NOTE — HEMATOLOGY/ONC PROGRESS NOTE
Assessment/Plan


Assessment/Plan





Assessment and Recs


# Anemia rule out underlying gi bleed


--> anemia panel has been ordered, esr, ferritin, tibc, occult


--> s/p transfusion prbc 8/16


--> gi service if h/h downtrends


--> triple lumen has been placed


--> hgb 7.7-->8.1->8-->8.7


# NSTEMI (non-ST elevated myocardial infarction) with trop elevation


--> on bb, off asa


--> seen by Dr. Redding


--> volume management as per cards/renal


--> trop downtrended


# XUAN (acute kidney injury)


--> cr 1.8-->1.5-->1


# Hyponatremia/Hypokalemoa


--> per renal recs


# Respiratory failure s/p trach


--> consulted pulm


# PNA on cxr


--> as per id recs


--> ABX amp/micaf/mahi


# Dysphagia s/p peg


# Dvt ppx scds





Appreciate consultant care, will follow





Subjective


HEENT:  Denies: no symptoms, eye pain, blurred vision, tearing, double vision, 

ear pain, ear discharge, nose pain, nose congestion, throat pain, throat 

swelling, mouth pain, mouth swelling, other


Cardiovascular:  Denies: no symptoms, chest pain, edema, irregular heart rate, 

lightheadedness, palpitations, syncope, other


Respiratory:  Denies: no symptoms, cough, shortness of breath, SOB with 

excertion, SOB at rest, sputum, wheezing, other


Genitourinary:  Denies: no symptoms, burning, discharge, frequency, flank pain, 

hematuria, incontinence, pain, urgency, other


Neurologic/Psychiatric:  Denies: no symptoms, anxiety, depressed, emotional 

problems, headache, numbness, paresthesia, pre-existing deficit, seizure, 

tingling, tremors, weakness, other


Endocrine:  Denies: no symptoms, excessive sweating, flushing, intolerance to 

cold, intolerance to heat, increased hunger, increased thirst, increased urine, 

unexplained weight gain, unexplained weight loss, other


Hematologic/Lymphatic:  Denies: no symptoms, anemia, easy bleeding, easy 

bruising, adenopathy, other


Allergies:  


Coded Allergies:  


     No Known Allergies (Unverified , 8/16/20)


Subjective


8/18 remains obtunded, dw rn, labs are noted, on vanc/zosyn, mild temp overnight


8/19 no bleeding or chills, on abx, remains confused, no night swaets


8/20 labs are noted, no bleeding, meds noted, cbc reviewed, no heoptysis


8/21 hgb 8.7, no hemolysis, no bleeding, dw rn, without change





Objective


Objective





Current Medications








 Medications


  (Trade)  Dose


 Ordered  Sig/Lisa


 Route


 PRN Reason  Start Time


 Stop Time Status Last Admin


Dose Admin


 


 Acetaminophen


  (Tylenol)  650 mg  Q6H  PRN


 GT


 For Headache  8/17/20 20:30


 9/16/20 20:29  8/20/20 21:27


 


 


 Ampicillin 2 gm/


 Sodium Chloride  110 ml @ 


 220 mls/hr  Q8H


 IVPB


   8/19/20 10:00


 8/26/20 09:59  8/21/20 02:19


 


 


 Docusate Sodium


  (Colace)  100 mg  TWICE A  DAY


 NG


   8/17/20 18:00


 9/16/20 17:59  8/20/20 18:37


 


 


 Furosemide


  (Lasix)  20 mg  DAILY


 IV


   8/20/20 09:00


 9/19/20 08:59  8/20/20 09:33


 


 


 Meropenem 500 mg/


 Sodium Chloride  55 ml @ 


 110 mls/hr  EVERY 12  HOURS


 IVPB


   8/18/20 13:00


 8/23/20 12:59  8/20/20 21:28


 


 


 Metoprolol


 Tartrate


  (Lopressor)  12.5 mg  Q12HR


 ORAL


   8/20/20 22:15


 11/18/20 22:14  8/21/20 00:04


 


 


 Micafungin Sodium


 100 mg/Sodium


 Chloride  110 ml @ 


 110 mls/hr  Q24H


 IVPB


   8/18/20 20:00


 8/25/20 19:59  8/20/20 21:28


 


 


 Pantoprazole


  (Protonix)  40 mg  EVERY 12  HOURS


 IVP


   8/16/20 21:00


 9/15/20 20:59  8/20/20 21:26


 


 


 Potassium Chloride


  (K-Dur)  40 meq  TWICE A  DAY


 GT


   8/19/20 18:00


 11/17/20 17:59  8/20/20 18:37


 


 


 Sucralfate


  (Carafate)  1 gm  FOUR TIMES A  DAY


 GT


   8/16/20 18:00


 11/14/20 17:59  8/20/20 21:26


 











Last 24 Hour Vital Signs








  Date Time  Temp Pulse Resp B/P (MAP) Pulse Ox O2 Delivery O2 Flow Rate FiO2


 


8/21/20 06:40  124 22     35


 


8/21/20 05:17  121 19     35


 


8/21/20 04:00        35


 


8/21/20 04:00      Mechanical Ventilator  


 


8/21/20 04:00 98.2 113 17 156/97 (116) 100   


 


8/21/20 03:36  118      


 


8/21/20 03:20  113 19     35


 


8/21/20 01:20  117 20     35


 


8/21/20 00:04  128  145/99    


 


8/21/20 00:00  125      


 


8/21/20 00:00        35


 


8/21/20 00:00      Mechanical Ventilator  


 


8/21/20 00:00 97.7 117 22 145/99 (114) 100   


 


8/20/20 22:33  130 19     35


 


8/20/20 21:57 98.7       


 


8/20/20 21:10  125 19     35


 


8/20/20 20:15  112 19     35


 


8/20/20 20:00 98.9 151 27 157/95 (115) 100   


 


8/20/20 20:00      Mechanical Ventilator  


 


8/20/20 19:26  120      


 


8/20/20 17:07  120 20     35


 


8/20/20 16:00        35


 


8/20/20 16:00 98.2 121 27 133/104 (114) 100   


 


8/20/20 16:00      Mechanical Ventilator  


 


8/20/20 16:00  106      


 


8/20/20 15:20  114 20     35


 


8/20/20 13:25  114 25     35


 


8/20/20 12:30  111 20 150/98 (115) 100   


 


8/20/20 12:00        35


 


8/20/20 12:00      Mechanical Ventilator  


 


8/20/20 12:00 98.2 109 22 149/102 (118) 100   


 


8/20/20 11:47  123      


 


8/20/20 09:10  132 21     35


 


8/20/20 08:00 97.9 134 24 139/94 (109) 100   


 


8/20/20 08:00      Mechanical Ventilator  


 


8/20/20 08:00        35


 


8/20/20 07:56  124      


 


8/20/20 07:30  142 22     35


 


8/20/20 05:20  122 21     35


 


8/20/20 04:00        35


 


8/20/20 04:00  99      


 


8/20/20 04:00      Mechanical Ventilator  


 


8/20/20 04:00 97.9 114 18 116/82 (93) 100   


 


8/20/20 03:04  128 28     35


 


8/20/20 00:32  78 15     35


 


8/20/20 00:00      Mechanical Ventilator  


 


8/20/20 00:00  80      


 


8/20/20 00:00 98.1 100 18 141/81 (101) 100   


 


8/19/20 23:11  111 21     35


 


8/19/20 21:08  109 21     35


 


8/19/20 20:00  102      


 


8/19/20 20:00        35


 


8/19/20 20:00      Mechanical Ventilator  


 


8/19/20 20:00 100.4 110 20 136/89 (105) 100   


 


8/19/20 18:33  115 26     35


 


8/19/20 17:05  124 23     35


 


8/19/20 16:00 98.2 95 21 120/96 (104) 100   


 


8/19/20 16:00      Mechanical Ventilator  


 


8/19/20 16:00  95      


 


8/19/20 16:00        35


 


8/19/20 15:15  70 19     35


 


8/19/20 13:05  122 26     35


 


8/19/20 12:00        35


 


8/19/20 12:00  79      


 


8/19/20 12:00      Mechanical Ventilator  


 


8/19/20 12:00 97.5 91 17 134/83 (100) 100   


 


8/19/20 10:44  84 16     35


 


8/19/20 10:11  98 14  98   


 


8/19/20 08:54  86 21     35


 


8/19/20 08:49  100 10  99   

















Intake and Output  


 


 8/20/20 8/21/20





 19:00 07:00


 


Intake Total 215 ml 440 ml


 


Output Total 750 ml 600 ml


 


Balance -535 ml -160 ml


 


  


 


IV Total 215 ml 440 ml


 


Output Urine Total 750 ml 600 ml


 


# Bowel Movements 1 











Labs








Test


  8/19/20


02:50 8/19/20


09:30 8/20/20


03:20 8/21/20


03:57


 


White Blood Count


  7.2 K/UL


(4.8-10.8) 


  5.7 K/UL


(4.8-10.8) 7.0 K/UL


(4.8-10.8)


 


Red Blood Count


  2.74 M/UL


(4.20-5.40) 


  2.72 M/UL


(4.20-5.40) 2.88 M/UL


(4.20-5.40)


 


Hemoglobin


  8.1 G/DL


(12.0-16.0) 


  8.0 G/DL


(12.0-16.0) 8.7 G/DL


(12.0-16.0)


 


Hematocrit


  24.6 %


(37.0-47.0) 


  24.5 %


(37.0-47.0) 26.0 %


(37.0-47.0)


 


Mean Corpuscular Volume 90 FL (80-99)   90 FL (80-99)  90 FL (80-99) 


 


Mean Corpuscular Hemoglobin


  29.7 PG


(27.0-31.0) 


  29.5 PG


(27.0-31.0) 30.2 PG


(27.0-31.0)


 


Mean Corpuscular Hemoglobin


Concent 33.0 G/DL


(32.0-36.0) 


  32.8 G/DL


(32.0-36.0) 33.4 G/DL


(32.0-36.0)


 


Red Cell Distribution Width


  12.9 %


(11.6-14.8) 


  13.2 %


(11.6-14.8) 13.4 %


(11.6-14.8)


 


Platelet Count


  167 K/UL


(150-450) 


  172 K/UL


(150-450) 182 K/UL


(150-450)


 


Mean Platelet Volume


  6.9 FL


(6.5-10.1) 


  6.6 FL


(6.5-10.1) 6.7 FL


(6.5-10.1)


 


Neutrophils (%) (Auto)


   % (45.0-75.0) 


  


  82.1 %


(45.0-75.0) 77.9 %


(45.0-75.0)


 


Lymphocytes (%) (Auto)


   % (20.0-45.0) 


  


  10.0 %


(20.0-45.0) 15.6 %


(20.0-45.0)


 


Monocytes (%) (Auto)


   % (1.0-10.0) 


  


  4.5 %


(1.0-10.0) 4.7 %


(1.0-10.0)


 


Eosinophils (%) (Auto)


   % (0.0-3.0) 


  


  2.6 %


(0.0-3.0) 1.0 %


(0.0-3.0)


 


Basophils (%) (Auto)


   % (0.0-2.0) 


  


  0.8 %


(0.0-2.0) 0.8 %


(0.0-2.0)


 


Differential Total Cells


Counted 100 


  


  


  


 


 


Neutrophils % (Manual) 87 % (45-75)    


 


Lymphocytes % (Manual) 4 % (20-45)    


 


Monocytes % (Manual) 7 % (1-10)    


 


Eosinophils % (Manual) 1 % (0-3)    


 


Basophils % (Manual) 1 % (0-2)    


 


Band Neutrophils 0 % (0-8)    


 


Platelet Estimate Adequate    


 


Platelet Morphology Normal    


 


Hypochromasia 1+    


 


Sodium Level


  137 MMOL/L


(136-145) 


  137 MMOL/L


(136-145) 142 MMOL/L


(136-145)


 


Potassium Level


  3.4 MMOL/L


(3.5-5.1) 


  3.5 MMOL/L


(3.5-5.1) 4.2 MMOL/L


(3.5-5.1)


 


Chloride Level


  102 MMOL/L


() 


  106 MMOL/L


() 108 MMOL/L


()


 


Carbon Dioxide Level


  26 MMOL/L


(21-32) 


  25 MMOL/L


(21-32) 24 MMOL/L


(21-32)


 


Anion Gap


  9 mmol/L


(5-15) 


  6 mmol/L


(5-15) 11 mmol/L


(5-15)


 


Blood Urea Nitrogen


  62 mg/dL


(7-18) 


  48 mg/dL


(7-18) 43 mg/dL


(7-18)


 


Creatinine


  1.2 MG/DL


(0.55-1.30) 


  1.0 MG/DL


(0.55-1.30) 1.1 MG/DL


(0.55-1.30)


 


Estimat Glomerular Filtration


Rate 42.5 mL/min


(>60) 


  52.5 mL/min


(>60) 47.0 mL/min


(>60)


 


Glucose Level


  117 MG/DL


() 


  83 MG/DL


() 72 MG/DL


()


 


Calcium Level


  7.4 MG/DL


(8.5-10.1) 


  7.6 MG/DL


(8.5-10.1) 8.1 MG/DL


(8.5-10.1)


 


Phosphorus Level


  2.7 MG/DL


(2.5-4.9) 


  


  


 


 


Magnesium Level


  2.1 MG/DL


(1.8-2.4) 


  


  


 


 


Total Bilirubin


  1.0 MG/DL


(0.2-1.0) 


  


  0.8 MG/DL


(0.2-1.0)


 


Aspartate Amino Transf


(AST/SGOT) 46 U/L (15-37) 


  


  


  38 U/L (15-37) 


 


 


Alanine Aminotransferase


(ALT/SGPT) 47 U/L (12-78) 


  


  


  40 U/L (12-78) 


 


 


Alkaline Phosphatase


  164 U/L


() 


  


  113 U/L


()


 


Total Protein


  5.2 G/DL


(6.4-8.2) 


  


  5.1 G/DL


(6.4-8.2)


 


Albumin


  2.3 G/DL


(3.4-5.0) 


  


  2.0 G/DL


(3.4-5.0)


 


Globulin 2.9 g/dL    3.1 g/dL 


 


Albumin/Globulin Ratio 0.8 (1.0-2.7)    0.6 (1.0-2.7) 


 


Stool Occult Blood


  


  Positive


(NEGATIVE) 


  


 


 


Pro-B-Type Natriuretic Peptide


  


  


  


  83954 pg/mL


(0-125)








Height (Feet):  5


Height (Inches):  1.00


Weight (Pounds):  161


Objective


PE


General: Obtunded, no spontaneous movement.  No obvious distress


HEENT: NC/AT. 


Neck: Tracheostomy appears appropriately placed


Cardiovascular: RRR.  S1 and S2 normal.  No murmur appreciated


Resp: Normal work of breathing. No cough


Abdomen: Abdomen is soft, nondistended.  Gastrostomy tube appears in 

appropriate position without surrounding signs of infection or trauma.


Skin: Intact.  No abrasions, laceration or rash over the exposed skin


MSK: Normal tone and bulk.


Neuro: Obtunded











Felix Barriga MD Aug 21, 2020 08:42

## 2020-08-21 NOTE — DIAGNOSTIC IMAGING REPORT
Clinical Indication: Abdominal pain, bacteremia, fungemia

 

Technique:   Patient given enteric contrast via gastrostomy.  IV administration

nonionic contrast. Venous phase spiral acquisition obtained through the abdomen and

pelvis. Multiplanar reconstructions were generated. Total dose length product 474

mGycm. CTDIvol(s) 9 mGy. Dose reduction achieved using automated exposure control

 

 

Comparison: none

 

Findings: There are colonic diverticula. There also may be a small diverticulum

coming off of the terminal ileum. The appendix is not definitely identified, but no

findings to suggest acute appendicitis are evident. No small bowel distention.

Ingested contrast is seen throughout the entirety of the GI tract. No small bowel

wall thickening. There is a small amount of ascites fluid present, seen within the

pelvis and over the dome of the liver. No free intraperitoneal gas. There is a

gastrostomy tube in place. The gastrostomy tents the superior wall of the gastric

body cephalad and the bumper is located immediately anterior to the left hepatic

lobe. However, the tube tip is clearly intraluminal as all of the provided contrast

is intraluminal and there is no evidence of contrast extravasation. There is

equivocal mild wall thickening of the distal gastric antrum, which is nondistended.

The distal esophagus and duodenum are unremarkable.

 

The gallbladder wall appears very edematous. Small gallstones are seen within the

gallbladder lumen. The liver, bile ducts, pancreas, spleen, adrenals are

unremarkable. The kidneys demonstrate numerous cysts. There also appears to be

heterogeneous renal parenchymal opacification bilaterally. No renal or ureteral

calculi, hydronephrosis, or hydroureter. The uterus demonstrates multiple

calcifications, presumably old degenerated fibroids. The bladder is empty, contains a

Santizo catheter.

 

The included lower thorax demonstrates that the heart is enlarged. There are large

bilateral pleural effusions there there is resultant compressive atelectasis of much

of both lower lobes. There is also some lower lobe consolidation bilaterally. Hazy

parenchymal opacities are also seen in the aerated portions of the lower lungs.

 

The bones demonstrate degenerative spondylosis changes. There is extensive diffuse

edema of the subcutaneous fat and a lesser extent the abdominal and retroperitoneal

fat.

 

Impression: Heterogeneous bilateral renal parenchymal opacification, suspicious for

nephritis

 

Cardiomegaly

 

Evidence of anasarca, with large bilateral pleural effusions, mild ascites, edema of

the subcutaneous fat. Hazy pulmonary parenchymal opacity likely also reflects

pulmonary edema

 

Bilateral pulmonary parenchymal compressive atelectasis and likely consolidation

 

Colonic diverticulosis. No evidence of diverticulitis

 

Gastrostomy

 

Cholelithiasis. Edematous gallbladder wall is probably a manifestation of anasarca,

but possibility of acute cholecystitis should also be considered. Correlate with

clinical findings.

 

Equivocal mild wall thickening of the distal gastric antrum, probably artifact of

under distention, gastritis also possible.

 

Other findings noted, including Santizo catheter, evidence of old degenerated fibroids,

numerous renal cysts

 

The CT scanner at [facility] is accredited by the [employer]

Radiology and the scans are performed using protocols designed to limit radiation

exposure to as low as reasonably achievable to attain images of sufficient resolution

adequate for diagnostic evaluation.

## 2020-08-21 NOTE — NEPHROLOGY PROGRESS NOTE
Assessment/Plan


Problem List:  


(1) XUAN (acute kidney injury)


(2) NSTEMI (non-ST elevated myocardial infarction)


(3) Anemia


(4) Chronic respiratory failure


(5) Hypotension


(6) Hyponatremia


(7) ICH (intracerebral hemorrhage)


(8) Sepsis


Assessment





Acute renal failure, mainly prerenal picture


Non-STEMI: Troponin 2.1


Chronic ventilatory dependent respiratory failure, history of intracranial bleed


Septic shock with lactic acidosis, possible UTI, possible pneumonia


Severe hyponatremia


Severe anemia, history of GI bleed


Hypertension


GERD


Plan


August 21: Lab reviewed.  Renal parameters within normal limit.  Continue to 

monitor electrolytes.  Continue per consultants.


August 20: Lab reviewed.  Serum creatinine normal.  Patient on Lasix.  Will 

stop IV fluid.  Will monitor renal parameters and electrolytes.


August 19: Labs reviewed.  Serum creatinine normalized.  Patient edematous.  

Will decrease IV fluid.  Will give Lasix IV 20 mg daily.  Potassium supplement.

  Continue to monitor renal parameters.  Discussed with ROSALBA Castanon.





Hydrate


Hold BP medications, blood pressure is low


IV Protonix, Carafate


Antibiotics per ID


Monitor renal parameters and electrolytes


Potassium supplement as needed


Transfusion as needed


Per consultants





Subjective


ROS Limited/Unobtainable:  Yes





Objective


Objective





Last 24 Hour Vital Signs








  Date Time  Temp Pulse Resp B/P (MAP) Pulse Ox O2 Delivery O2 Flow Rate FiO2


 


8/21/20 10:57  109 21     35


 


8/21/20 09:49  120  140/95    


 


8/21/20 08:00 97.5 106 17 140/96 (111) 99   





  106      





  160      





  106      


 


8/21/20 07:54  121      


 


8/21/20 06:40  124 22     35


 


8/21/20 05:17  121 19     35


 


8/21/20 04:00        35


 


8/21/20 04:00      Mechanical Ventilator  


 


8/21/20 04:00 98.2 113 17 156/97 (116) 100   


 


8/21/20 03:36  118      


 


8/21/20 03:20  113 19     35


 


8/21/20 01:20  117 20     35


 


8/21/20 00:04  128  145/99    


 


8/21/20 00:00  125      


 


8/21/20 00:00        35


 


8/21/20 00:00      Mechanical Ventilator  


 


8/21/20 00:00 97.7 117 22 145/99 (114) 100   


 


8/20/20 22:33  130 19     35


 


8/20/20 21:57 98.7       


 


8/20/20 21:10  125 19     35


 


8/20/20 20:15  112 19     35


 


8/20/20 20:00 98.9 151 27 157/95 (115) 100   


 


8/20/20 20:00      Mechanical Ventilator  


 


8/20/20 19:26  120      


 


8/20/20 17:07  120 20     35


 


8/20/20 16:00        35


 


8/20/20 16:00 98.2 121 27 133/104 (114) 100   


 


8/20/20 16:00      Mechanical Ventilator  


 


8/20/20 16:00  106      


 


8/20/20 15:20  114 20     35


 


8/20/20 13:25  114 25     35

















Intake and Output  


 


 8/20/20 8/21/20





 19:00 07:00


 


Intake Total 215 ml 440 ml


 


Output Total 750 ml 600 ml


 


Balance -535 ml -160 ml


 


  


 


IV Total 215 ml 440 ml


 


Output Urine Total 750 ml 600 ml


 


# Bowel Movements 1 








Laboratory Tests


8/21/20 03:57: 


White Blood Count 7.0, Red Blood Count 2.88L, Hemoglobin 8.7L, Hematocrit 26.0L

, Mean Corpuscular Volume 90, Mean Corpuscular Hemoglobin 30.2, Mean 

Corpuscular Hemoglobin Concent 33.4, Red Cell Distribution Width 13.4, Platelet 

Count 182, Mean Platelet Volume 6.7, Neutrophils (%) (Auto) 77.9H, Lymphocytes (

%) (Auto) 15.6L, Monocytes (%) (Auto) 4.7, Eosinophils (%) (Auto) 1.0, 

Basophils (%) (Auto) 0.8, Sodium Level 142, Potassium Level 4.2, Chloride Level 

108H, Carbon Dioxide Level 24, Anion Gap 11, Blood Urea Nitrogen 43H, 

Creatinine 1.1, Estimat Glomerular Filtration Rate 47.0, Glucose Level 72L, 

Calcium Level 8.1L, Total Bilirubin 0.8, Aspartate Amino Transf (AST/SGOT) 38H, 

Alanine Aminotransferase (ALT/SGPT) 40, Alkaline Phosphatase 113, Pro-B-Type 

Natriuretic Peptide 05880J, Total Protein 5.1L, Albumin 2.0L, Globulin 3.1, 

Albumin/Globulin Ratio 0.6L


Height (Feet):  5


Height (Inches):  1.00


Weight (Pounds):  161


General Appearance:  no apparent distress


EENT:  other - Trach and vent


Cardiovascular:  tachycardia


Respiratory/Chest:  decreased breath sounds


Abdomen:  distended











Mando Garcia MD Aug 21, 2020 13:04

## 2020-08-21 NOTE — NUR
DISCHARGE PLANNING: NOTE 



DC REQUESTED PENDING EGD/COLO RESULTS 



CLINICALS FAXED TO [facility] AT [facility] AWAITING REVIEW

## 2020-08-21 NOTE — NUR
NURSE NOTES:

Gave report to ROSALBA Cosme at [facility]. Waiting for transport.

## 2020-08-21 NOTE — NUR
NURSE HAND-OFF REPORT: 



Important Events on Shift: BID Metoprolol ordered for Tacchycardia/HTN

Patient Status: Stable

Diet: NPO



Pending Orders: N

Pending Results/Labs:N

Pending MD notification:LINETTE mark to d/c PRBC order



Latest Vital Signs: Temperature 98.2 , Pulse 121 , B/P 156 /97 , Respiratory Rate 19 , O2 
 , Mechanical Ventilator, O2 Flow Rate .  

Vital Sign Comment: 



EKG Rhythm: Atrial Fibrillation

Rhythm change?: N 

MD Notified?: N -

MD Response: 



Latest Wilkinson Fall Score: 70  

Fall Risk: High Risk 

Safety Measures: Call light Within Reach, Bed Alarm Zone 1, Side Rails Side Rails x3, Bed 
position Low and Locked.

Fall Precautions: 

Yellow Socks

Yellow Gown

Patient Fall Education



Report given to ROSALBA Vincent.

## 2020-08-21 NOTE — NUR
NURSE NOTES:

Stat EKG ordered for HR of 156 bpm. Results show ST with RVR. Metoprolol BID ordered. 
Patient remains in stable condition. 

Clearance from cardio to transfuse PRBC despite high blood pressure of 157/95, per Dr. Redding. 

New orders from [facility] to transfuse only for hgb <7.5. Will notify Vinny in AM pending 
CBC lab results.

D/w lab, [facility], about missing STAT type and cross results. Per phlebotomist, "patient is too 
edematous and was unable to draw." 

Per [facility], lab is also unable to release PRB's for Hgb levels LESS THAN 8.0 during 
after-hours without approval from pathologist.

Will endorse to charge nurse and leave message for Vinny in AM.

## 2020-08-21 NOTE — PROCEDURE NOTE
DATE OF PROCEDURE:  08/21/2020

SURGEON:  George Mir MD.



PROCEDURE:  Colonoscopy.



ANESTHESIA:  Per CRNA, Renée Tarrillyasemin.



INSTRUMENT:  Olympus adult flexible colonoscope.



INDICATION:  GI bleeding.



REASON FOR PROCEDURE:  The procedure, risks, benefits, and possible

consequences, including hemorrhage, aspiration, perforation and infection,

and alternative treatments, were explained to the patient/legal guardian

by Dr. George Mir and the patient/legal guardian understood and

accepted these risks.



PROCEDURE IN DETAIL:  After informed consent was obtained and the patient

was adequately sedated, first rectal exam was performed, which was

positive for internal hemorrhoids.  Then, the scope was advanced from the

rectum into the cecum documented by appendix orifice, ileocecal valve, and

right upper quadrant palpation.  Quality of prep was good.



The patient had diverticulosis in the right colon, otherwise normal

colonoscopy examination.  Retroflexion of rectum showed evidence of

internal hemorrhoids.



SUMMARY OF FINDINGS:

1. Diverticulosis of right colon.

2. Internal hemorrhoids.



RECOMMENDATIONS:

1. Resume tube feeding.

2. Monitor H and H, transfuse as needed.









  ______________________________________________

  George Mir M.D. DR:  RODNEY

D:  08/21/2020 14:19

T:  08/21/2020 17:07

JOB#:  [id number]

CC:

## 2020-08-21 NOTE — PULMONOLGY CRITICAL CARE NOTE
Critical Care - Asmt/Plan


Problems:  


(1) ICH (intracerebral hemorrhage)


(2) Chronic vegetative state


(3) Feeding by G-tube


(4) Chronic respiratory failure


(5) Hyponatremia


(6) Bacteremia


(7) Fungemia


Respiratory:  monitor respiratory rate, adjust FIO2, CXR


Cardiac:  stop pressors, continue to monitor HR/BP, d/c cardiac monitor


Renal:  F/U I&O, keep IV fluid, check electrolytes


Infectious Disease:  check cultures


Gastrointestinal:  hold feedings


Endocrine:  check HgA1C


Neurologic:  PRN Ativan


Prophylaxis:  Protonix, Heparin


Time Spent (Minutes):  40


Notes Reviewed:  internist, cardio, renal


Discussed with:  nurses, consultants, 





Critical Care - Objective





Last 24 Hour Vital Signs








  Date Time  Temp Pulse Resp B/P (MAP) Pulse Ox O2 Delivery O2 Flow Rate FiO2


 


8/21/20 11:29  112      


 


8/21/20 10:57  109 21     35


 


8/21/20 09:49  120  140/95    


 


8/21/20 08:00 97.5 106 17 140/96 (111) 99   





  106      





  160      





  106      


 


8/21/20 07:54  121      


 


8/21/20 06:40  124 22     35


 


8/21/20 05:17  121 19     35


 


8/21/20 04:00        35


 


8/21/20 04:00      Mechanical Ventilator  


 


8/21/20 04:00 98.2 113 17 156/97 (116) 100   


 


8/21/20 03:36  118      


 


8/21/20 03:20  113 19     35


 


8/21/20 01:20  117 20     35


 


8/21/20 00:04  128  145/99    


 


8/21/20 00:00  125      


 


8/21/20 00:00        35


 


8/21/20 00:00      Mechanical Ventilator  


 


8/21/20 00:00 97.7 117 22 145/99 (114) 100   


 


8/20/20 22:33  130 19     35


 


8/20/20 21:57 98.7       


 


8/20/20 21:10  125 19     35


 


8/20/20 20:15  112 19     35


 


8/20/20 20:00 98.9 151 27 157/95 (115) 100   


 


8/20/20 20:00      Mechanical Ventilator  


 


8/20/20 19:26  120      


 


8/20/20 17:07  120 20     35


 


8/20/20 16:00        35


 


8/20/20 16:00 98.2 121 27 133/104 (114) 100   


 


8/20/20 16:00      Mechanical Ventilator  


 


8/20/20 16:00  106      


 


8/20/20 15:20  114 20     35


 


8/20/20 13:25  114 25     35








Status:  awake


Condition:  critical


HEENT:  atraumatic


Heart:  HR/BP unstable, regular


Abdomen:  non-tender


Extremities:  no C/C/E


Decubiti:  location


Micro:





Microbiology








 Date/Time


Source Procedure


Growth Status


 


 


 8/20/20 03:20


Blood Blood Culture - Preliminary


NO GROWTH AFTER 24 HOURS Resulted


 


 8/19/20 09:30


Blood Blood Culture - Preliminary


NO GROWTH AFTER 24 HOURS Resulted











Critical Care - Subjective


FI02:  35


Vent Support Breath Rate:  10


Vent Support Mode:  AC


Vent Tidal Volume:  400


Sputum Amount:  Small


PEEP:  5.0


PIP:  33


I&O:











Intake and Output  


 


 8/20/20 8/21/20





 19:00 07:00


 


Intake Total 215 ml 440 ml


 


Output Total 750 ml 600 ml


 


Balance -535 ml -160 ml


 


  


 


IV Total 215 ml 440 ml


 


Output Urine Total 750 ml 600 ml


 


# Bowel Movements 1 








Labs:





Laboratory Tests








Test


  8/21/20


03:57


 


White Blood Count


  7.0 K/UL


(4.8-10.8)


 


Red Blood Count


  2.88 M/UL


(4.20-5.40)  L


 


Hemoglobin


  8.7 G/DL


(12.0-16.0)  L


 


Hematocrit


  26.0 %


(37.0-47.0)  L


 


Mean Corpuscular Volume 90 FL (80-99)  


 


Mean Corpuscular Hemoglobin


  30.2 PG


(27.0-31.0)


 


Mean Corpuscular Hemoglobin


Concent 33.4 G/DL


(32.0-36.0)


 


Red Cell Distribution Width


  13.4 %


(11.6-14.8)


 


Platelet Count


  182 K/UL


(150-450)


 


Mean Platelet Volume


  6.7 FL


(6.5-10.1)


 


Neutrophils (%) (Auto)


  77.9 %


(45.0-75.0)  H


 


Lymphocytes (%) (Auto)


  15.6 %


(20.0-45.0)  L


 


Monocytes (%) (Auto)


  4.7 %


(1.0-10.0)


 


Eosinophils (%) (Auto)


  1.0 %


(0.0-3.0)


 


Basophils (%) (Auto)


  0.8 %


(0.0-2.0)


 


Sodium Level


  142 MMOL/L


(136-145)


 


Potassium Level


  4.2 MMOL/L


(3.5-5.1)


 


Chloride Level


  108 MMOL/L


()  H


 


Carbon Dioxide Level


  24 MMOL/L


(21-32)


 


Anion Gap


  11 mmol/L


(5-15)


 


Blood Urea Nitrogen


  43 mg/dL


(7-18)  H


 


Creatinine


  1.1 MG/DL


(0.55-1.30)


 


Estimat Glomerular Filtration


Rate 47.0 mL/min


(>60)


 


Glucose Level


  72 MG/DL


()  L


 


Calcium Level


  8.1 MG/DL


(8.5-10.1)  L


 


Total Bilirubin


  0.8 MG/DL


(0.2-1.0)


 


Aspartate Amino Transf


(AST/SGOT) 38 U/L (15-37)


H


 


Alanine Aminotransferase


(ALT/SGPT) 40 U/L (12-78)


 


 


Alkaline Phosphatase


  113 U/L


()


 


Pro-B-Type Natriuretic Peptide


  02873 pg/mL


(0-125)  H


 


Total Protein


  5.1 G/DL


(6.4-8.2)  L


 


Albumin


  2.0 G/DL


(3.4-5.0)  L


 


Globulin 3.1 g/dL  


 


Albumin/Globulin Ratio


  0.6 (1.0-2.7)


L

















Shiela Causey MD Aug 21, 2020 13:18

## 2020-08-22 NOTE — DISCHARGE SUMMARY
DATE OF ADMISSION:  08/16/2020



DATE OF DISCHARGE:  08/21/2020



This is one of third admission to [facility] of this

87-year-old lady because of drop in hemoglobin and hematocrit.



HISTORY OF PRESENT ILLNESS:  Patient is a resident of an extended care

facility subacute unit where she has been in stable condition for the last

several days.  She is known to have several chronic medical syndromes, but

has been stable on current medications.  _____ syndrome and status post

CVA, encephalopathy, respirator dependent with tracheostomy, and fed by

gastrostomy tube.  She has diabetes mellitus, hyperlipidemia, and

hypovitaminosis D.  On the day of admission, _____ assessment, she was

found to have _____ dropped from 11.2 to 6.9.  She was transferred to

[facility] ER and was admitted.



HOSPITAL COURSE:  Upon admission, patient underwent clinical, biological,

and imaging studies.  Clinical assessment revealed patient with borderline

blood pressure and tachycardia and hemoglobin of 6.0, hematocrit 18.3, WBC

was 9.5, and platelets 153.  Over the next several days, patient was

transfused 2 units of packed RBC.  She has been assessed by hematologist,

pulmonary specialist, infectious disease specialist, and

gastroenterologist.  Because of her drop in hemoglobin and hematocrit

_____ imaging and procedure studies.  Imaging study included chest x-ray,

venous duplex scan, abdomen and pelvic CT, and multiple chest x-rays.  She

does have an upper GI endoscopy and lower GI endoscopy done, which

revealed a source of bleeding.  Her urinalysis showed only small amount of

RBCs in the urine, 15 to 20 per high-powered field.  Her PT and PTT were

normal.  The patient was on new oral _____ which was discontinued.  At the

end of the workup, no cause of bleeding was identified, and her drop in

hemoglobin and hematocrit was considered to be secondary to _____.  At the

time of her discharge, she is hemodynamically stable.  Her blood pressure

is elevated at _____ Cardizem 90 mg prior to discharge.  She will be

placed on Procrit 10,000 units on Monday, Wednesday, and Friday and iron

supplement and she will be seen in an extended care facility as of 24

hours after discharge.









  ______________________________________________

  Lilly Casillas M.D.





DR:  BRANDEN

D:  08/21/2020 16:47

T:  08/22/2020 06:35

JOB#:  [id number]

CC:

## 2020-08-22 NOTE — PROCEDURE NOTE
DATE OF PROCEDURE:  08/21/2020

ENDOSCOPIST:  George Mir MD



PROCEDURE PERFORMED:  Colonoscopy.



ANESTHESIA:  Per CRNA, Renée Tarrillion.



INSTRUMENT USED:  Olympus adult flexible colonoscope.



INDICATIONS FOR PROCEDURE:  GI bleeding.



The procedure, risks, benefits, and possible consequences, including

hemorrhage, aspiration, perforation and infection, and alternative

treatments, were explained to the patient/legal guardian by Dr. George Mir and the patient/legal guardian understood and accepted these

risks.



DESCRIPTION OF PROCEDURE:  After informed consent was obtained and the

patient was adequately sedated, rectal exam was performed, which showed

some internal hemorrhoids.  Then, the scope was advanced into the rectum

to the cecum documented by appendix orifice, ileocecal valve, and right

upper quadrant palpation.  Quality of prep was good.



The patient had no active GI bleeding at this time.  _____ no polyp was

seen.  Retroflexion in the rectum showed evidence of internal

hemorrhoids.



SUMMARY OF FINDINGS:

1. _____

2. Internal hemorrhoids.



RECOMMENDATIONS:  _____.  At this time, there is no evidence of _____.









  ______________________________________________

  George Mir M.D.





DR:  STEVE

D:  08/21/2020 14:18

T:  08/22/2020 00:54

JOB#:  [id number]

CC: